# Patient Record
Sex: FEMALE | Race: WHITE | Employment: OTHER | URBAN - METROPOLITAN AREA
[De-identification: names, ages, dates, MRNs, and addresses within clinical notes are randomized per-mention and may not be internally consistent; named-entity substitution may affect disease eponyms.]

---

## 2017-01-06 ENCOUNTER — HOSPITAL ENCOUNTER (OUTPATIENT)
Dept: CT IMAGING | Age: 82
Discharge: HOME | End: 2017-01-06
Attending: INTERNAL MEDICINE
Payer: MEDICARE

## 2017-01-06 DIAGNOSIS — R22.1 NECK MASS: ICD-10-CM

## 2017-01-06 LAB — CREAT BLD-MCNC: 0.6 MG/DL (ref 0.6–1.3)

## 2017-01-06 PROCEDURE — 74011250636 HC RX REV CODE- 250/636: Performed by: INTERNAL MEDICINE

## 2017-01-06 PROCEDURE — 70491 CT SOFT TISSUE NECK W/DYE: CPT

## 2017-01-06 PROCEDURE — 74011636320 HC RX REV CODE- 636/320: Performed by: INTERNAL MEDICINE

## 2017-01-06 PROCEDURE — 82565 ASSAY OF CREATININE: CPT

## 2017-01-06 RX ORDER — SODIUM CHLORIDE 0.9 % (FLUSH) 0.9 %
10 SYRINGE (ML) INJECTION
Status: COMPLETED | OUTPATIENT
Start: 2017-01-06 | End: 2017-01-06

## 2017-01-06 RX ORDER — SODIUM CHLORIDE 9 MG/ML
50 INJECTION, SOLUTION INTRAVENOUS
Status: COMPLETED | OUTPATIENT
Start: 2017-01-06 | End: 2017-01-06

## 2017-01-06 RX ADMIN — SODIUM CHLORIDE 50 ML/HR: 900 INJECTION, SOLUTION INTRAVENOUS at 12:59

## 2017-01-06 RX ADMIN — Medication 10 ML: at 12:59

## 2017-01-06 RX ADMIN — IOPAMIDOL 100 ML: 612 INJECTION, SOLUTION INTRAVENOUS at 12:59

## 2017-01-13 ENCOUNTER — HOSPITAL ENCOUNTER (OUTPATIENT)
Dept: CT IMAGING | Age: 82
Discharge: HOME | End: 2017-01-13
Attending: INTERNAL MEDICINE
Payer: MEDICARE

## 2017-01-13 DIAGNOSIS — J18.9 PNEUMONIA: ICD-10-CM

## 2017-01-13 PROCEDURE — 74011636320 HC RX REV CODE- 636/320: Performed by: INTERNAL MEDICINE

## 2017-01-13 PROCEDURE — 74011250636 HC RX REV CODE- 250/636: Performed by: INTERNAL MEDICINE

## 2017-01-13 PROCEDURE — 71260 CT THORAX DX C+: CPT

## 2017-01-13 RX ORDER — SODIUM CHLORIDE 9 MG/ML
50 INJECTION, SOLUTION INTRAVENOUS
Status: COMPLETED | OUTPATIENT
Start: 2017-01-13 | End: 2017-01-13

## 2017-01-13 RX ORDER — SODIUM CHLORIDE 0.9 % (FLUSH) 0.9 %
10 SYRINGE (ML) INJECTION
Status: COMPLETED | OUTPATIENT
Start: 2017-01-13 | End: 2017-01-13

## 2017-01-13 RX ADMIN — Medication 10 ML: at 07:07

## 2017-01-13 RX ADMIN — IOPAMIDOL 100 ML: 612 INJECTION, SOLUTION INTRAVENOUS at 07:07

## 2017-01-13 RX ADMIN — SODIUM CHLORIDE 50 ML/HR: 900 INJECTION, SOLUTION INTRAVENOUS at 07:07

## 2017-01-20 ENCOUNTER — OFFICE VISIT (OUTPATIENT)
Dept: SURGERY | Age: 82
End: 2017-01-20

## 2017-01-20 VITALS
HEART RATE: 69 BPM | WEIGHT: 139 LBS | OXYGEN SATURATION: 96 % | RESPIRATION RATE: 16 BRPM | SYSTOLIC BLOOD PRESSURE: 131 MMHG | DIASTOLIC BLOOD PRESSURE: 76 MMHG | BODY MASS INDEX: 23.16 KG/M2 | HEIGHT: 65 IN

## 2017-01-20 DIAGNOSIS — E04.9 GOITER, NON-TOXIC: Primary | ICD-10-CM

## 2017-01-20 NOTE — PATIENT INSTRUCTIONS
Surgery Instruction Sheet    You have been scheduled for surgery on 2/2/17 at 2:00 pm at [address] [place] [address]. Please report to the Surgery Center at 12:00 pm, this is approximately 2 hours prior to your surgery time. The Surgery Center is located on the [address] side of the hospital, just next to the Emergency Room. Reserved parking is available and  parking if lot is full. You will need to have a Pre-op Visit prior to your surgery. Report to the Surgery Center on 1/25/17 at 8:00 am.  Bring a list of medications and your insurance cards with you. You may eat/drink prior to this visit. Call your physician immediately if you notice a change in your health between the time you saw your physician and the day of surgery. If you take a blood thinner, please let us know. Call your ordering Doctor to make sure you can stop taking it prior to your surgery. STOP YOUR ASPIRIN 10 DAYS PRIOR TO SURGERY. DO NOT TAKE  IBUPROFEN, ADVIL, MOTRIN, ALEVE, EXCEDRIN, BC POWDER, GOODIES, FISH OIL OR ANY MEDICATION CONTAINING ASPIRIN 10 DAYS PRIOR TO YOUR SURGERY. MAY TAKE TYLENOL. Eat a light dinner the evening before your surgery. DO NOT EAT OR DRINK ANYTHING AFTER MIDNIGHT THE NIGHT BEFORE YOUR SURGERY. This includes water, chewing gum, lifesavers, etc.  The Pre op nurse will check with you about any medication that you may need to take the morning of surgery. Shower with a new bar of anti-bacterial soap (Dial, Safeguard) or solution given to you by Pre-op, the night before surgery. Do not use lotion, powder, deodorant on the skin after showering. Wear loose, comfortable clothing the day of surgery and bring a container to store your contacts, eyeglasses, dentures, hearing aid, etc.  Do not bring money, valuables, jewelry, etc. to the hospital.      If you are having outpatient surgery, someone must come with you the morning of surgery to drive you home.   You can not drive for 24 hours after any anesthesia. Sometimes it is necessary to stay overnight and leave the next morning. This is still considered outpatient for most insurance deductibles. Someone will still need to drive you home. If you have questions or concerns, please feel free to call Dr Crystal Ivey. LPN at [phone]. If you need to cancel your surgery, please call as soon as possible.

## 2017-01-20 NOTE — PROGRESS NOTES
HISTORY OF PRESENT ILLNESS  Tracey Kathleen is a 80 y.o. female who comes in for consultation by Rogene Boast, MD for a symptomatic goiter  HPI  She recently had a URI and was felt to have enlargement of her left thyroid gland. She has a known goiter and had previously had the right lobe removed. She had a neck CT suggesting a 7.2 x 4.2 cm left lobe which extended a little below the sternal notch. This had slightly increased in size since it was last checked. She is on synthroid for hypothyroidism. She reports some dysphagia and neck pressure when laying flat. She denies voice changes, palpitations, chest pain, SOB, DODGE. She goes to the gym regularly during the week. Past Medical History   Diagnosis Date    Cancer [facility])      colon    Hypertension     Other ill-defined conditions(799.89)      hypercholesterolemia    Thyroid disease      Past Surgical History   Procedure Laterality Date    Pr abdomen surgery proc unlisted       colon resection    Hx heent       partial thyroidectomy    Hx tonsillectomy      Pr colonoscopy flx dx w/collj spec when pfrmd  3/13/2013          Hx other surgical       skin cancer removed     Family History   Problem Relation Age of Onset    Cancer Mother      stomach    Dementia Mother     Hypertension Mother     Hypertension Father     Cancer Sister      Colon Cancer    Heart Disease Brother     Diabetes Brother     Cancer Brother      Colon Cancer    Heart Disease Son      Social History   Substance Use Topics    Smoking status: Never Smoker    Smokeless tobacco: None    Alcohol use No     Current Outpatient Prescriptions   Medication Sig    OTHER Take 2.5 mg by mouth. Take Besylate 2.5mg    CHOLECALCIFEROL, VITAMIN D3, (VITAMIN D3 PO) Take  by mouth.  levothyroxine (SYNTHROID) 50 mcg tablet Take 50 mcg by mouth Daily (before breakfast).  aspirin delayed-release 81 mg tablet Take 81 mg by mouth daily.     cyanocobalamin (VITAMIN B-12) 500 mcg tablet Take 1,000 mcg by mouth daily.  vitamin a-vitamin c-vit e-min (OCUVITE) tablet Take 1 Tab by mouth daily.  atorvastatin (LIPITOR) 10 mg tablet Take 10 mg by mouth every evening.  donepezil (ARICEPT) 10 mg tablet Take 10 mg by mouth nightly. No current facility-administered medications for this visit. No Known Allergies    Review of Systems   Constitutional: Negative for chills, diaphoresis, fever, malaise/fatigue and weight loss. HENT: Positive for congestion and hearing loss. Negative for ear pain and sore throat. Eyes: Negative for blurred vision and pain. Respiratory: Negative for cough, hemoptysis, sputum production, shortness of breath, wheezing and stridor. Cardiovascular: Negative for chest pain, palpitations, orthopnea, claudication, leg swelling and PND. Gastrointestinal: Negative for blood in stool, constipation, diarrhea, heartburn, melena, nausea and vomiting. Genitourinary: Negative for dysuria, flank pain, frequency, hematuria and urgency. Musculoskeletal: Negative for back pain, joint pain, myalgias and neck pain. Skin: Negative for itching and rash. Neurological: Negative for dizziness, tremors, focal weakness, seizures, weakness and headaches. Endo/Heme/Allergies: Negative for polydipsia. Psychiatric/Behavioral: Negative for depression and memory loss. The patient is not nervous/anxious. Visit Vitals    /76 (BP 1 Location: Right arm, BP Patient Position: Sitting)    Pulse 69    Resp 16    Ht 5' 5\" (1.651 m)    Wt 63 kg (139 lb)    SpO2 96%    BMI 23.13 kg/m2       Physical Exam   Constitutional: She is oriented to person, place, and time. She appears well-developed and well-nourished. No distress. HENT:   Head: Normocephalic and atraumatic. Mouth/Throat: Oropharynx is clear and moist. No oropharyngeal exudate. Eyes: Conjunctivae and EOM are normal. Pupils are equal, round, and reactive to light. No scleral icterus.    Neck: Normal range of motion, full passive range of motion without pain and phonation normal. Neck supple. No JVD present. No tracheal deviation present. Thyroid mass (7-8 cm left lobe) present. No thyromegaly present. Cardiovascular: Normal rate and regular rhythm. Exam reveals no gallop and no friction rub. No murmur heard. Pulmonary/Chest: Effort normal and breath sounds normal. No stridor. No respiratory distress. She has no wheezes. She has no rales. Abdominal: Soft. Bowel sounds are normal. She exhibits no distension and no mass. There is no tenderness. There is no rebound and no guarding. Musculoskeletal: Normal range of motion. She exhibits no edema. Lymphadenopathy:     She has no cervical adenopathy. Neurological: She is alert and oriented to person, place, and time. No cranial nerve deficit. Skin: Skin is warm and dry. No rash noted. She is not diaphoretic. No erythema. No pallor. Psychiatric: She has a normal mood and affect. Her behavior is normal. Judgment and thought content normal.       ASSESSMENT and PLAN  1. Symptomatic goiter, left dominant nodule with hx prior right lobectomy I explained about the anatomy and pathophysiology of thyroid nodules/disease. I explained about possible malignancy. I discussed FNA, observation, possible thyroid suppression pending FNA, and total thyroidectomy. Risks of surgery include bleeding (potentially requiring emergent exploration at bedside), infection, parathyroid injury/removal requiring supplemental calcium +/- vit d, recurrent laryngeal/superior laryngeal nerve injury resulting in vocal cord paralysis, voice changes and fatigue, aspiration, further surgery, need for lifelong thyroid supplementation. We discussed FNA to assess for possible malignancy but she declined.   She desires a redo neck exploration and completion thyroidectomy under general anesthesia as an outpatient with an overnight stay  Her daughter was with her in the office today    Geovani Dye MD FACS

## 2017-01-20 NOTE — MR AVS SNAPSHOT
Visit Information Date & Time Provider Department Dept. Phone Encounter #  
 1/20/2017 11:20 AM Andres Tirado MD [facility] [address] [phone] [id number] Upcoming Health Maintenance Date Due DTaP/Tdap/Td series (1 - Tdap) 6/9/1950 ZOSTER VACCINE AGE 60> 6/9/1989 GLAUCOMA SCREENING Q2Y 6/9/1994 OSTEOPOROSIS SCREENING (DEXA) 6/9/1994 Pneumococcal 65+ Low/Medium Risk (1 of 2 - PCV13) 6/9/1994 MEDICARE YEARLY EXAM 6/9/1994 INFLUENZA AGE 9 TO ADULT 8/1/2016 Allergies as of 1/20/2017  Review Complete On: 1/20/2017 By: Andres Tirado MD  
 No Known Allergies Current Immunizations  Never Reviewed No immunizations on file. Not reviewed this visit Vitals BP Pulse Resp Height(growth percentile) Weight(growth percentile) SpO2  
 131/76 (BP 1 Location: Right arm, BP Patient Position: Sitting) 69 16 5' 5\" (1.651 m) 139 lb (63 kg) 96% BMI OB Status Smoking Status 23.13 kg/m2 Postmenopausal Never Smoker BMI and BSA Data Body Mass Index Body Surface Area  
 23.13 kg/m 2 1.7 m 2 Preferred Pharmacy Pharmacy Name Phone [facility] [address], [address] [place] [address] Avenue [phone] Your Updated Medication List  
  
   
This list is accurate as of: 1/20/17 12:17 PM.  Always use your most recent med list.  
  
  
  
  
 ARICEPT 10 mg tablet Generic drug:  donepezil Take 10 mg by mouth nightly. aspirin delayed-release 81 mg tablet Take 81 mg by mouth daily. LIPITOR 10 mg tablet Generic drug:  atorvastatin Take 10 mg by mouth every evening. OCUVITE tablet Generic drug:  vitamin a-vitamin c-vit e-min Take 1 Tab by mouth daily. OTHER Take 2.5 mg by mouth. Take Besylate 2.5mg  
  
 synthroid 50 mcg tablet Generic drug:  levothyroxine Take 50 mcg by mouth Daily (before breakfast). VITAMIN B-12 500 mcg tablet Generic drug:  cyanocobalamin Take 1,000 mcg by mouth daily. VITAMIN D3 PO Take  by mouth. To-Do List   
 01/26/2017 8:00 AM  
  Appointment with [facility] PAT ROOM P1 at [address] [place] [address] ([phone]) Patient Instructions Surgery Instruction Sheet You have been scheduled for surgery on 2/2/17 at 2:00 pm at [address] [facility]. Please report to the Surgery Center at 12:00 pm, this is approximately 2 hours prior to your surgery time. The Surgery Center is located on the [address] [place] [address] Street side of the hospital, just next to the Emergency Room. Reserved parking is available and  parking if lot is full. You will need to have a Pre-op Visit prior to your surgery. Report to the Surgery Center on 1/25/17 at 8:00 am.  Bring a list of medications and your insurance cards with you. You may eat/drink prior to this visit. Call your physician immediately if you notice a change in your health between the time you saw your physician and the day of surgery. If you take a blood thinner, please let us know. Call your ordering Doctor to make sure you can stop taking it prior to your surgery. STOP YOUR ASPIRIN 10 DAYS PRIOR TO SURGERY. DO NOT TAKE  IBUPROFEN, ADVIL, MOTRIN, ALEVE, EXCEDRIN, BC POWDER, GOODIES, FISH OIL OR ANY MEDICATION CONTAINING ASPIRIN 10 DAYS PRIOR TO YOUR SURGERY. MAY TAKE TYLENOL. Eat a light dinner the evening before your surgery. DO NOT EAT OR DRINK ANYTHING AFTER MIDNIGHT THE NIGHT BEFORE YOUR SURGERY. This includes water, chewing gum, lifesavers, etc.  The Pre op nurse will check with you about any medication that you may need to take the morning of surgery. Shower with a new bar of anti-bacterial soap (Dial, Safeguard) or solution given to you by Pre-op, the night before surgery. Do not use lotion, powder, deodorant on the skin after showering.   Wear loose, comfortable clothing the day of surgery and bring a container to store your contacts, eyeglasses, dentures, hearing aid, etc.  Do not bring money, valuables, jewelry, etc. to the hospital.   
 
If you are having outpatient surgery, someone must come with you the morning of surgery to drive you home. You can not drive for 24 hours after any anesthesia. Sometimes it is necessary to stay overnight and leave the next morning. This is still considered outpatient for most insurance deductibles. Someone will still need to drive you home. If you have questions or concerns, please feel free to call Dr Dunia Tovar. LPN at [phone]. If you need to cancel your surgery, please call as soon as possible. Introducing [facility]! [address] introduces [employer] patient portal. Now you can access parts of your medical record, email your doctor's office, and request medication refills online. 1. In your internet browser, go to [email or web address] 2. Click on the First Time User? Click Here link in the Sign In box. You will see the New Member Sign Up page. 3. Enter your [employer] Access Code exactly as it appears below. You will not need to use this code after youve completed the sign-up process. If you do not sign up before the expiration date, you must request a new code. · [employer] Access Code: [email or web address] Expires: 4/5/2017  4:53 PM 
 
4. Enter the last four digits of your Social Security Number (xxxx) and Date of Birth (mm/dd/yyyy) as indicated and click Submit. You will be taken to the next sign-up page. 5. Create a [employer]t ID. This will be your my[employer]Dreams login ID and cannot be changed, so think of one that is secure and easy to remember. 6. Create a [employer]reams password. You can change your password at any time. 7. Enter your Password Reset Question and Answer. This can be used at a later time if you forget your password. 8. Enter your e-mail address. You will receive e-mail notification when new information is available in [address]. 9. Click Sign Up. You can now view and download portions of your medical record. 10. Click the Download Summary menu link to download a portable copy of your medical information. If you have questions, please visit the Frequently Asked Questions section of the [employer] website. Remember, [employer] is NOT to be used for urgent needs. For medical emergencies, dial 911. Now available from your iPhone and Android! Please provide this summary of care documentation to your next provider. Your primary care clinician is listed as [address] [place] [address]. If you have any questions after today's visit, please call [phone].

## 2017-01-24 ENCOUNTER — TELEPHONE (OUTPATIENT)
Dept: SURGERY | Age: 82
End: 2017-01-24

## 2017-01-24 NOTE — TELEPHONE ENCOUNTER
Patient calling to ask if her surgery time on 2/2/17 could be moved up? Patient can be reached on home phone.

## 2017-01-24 NOTE — TELEPHONE ENCOUNTER
Spoke to pt, at this time there are no cancellations. Will call back if an earlier time is available.

## 2017-01-26 ENCOUNTER — HOSPITAL ENCOUNTER (OUTPATIENT)
Dept: GENERAL RADIOLOGY | Age: 82
Discharge: HOME | End: 2017-01-26
Attending: SURGERY
Payer: MEDICARE

## 2017-01-26 ENCOUNTER — HOSPITAL ENCOUNTER (OUTPATIENT)
Dept: PREADMISSION TESTING | Age: 82
Discharge: HOME | End: 2017-01-26
Attending: SURGERY
Payer: MEDICARE

## 2017-01-26 VITALS
BODY MASS INDEX: 23.18 KG/M2 | WEIGHT: 139.11 LBS | SYSTOLIC BLOOD PRESSURE: 141 MMHG | HEART RATE: 75 BPM | OXYGEN SATURATION: 98 % | RESPIRATION RATE: 16 BRPM | DIASTOLIC BLOOD PRESSURE: 66 MMHG | HEIGHT: 65 IN | TEMPERATURE: 97.7 F

## 2017-01-26 LAB
ANION GAP BLD CALC-SCNC: 9 MMOL/L (ref 5–15)
ATRIAL RATE: 74 BPM
BUN SERPL-MCNC: 14 MG/DL (ref 6–20)
BUN/CREAT SERPL: 24 (ref 12–20)
CALCIUM SERPL-MCNC: 8.6 MG/DL (ref 8.5–10.1)
CALCULATED P AXIS, ECG09: 60 DEGREES
CALCULATED R AXIS, ECG10: 48 DEGREES
CALCULATED T AXIS, ECG11: 56 DEGREES
CHLORIDE SERPL-SCNC: 108 MMOL/L (ref 97–108)
CO2 SERPL-SCNC: 26 MMOL/L (ref 21–32)
CREAT SERPL-MCNC: 0.58 MG/DL (ref 0.55–1.02)
DIAGNOSIS, 93000: NORMAL
ERYTHROCYTE [DISTWIDTH] IN BLOOD BY AUTOMATED COUNT: 13.6 % (ref 11.5–14.5)
GLUCOSE SERPL-MCNC: 101 MG/DL (ref 65–100)
HCT VFR BLD AUTO: 39.9 % (ref 35–47)
HGB BLD-MCNC: 13.1 G/DL (ref 11.5–16)
MCH RBC QN AUTO: 29.2 PG (ref 26–34)
MCHC RBC AUTO-ENTMCNC: 32.8 G/DL (ref 30–36.5)
MCV RBC AUTO: 88.9 FL (ref 80–99)
P-R INTERVAL, ECG05: 168 MS
PLATELET # BLD AUTO: 177 K/UL (ref 150–400)
POTASSIUM SERPL-SCNC: 4.1 MMOL/L (ref 3.5–5.1)
Q-T INTERVAL, ECG07: 386 MS
QRS DURATION, ECG06: 76 MS
QTC CALCULATION (BEZET), ECG08: 428 MS
RBC # BLD AUTO: 4.49 M/UL (ref 3.8–5.2)
SODIUM SERPL-SCNC: 143 MMOL/L (ref 136–145)
VENTRICULAR RATE, ECG03: 74 BPM
WBC # BLD AUTO: 5.3 K/UL (ref 3.6–11)

## 2017-01-26 PROCEDURE — 93005 ELECTROCARDIOGRAM TRACING: CPT

## 2017-01-26 PROCEDURE — 85027 COMPLETE CBC AUTOMATED: CPT | Performed by: SURGERY

## 2017-01-26 PROCEDURE — 80048 BASIC METABOLIC PNL TOTAL CA: CPT | Performed by: SURGERY

## 2017-01-26 PROCEDURE — 71020 XR CHEST PA LAT: CPT

## 2017-01-26 PROCEDURE — 36415 COLL VENOUS BLD VENIPUNCTURE: CPT | Performed by: SURGERY

## 2017-01-26 RX ORDER — CEFAZOLIN SODIUM IN 0.9 % NACL 2 G/100 ML
2 PLASTIC BAG, INJECTION (ML) INTRAVENOUS ONCE
Status: CANCELLED | OUTPATIENT
Start: 2017-02-02 | End: 2017-02-02

## 2017-01-26 NOTE — PERIOP NOTES
[facility]  Preoperative Instructions        Surgery Date 2/2/2017          Time of Arrival 12:00AM    1. On the day of your surgery, please report to the Surgical Services Registration Desk and sign in at your designated time. The Surgery Center is located to the right of the Emergency Room. 2. You must have someone with you to drive you home. You should not drive a car for 24 hours following surgery. Please make arrangements for a friend or family member to stay with you for the first 24 hours after your surgery. 3. Do not have anything to eat or drink (including water, gum, mints, coffee, juice) after midnight 2/1/2017              . This may not apply to medications prescribed by your physician. Please note special instructions, if applicable. If you are currently taking Plavix, Coumadin, or other blood-thinning agents, contact your surgeon for instructions. 4. We recommend you do not drink any alcoholic beverages for 24 hours before and after your surgery. 5. Have a list of all current medications, including vitamins, herbal supplements and any other over the counter medications. Stop all Aspirin and non-steroidal anti-inflammatory drugs (I.e. Advil, Aleve), as directed by your surgeon's office. Stop all vitamins and herbal supplements seven days prior to your surgery. 6. Wear comfortable clothes. Wear glasses instead of contacts. Do not bring any money or jewelry. Please bring picture ID, insurance card, and any prearranged co-payment or hospital payment. Do not wear make-up, particularly mascara the morning of your surgery. Do not wear nail polish, particularly if you are having foot /hand surgery. Wear your hair loose or down, no ponytails, buns, clare pins or clips. All body piercings must be removed.   Please shower with antibacterial soap for three consecutive days before and on the morning of surgery, but do not apply any lotions, powders or deodorants after the shower on the day of surgery. Please use a fresh towels after each shower. Please sleep in clean clothes and change bed linens the night before surgery. Please do not shave for 48 hours prior to surgery. Shaving of the face is acceptable. 7. You should understand that if you do not follow these instructions your surgery may be cancelled. If your physical condition changes (I.e. fever, cold or flu) please contact your surgeon as soon as possible. 8. It is important that you be on time. If a situation occurs where you may be late, please call [phone] (OR Holding Area). 9. If you have any questions and or problems, please call [phone] (Pre-admission Testing). 10. Your surgery time may be subject to change. You will receive a phone call the evening prior if your time changes. 11.  If having outpatient surgery, you must have someone to drive you here, stay with you during the duration of your stay, and to drive you home at time of discharge. Special Instructions:Stop Aspirin per surgeon's instructions. Stop Vitamins and Vitamin supplements 7 days prior to surgery    MEDICATIONS TO TAKE THE MORNING OF SURGERY WITH A SIP OF WATER:Synthroid, Amlodipine Besylate      I understand a pre-operative phone call will be made to verify my surgery time. In the event that I am not available, I give permission for a message to be left on my answering service and/or with another person?  Yes [phone]         ___________________      __________   _________    (Signature of Patient)             (Witness)                (Date and Time)

## 2017-01-30 NOTE — PERIOP NOTES
Called Dr Heather Sena office and spoke with Jacob Predomo to FU on previously Faxed pre-op labs and chest xray results, she stated \" Dr Carmela Galdamez evaluated and  said they were OK for surgery,  No Tx\".  Chriss DEGROOT

## 2017-02-01 ENCOUNTER — TELEPHONE (OUTPATIENT)
Dept: SURGERY | Age: 82
End: 2017-02-01

## 2017-02-01 NOTE — TELEPHONE ENCOUNTER
Spoke to pt, advised that she should resume normal diet after 24 hours and no lifting more than 10 lbs for one week. Patient verbalized understanding, no further questions.

## 2017-02-02 ENCOUNTER — ANESTHESIA (OUTPATIENT)
Dept: SURGERY | Age: 82
End: 2017-02-02
Payer: MEDICARE

## 2017-02-02 ENCOUNTER — ANESTHESIA EVENT (OUTPATIENT)
Dept: SURGERY | Age: 82
End: 2017-02-02
Payer: MEDICARE

## 2017-02-02 ENCOUNTER — SURGERY (OUTPATIENT)
Age: 82
End: 2017-02-02

## 2017-02-02 ENCOUNTER — HOSPITAL ENCOUNTER (OUTPATIENT)
Age: 82
Discharge: HOME | End: 2017-02-03
Attending: SURGERY | Admitting: SURGERY
Payer: MEDICARE

## 2017-02-02 LAB — CALCIUM SERPL-MCNC: 8.7 MG/DL (ref 8.5–10.1)

## 2017-02-02 PROCEDURE — 74011250636 HC RX REV CODE- 250/636

## 2017-02-02 PROCEDURE — 77030027138 HC INCENT SPIROMETER -A

## 2017-02-02 PROCEDURE — 77030029131 HC ADMN ST IV BLD N DEHP ICUM -B

## 2017-02-02 PROCEDURE — 77030010938 HC CLP LIG TELE -A: Performed by: SURGERY

## 2017-02-02 PROCEDURE — 76060000033 HC ANESTHESIA 1 TO 1.5 HR: Performed by: SURGERY

## 2017-02-02 PROCEDURE — 77030010375

## 2017-02-02 PROCEDURE — 77030018390 HC SPNG HEMSTAT2 J&J -B: Performed by: SURGERY

## 2017-02-02 PROCEDURE — 76010000149 HC OR TIME 1 TO 1.5 HR: Performed by: SURGERY

## 2017-02-02 PROCEDURE — 74011250636 HC RX REV CODE- 250/636: Performed by: SURGERY

## 2017-02-02 PROCEDURE — 88307 TISSUE EXAM BY PATHOLOGIST: CPT | Performed by: SURGERY

## 2017-02-02 PROCEDURE — 77030011640 HC PAD GRND REM COVD -A: Performed by: SURGERY

## 2017-02-02 PROCEDURE — 77030011267 HC ELECTRD BLD COVD -A: Performed by: SURGERY

## 2017-02-02 PROCEDURE — 77030018836 HC SOL IRR NACL ICUM -A: Performed by: SURGERY

## 2017-02-02 PROCEDURE — 82310 ASSAY OF CALCIUM: CPT | Performed by: SURGERY

## 2017-02-02 PROCEDURE — 74011000250 HC RX REV CODE- 250

## 2017-02-02 PROCEDURE — 77030002996 HC SUT SLK J&J -A: Performed by: SURGERY

## 2017-02-02 PROCEDURE — 77030032490 HC SLV COMPR SCD KNE COVD -B: Performed by: SURGERY

## 2017-02-02 PROCEDURE — 36415 COLL VENOUS BLD VENIPUNCTURE: CPT | Performed by: SURGERY

## 2017-02-02 PROCEDURE — 77030008684 HC TU ET CUF COVD -B: Performed by: NURSE ANESTHETIST, CERTIFIED REGISTERED

## 2017-02-02 PROCEDURE — 77030026438 HC STYL ET INTUB CARD -A: Performed by: NURSE ANESTHETIST, CERTIFIED REGISTERED

## 2017-02-02 PROCEDURE — 77030010516 HC APPL HEMA CLP TELE -B: Performed by: SURGERY

## 2017-02-02 PROCEDURE — 76210000016 HC OR PH I REC 1 TO 1.5 HR: Performed by: SURGERY

## 2017-02-02 PROCEDURE — 74011250636 HC RX REV CODE- 250/636: Performed by: ANESTHESIOLOGY

## 2017-02-02 PROCEDURE — 77030019908 HC STETH ESOPH SIMS -A: Performed by: NURSE ANESTHETIST, CERTIFIED REGISTERED

## 2017-02-02 PROCEDURE — 77030020782 HC GWN BAIR PAWS FLX 3M -B

## 2017-02-02 PROCEDURE — 77030031139 HC SUT VCRL2 J&J -A: Performed by: SURGERY

## 2017-02-02 PROCEDURE — 77030010507 HC ADH SKN DERMBND J&J -B: Performed by: SURGERY

## 2017-02-02 RX ORDER — FENTANYL CITRATE 50 UG/ML
50 INJECTION, SOLUTION INTRAMUSCULAR; INTRAVENOUS AS NEEDED
Status: DISCONTINUED | OUTPATIENT
Start: 2017-02-02 | End: 2017-02-02 | Stop reason: HOSPADM

## 2017-02-02 RX ORDER — LEVOTHYROXINE SODIUM 50 UG/1
50 TABLET ORAL
Status: DISCONTINUED | OUTPATIENT
Start: 2017-02-03 | End: 2017-02-03 | Stop reason: HOSPADM

## 2017-02-02 RX ORDER — ROCURONIUM BROMIDE 10 MG/ML
INJECTION, SOLUTION INTRAVENOUS AS NEEDED
Status: DISCONTINUED | OUTPATIENT
Start: 2017-02-02 | End: 2017-02-02 | Stop reason: HOSPADM

## 2017-02-02 RX ORDER — ACETAMINOPHEN 10 MG/ML
INJECTION, SOLUTION INTRAVENOUS AS NEEDED
Status: DISCONTINUED | OUTPATIENT
Start: 2017-02-02 | End: 2017-02-02 | Stop reason: HOSPADM

## 2017-02-02 RX ORDER — HYDROMORPHONE HYDROCHLORIDE 1 MG/ML
.2-.5 INJECTION, SOLUTION INTRAMUSCULAR; INTRAVENOUS; SUBCUTANEOUS
Status: DISCONTINUED | OUTPATIENT
Start: 2017-02-02 | End: 2017-02-02 | Stop reason: HOSPADM

## 2017-02-02 RX ORDER — SODIUM CHLORIDE, SODIUM LACTATE, POTASSIUM CHLORIDE, CALCIUM CHLORIDE 600; 310; 30; 20 MG/100ML; MG/100ML; MG/100ML; MG/100ML
25 INJECTION, SOLUTION INTRAVENOUS CONTINUOUS
Status: DISCONTINUED | OUTPATIENT
Start: 2017-02-02 | End: 2017-02-02 | Stop reason: HOSPADM

## 2017-02-02 RX ORDER — HYDROCODONE BITARTRATE AND ACETAMINOPHEN 5; 325 MG/1; MG/1
1-2 TABLET ORAL
Qty: 30 TAB | Refills: 0 | Status: SHIPPED | OUTPATIENT
Start: 2017-02-02 | End: 2017-03-13 | Stop reason: ALTCHOICE

## 2017-02-02 RX ORDER — ONDANSETRON 2 MG/ML
4 INJECTION INTRAMUSCULAR; INTRAVENOUS AS NEEDED
Status: DISCONTINUED | OUTPATIENT
Start: 2017-02-02 | End: 2017-02-02 | Stop reason: HOSPADM

## 2017-02-02 RX ORDER — CEFAZOLIN SODIUM IN 0.9 % NACL 2 G/100 ML
2 PLASTIC BAG, INJECTION (ML) INTRAVENOUS ONCE
Status: COMPLETED | OUTPATIENT
Start: 2017-02-02 | End: 2017-02-02

## 2017-02-02 RX ORDER — DIPHENHYDRAMINE HYDROCHLORIDE 50 MG/ML
12.5 INJECTION, SOLUTION INTRAMUSCULAR; INTRAVENOUS AS NEEDED
Status: DISCONTINUED | OUTPATIENT
Start: 2017-02-02 | End: 2017-02-02 | Stop reason: HOSPADM

## 2017-02-02 RX ORDER — FENTANYL CITRATE 50 UG/ML
25 INJECTION, SOLUTION INTRAMUSCULAR; INTRAVENOUS
Status: DISCONTINUED | OUTPATIENT
Start: 2017-02-02 | End: 2017-02-02 | Stop reason: HOSPADM

## 2017-02-02 RX ORDER — NEOSTIGMINE METHYLSULFATE 1 MG/ML
INJECTION INTRAVENOUS AS NEEDED
Status: DISCONTINUED | OUTPATIENT
Start: 2017-02-02 | End: 2017-02-02 | Stop reason: HOSPADM

## 2017-02-02 RX ORDER — LIDOCAINE HYDROCHLORIDE 20 MG/ML
INJECTION, SOLUTION EPIDURAL; INFILTRATION; INTRACAUDAL; PERINEURAL AS NEEDED
Status: DISCONTINUED | OUTPATIENT
Start: 2017-02-02 | End: 2017-02-02 | Stop reason: HOSPADM

## 2017-02-02 RX ORDER — FENTANYL CITRATE 50 UG/ML
INJECTION, SOLUTION INTRAMUSCULAR; INTRAVENOUS AS NEEDED
Status: DISCONTINUED | OUTPATIENT
Start: 2017-02-02 | End: 2017-02-02 | Stop reason: HOSPADM

## 2017-02-02 RX ORDER — SUCCINYLCHOLINE CHLORIDE 20 MG/ML
INJECTION INTRAMUSCULAR; INTRAVENOUS AS NEEDED
Status: DISCONTINUED | OUTPATIENT
Start: 2017-02-02 | End: 2017-02-02 | Stop reason: HOSPADM

## 2017-02-02 RX ORDER — HYDROCODONE BITARTRATE AND ACETAMINOPHEN 7.5; 325 MG/15ML; MG/15ML
10 SOLUTION ORAL
Status: DISCONTINUED | OUTPATIENT
Start: 2017-02-02 | End: 2017-02-03 | Stop reason: HOSPADM

## 2017-02-02 RX ORDER — ONDANSETRON 2 MG/ML
INJECTION INTRAMUSCULAR; INTRAVENOUS AS NEEDED
Status: DISCONTINUED | OUTPATIENT
Start: 2017-02-02 | End: 2017-02-02 | Stop reason: HOSPADM

## 2017-02-02 RX ORDER — SODIUM CHLORIDE 0.9 % (FLUSH) 0.9 %
5-10 SYRINGE (ML) INJECTION EVERY 8 HOURS
Status: DISCONTINUED | OUTPATIENT
Start: 2017-02-02 | End: 2017-02-03 | Stop reason: HOSPADM

## 2017-02-02 RX ORDER — MIDAZOLAM HYDROCHLORIDE 1 MG/ML
1 INJECTION, SOLUTION INTRAMUSCULAR; INTRAVENOUS AS NEEDED
Status: DISCONTINUED | OUTPATIENT
Start: 2017-02-02 | End: 2017-02-02 | Stop reason: HOSPADM

## 2017-02-02 RX ORDER — GLYCOPYRROLATE 0.2 MG/ML
INJECTION INTRAMUSCULAR; INTRAVENOUS AS NEEDED
Status: DISCONTINUED | OUTPATIENT
Start: 2017-02-02 | End: 2017-02-02 | Stop reason: HOSPADM

## 2017-02-02 RX ORDER — MORPHINE SULFATE 2 MG/ML
1-2 INJECTION, SOLUTION INTRAMUSCULAR; INTRAVENOUS
Status: DISCONTINUED | OUTPATIENT
Start: 2017-02-02 | End: 2017-02-03 | Stop reason: HOSPADM

## 2017-02-02 RX ORDER — SODIUM CHLORIDE 0.9 % (FLUSH) 0.9 %
5-10 SYRINGE (ML) INJECTION AS NEEDED
Status: DISCONTINUED | OUTPATIENT
Start: 2017-02-02 | End: 2017-02-03 | Stop reason: HOSPADM

## 2017-02-02 RX ORDER — ONDANSETRON 4 MG/1
4 TABLET, ORALLY DISINTEGRATING ORAL
Status: DISCONTINUED | OUTPATIENT
Start: 2017-02-02 | End: 2017-02-03 | Stop reason: HOSPADM

## 2017-02-02 RX ORDER — DIPHENHYDRAMINE HYDROCHLORIDE 50 MG/ML
12.5 INJECTION, SOLUTION INTRAMUSCULAR; INTRAVENOUS
Status: DISCONTINUED | OUTPATIENT
Start: 2017-02-02 | End: 2017-02-03 | Stop reason: HOSPADM

## 2017-02-02 RX ORDER — NALOXONE HYDROCHLORIDE 0.4 MG/ML
0.2 INJECTION, SOLUTION INTRAMUSCULAR; INTRAVENOUS; SUBCUTANEOUS
Status: DISCONTINUED | OUTPATIENT
Start: 2017-02-02 | End: 2017-02-03 | Stop reason: HOSPADM

## 2017-02-02 RX ORDER — LIDOCAINE HYDROCHLORIDE 10 MG/ML
0.1 INJECTION, SOLUTION EPIDURAL; INFILTRATION; INTRACAUDAL; PERINEURAL AS NEEDED
Status: DISCONTINUED | OUTPATIENT
Start: 2017-02-02 | End: 2017-02-02 | Stop reason: HOSPADM

## 2017-02-02 RX ORDER — DEXTROSE, SODIUM CHLORIDE, AND POTASSIUM CHLORIDE 5; .45; .15 G/100ML; G/100ML; G/100ML
50 INJECTION INTRAVENOUS CONTINUOUS
Status: DISCONTINUED | OUTPATIENT
Start: 2017-02-02 | End: 2017-02-03 | Stop reason: HOSPADM

## 2017-02-02 RX ORDER — PROPOFOL 10 MG/ML
INJECTION, EMULSION INTRAVENOUS AS NEEDED
Status: DISCONTINUED | OUTPATIENT
Start: 2017-02-02 | End: 2017-02-02 | Stop reason: HOSPADM

## 2017-02-02 RX ORDER — HYDROMORPHONE HYDROCHLORIDE 2 MG/ML
INJECTION, SOLUTION INTRAMUSCULAR; INTRAVENOUS; SUBCUTANEOUS AS NEEDED
Status: DISCONTINUED | OUTPATIENT
Start: 2017-02-02 | End: 2017-02-02 | Stop reason: HOSPADM

## 2017-02-02 RX ORDER — DEXAMETHASONE SODIUM PHOSPHATE 4 MG/ML
INJECTION, SOLUTION INTRA-ARTICULAR; INTRALESIONAL; INTRAMUSCULAR; INTRAVENOUS; SOFT TISSUE AS NEEDED
Status: DISCONTINUED | OUTPATIENT
Start: 2017-02-02 | End: 2017-02-02 | Stop reason: HOSPADM

## 2017-02-02 RX ADMIN — ACETAMINOPHEN 1000 MG: 10 INJECTION, SOLUTION INTRAVENOUS at 14:00

## 2017-02-02 RX ADMIN — CEFAZOLIN 2 G: 10 INJECTION, POWDER, FOR SOLUTION INTRAVENOUS; PARENTERAL at 13:38

## 2017-02-02 RX ADMIN — NEOSTIGMINE METHYLSULFATE 3 MG: 1 INJECTION INTRAVENOUS at 14:25

## 2017-02-02 RX ADMIN — PROPOFOL 150 MG: 10 INJECTION, EMULSION INTRAVENOUS at 13:27

## 2017-02-02 RX ADMIN — LIDOCAINE HYDROCHLORIDE 80 MG: 20 INJECTION, SOLUTION EPIDURAL; INFILTRATION; INTRACAUDAL; PERINEURAL at 13:27

## 2017-02-02 RX ADMIN — HYDROMORPHONE HYDROCHLORIDE 0.5 MG: 2 INJECTION, SOLUTION INTRAMUSCULAR; INTRAVENOUS; SUBCUTANEOUS at 13:55

## 2017-02-02 RX ADMIN — SODIUM CHLORIDE, SODIUM LACTATE, POTASSIUM CHLORIDE, AND CALCIUM CHLORIDE 25 ML/HR: 600; 310; 30; 20 INJECTION, SOLUTION INTRAVENOUS at 11:28

## 2017-02-02 RX ADMIN — ONDANSETRON 4 MG: 2 INJECTION INTRAMUSCULAR; INTRAVENOUS at 14:15

## 2017-02-02 RX ADMIN — FENTANYL CITRATE 50 MCG: 50 INJECTION, SOLUTION INTRAMUSCULAR; INTRAVENOUS at 13:45

## 2017-02-02 RX ADMIN — ROCURONIUM BROMIDE 30 MG: 10 INJECTION, SOLUTION INTRAVENOUS at 13:27

## 2017-02-02 RX ADMIN — GLYCOPYRROLATE 0.4 MG: 0.2 INJECTION INTRAMUSCULAR; INTRAVENOUS at 14:25

## 2017-02-02 RX ADMIN — SUCCINYLCHOLINE CHLORIDE 160 MG: 20 INJECTION INTRAMUSCULAR; INTRAVENOUS at 13:27

## 2017-02-02 RX ADMIN — DEXAMETHASONE SODIUM PHOSPHATE 5 MG: 4 INJECTION, SOLUTION INTRA-ARTICULAR; INTRALESIONAL; INTRAMUSCULAR; INTRAVENOUS; SOFT TISSUE at 13:47

## 2017-02-02 RX ADMIN — FENTANYL CITRATE 50 MCG: 50 INJECTION, SOLUTION INTRAMUSCULAR; INTRAVENOUS at 13:27

## 2017-02-02 RX ADMIN — DEXTROSE MONOHYDRATE, SODIUM CHLORIDE, AND POTASSIUM CHLORIDE 50 ML/HR: 50; 4.5; 1.49 INJECTION, SOLUTION INTRAVENOUS at 15:04

## 2017-02-02 NOTE — ANESTHESIA POSTPROCEDURE EVALUATION
Post-Anesthesia Evaluation and Assessment    Patient: Will Yousif MRN: [id number]  SSN: [id number]    YOB: 1929  Age: 80 y.o. Sex: female       Cardiovascular Function/Vital Signs  Visit Vitals    /72    Pulse (!) 56    Temp 36.2 °C (97.2 °F)    Resp 13    Ht 5' 5\" (1.651 m)    Wt 61 kg (134 lb 7.7 oz)    SpO2 98%    BMI 22.38 kg/m2       Patient is status post general anesthesia for Procedure(s):  COMPLETION THYROIDECTOMY. Nausea/Vomiting: None    Postoperative hydration reviewed and adequate. Pain:  Pain Scale 1: Numeric (0 - 10) (02/02/17 1450)  Pain Intensity 1: 0 (02/02/17 1450)   Managed    Neurological Status:   Neuro (WDL): Exceptions to WDL (02/02/17 1440)  Neuro  Neurologic State: Drowsy (02/02/17 1440)  Orientation Level: Oriented X4 (02/02/17 1440)  Cognition: Follows commands (02/02/17 1440)  Speech: Clear (02/02/17 1440)  LUE Motor Response: Purposeful (02/02/17 1440)  LLE Motor Response: Purposeful (02/02/17 1440)  RUE Motor Response: Purposeful (02/02/17 1440)  RLE Motor Response: Purposeful (02/02/17 1440)   At baseline    Mental Status and Level of Consciousness: Arousable    Pulmonary Status:   O2 Device: Nasal cannula (02/02/17 1450)   Adequate oxygenation and airway patent    Complications related to anesthesia: None    Post-anesthesia assessment completed.  No concerns    Signed By: Kenneth Maciel MD     February 2, 2017

## 2017-02-02 NOTE — IP AVS SNAPSHOT
Current Discharge Medication List  
  
Take these medications at their scheduled times Dose & Instructions Dispensing Information Comments Morning Noon Evening Bedtime  
 aspirin delayed-release 81 mg tablet Your next dose is: Today, Tomorrow Other:  ____________ Dose:  81 mg Take 81 mg by mouth daily. Refills:  0 PRESERVISION AREDS 2 (OMEGA-3) PO Your next dose is: Today, Tomorrow Other:  ____________ Dose:  1 Tab Take 1 Tab by mouth daily. Refills:  0  
     
   
   
   
  
 synthroid 50 mcg tablet Generic drug:  levothyroxine Your next dose is: Today, Tomorrow Other:  ____________ Dose:  50 mcg Take 50 mcg by mouth Daily (before breakfast). Refills:  0  
     
   
   
   
  
 VITAMIN B-12 500 mcg tablet Generic drug:  cyanocobalamin Your next dose is: Today, Tomorrow Other:  ____________ Dose:  1000 mcg Take 1,000 mcg by mouth daily. Refills:  0 Take these medications as needed Dose & Instructions Dispensing Information Comments Morning Noon Evening Bedtime HYDROcodone-acetaminophen 5-325 mg per tablet Commonly known as:  Thelbert Lockbourne Your next dose is: Today, Tomorrow Other:  ____________ Dose:  1-2 Tab Take 1-2 Tabs by mouth every six (6) hours as needed for Pain. Max Daily Amount: 8 Tabs. Quantity:  30 Tab Refills:  0 Take these medications as directed Dose & Instructions Dispensing Information Comments Morning Noon Evening Bedtime OTHER Your next dose is: Today, Tomorrow Other:  ____________ Dose:  2.5 mg Take 2.5 mg by mouth. Take Besylate 2.5mg Refills:  0  
     
   
   
   
  
 VITAMIN D3 PO Your next dose is: Today, Tomorrow Other:  ____________ Take  by mouth. Refills:  0 Where to Get Your Medications Information about where to get these medications is not yet available ! Ask your nurse or doctor about these medications HYDROcodone-acetaminophen 5-325 mg per tablet

## 2017-02-02 NOTE — BRIEF OP NOTE
BRIEF OPERATIVE NOTE    Date of Procedure: 2/2/2017   Preoperative Diagnosis: LEFT RECURRECT GOITER  Postoperative Diagnosis: GOITER    Procedure(s):  COMPLETION THYROIDECTOMY  Surgeon(s) and Role:     * Marah Guevara MD - Primary            Surgical Staff:  Circ-1: Mily Martins RN  Scrub Tech-1: Sharad Reinoso  Surg Asst-1: Willie Weston  Event Time In   Incision Start 1343   Incision Close 1425     Anesthesia: General   Estimated Blood Loss: 50 ml  Specimens:   ID Type Source Tests Collected by Time Destination   1 : left thyroid stitch left upper pole Preservative Thyroid  Marah Guevara MD 2/2/2017 1403 Pathology      Findings: large goiter   Complications: none  Implants: * No implants in log *

## 2017-02-02 NOTE — ROUTINE PROCESS
Primary Nurse Alfie Young RN and Janie Bourne RN performed a dual skin assessment on this patient No impairment noted  Ike score is 23. Small sore on top of left ankle.

## 2017-02-02 NOTE — DISCHARGE INSTRUCTIONS
Discharge Instructions:  Thyroidectomy    Dr. Rodrigues Neighbor    Call for appointment for follow up in 1 week [phone]    Activity:    Walk regularly. You may resume driving in 24 hours unless still requiring narcotics for pain. Work:    You may return to work in 11 - 7 days to light activity. No lifting more than 10 pounds for one week. Diet:    You may resume normal diet after 24 hours. Anesthesia and narcotics may cause nausea and vomiting. If persistent please call the office. Call if you have numbness or tingling around lips or fingertips as this is a sign of low calcium. Wound Care: You have a special dressing called Dermabond. It is okay to shower and let the water run over the incision but do not scrub the area or soak in a tub. If you have a small amount of drainage you may place a dry bandage over the wound and change it daily. If you experience a lot of drainage, develop redness around the wound, or a fever over 101 F occurs please call the office. Medications:    Resume home medications as indicated on the Medical Reconciliation form. Aspirin, Coumadin, and Plavix can be restarted on post operative day 2 if you were taking them preoperatively. Pain medications:  Non steroidal antiinflammatories seem to work best for post surgical pain. Try these first as prescribed. A narcotic prescription will also be given for breakthrough pain. Over the counter stool softeners and laxatives may be used if needed. Do not hesitate to call with questions or concerns.

## 2017-02-02 NOTE — IP AVS SNAPSHOT
[address] [facility] [phone]. [phone] Patient: Nathaly Persaud MRN: [id number] :1929 You are allergic to the following No active allergies Recent Documentation Height Weight BMI OB Status Smoking Status 1.651 m 61 kg 22.38 kg/m2 Postmenopausal Never Smoker Emergency Contacts Name Discharge Info Relation Home Work Mobile Destini Macdonald CAREGIVER [3] Daughter [21]   [phone] Vipul Fang  Son [22]   [phone] About your hospitalization You were admitted on:  2017 You last received care in the:  [facility] 2 GENERAL SURGERY You were discharged on:  February 3, 2017 Unit phone number:  [phone] Why you were hospitalized Your primary diagnosis was:  Not on File Providers Seen During Your Hospitalizations Provider Role Specialty Primary office phone Tess Salazar MD Attending Provider General Surgery [phone] Your Primary Care Physician (PCP) Primary Care Physician Office Phone Office Fax Patdietera Mems [phone] Follow-up Information Follow up With Details Comments Contact Info MD Jaime Sanchez [address] [facility] [phone] Your Appointments Friday February 10, 2017 10:20 AM EST  
POST OP with Tess Salazar MD  
[facility] [address] Omari [address] [place] [address], [address] [facility]  
[phone] Current Discharge Medication List  
  
START taking these medications Dose & Instructions Dispensing Information Comments Morning Noon Evening Bedtime HYDROcodone-acetaminophen 5-325 mg per tablet Commonly known as:  Annamary Birgit Your next dose is: Today, Tomorrow Other:  _________ Dose:  1-2 Tab Take 1-2 Tabs by mouth every six (6) hours as needed for Pain. Max Daily Amount: 8 Tabs. Quantity:  30 Tab Refills:  0 CONTINUE these medications which have NOT CHANGED Dose & Instructions Dispensing Information Comments Morning Noon Evening Bedtime  
 aspirin delayed-release 81 mg tablet Your next dose is: Today, Tomorrow Other:  _________ Dose:  81 mg Take 81 mg by mouth daily. Refills:  0  
     
   
   
   
  
 OTHER Your next dose is: Today, Tomorrow Other:  _________ Dose:  2.5 mg Take 2.5 mg by mouth. Take Besylate 2.5mg Refills:  0 PRESERVISION AREDS 2 (OMEGA-3) PO Your next dose is: Today, Tomorrow Other:  _________ Dose:  1 Tab Take 1 Tab by mouth daily. Refills:  0  
     
   
   
   
  
 synthroid 50 mcg tablet Generic drug:  levothyroxine Your next dose is: Today, Tomorrow Other:  _________ Dose:  50 mcg Take 50 mcg by mouth Daily (before breakfast). Refills:  0  
     
   
   
   
  
 VITAMIN B-12 500 mcg tablet Generic drug:  cyanocobalamin Your next dose is: Today, Tomorrow Other:  _________ Dose:  1000 mcg Take 1,000 mcg by mouth daily. Refills:  0  
     
   
   
   
  
 VITAMIN D3 PO Your next dose is: Today, Tomorrow Other:  _________ Take  by mouth. Refills:  0 Where to Get Your Medications Information on where to get these meds will be given to you by the nurse or doctor. ! Ask your nurse or doctor about these medications HYDROcodone-acetaminophen 5-325 mg per tablet Discharge Instructions Discharge Instructions:  Thyroidectomy Dr. Clydie Sexton Call for appointment for follow up in 1 week [phone] Activity: Walk regularly. You may resume driving in 24 hours unless still requiring narcotics for pain. Work: 
 
You may return to work in 11 - 7 days to light activity. No lifting more than 10 pounds for one week. Diet: 
 
You may resume normal diet after 24 hours. Anesthesia and narcotics may cause nausea and vomiting. If persistent please call the office. Call if you have numbness or tingling around lips or fingertips as this is a sign of low calcium. Wound Care: You have a special dressing called Dermabond. It is okay to shower and let the water run over the incision but do not scrub the area or soak in a tub. If you have a small amount of drainage you may place a dry bandage over the wound and change it daily. If you experience a lot of drainage, develop redness around the wound, or a fever over 101 F occurs please call the office. Medications: 
 
Resume home medications as indicated on the Medical Reconciliation form. Aspirin, Coumadin, and Plavix can be restarted on post operative day 2 if you were taking them preoperatively. Pain medications:  Non steroidal antiinflammatories seem to work best for post surgical pain. Try these first as prescribed. A narcotic prescription will also be given for breakthrough pain. Over the counter stool softeners and laxatives may be used if needed. Do not hesitate to call with questions or concerns. Discharge Orders None Introducing [facility] & HEALTH SERVICES! Jessica Stevens introduces Emair patient portal. Now you can access parts of your medical record, email your doctor's office, and request medication refills online. 1. In your internet browser, go to [email or web address]://[email or web address] 2. Click on the First Time User? Click Here link in the Sign In box. You will see the New Member Sign Up page. 3. Enter your Emair Access Code exactly as it appears below.  You will not need to use this code after youve completed the sign-up process. If you do not sign up before the expiration date, you must request a new code. · [employer] Access Code: [email or web address] Expires: 4/5/2017  4:53 PM 
 
4. Enter the last four digits of your Social Security Number (xxxx) and Date of Birth (mm/dd/yyyy) as indicated and click Submit. You will be taken to the next sign-up page. 5. Create a Canburg ID. This will be your Canburg login ID and cannot be changed, so think of one that is secure and easy to remember. 6. Create a [employer]burg password. You can change your password at any time. 7. Enter your Password Reset Question and Answer. This can be used at a later time if you forget your password. 8. Enter your e-mail address. You will receive e-mail notification when new information is available in [address] 9. Click Sign Up. You can now view and download portions of your medical record. 10. Click the Download Summary menu link to download a portable copy of your medical information. If you have questions, please visit the Frequently Asked Questions section of the Canburg website. Remember, Canburg is NOT to be used for urgent needs. For medical emergencies, dial 911. Now available from your iPhone and Android! General Information Please provide this summary of care documentation to your next provider. Patient Signature:  ____________________________________________________________ Date:  ____________________________________________________________  
  
Krystal Go Provider Signature:  ____________________________________________________________ Date:  ____________________________________________________________

## 2017-02-02 NOTE — PERIOP NOTES
1440-Handoff Report from Operating Room to PACU    Report received from [address] CRNA regarding Shabnam Ordonez. Surgeon(s):  Yung Marie MD  And Procedure(s) (LRB):  COMPLETION THYROIDECTOMY (Left)  confirmed   with allergies and dressings discussed. Anesthesia type, drugs, patient history, complications, estimated blood loss, vital signs, intake and output, and last pain medication, lines, reversal medications and temperature were reviewed. 1545- Family updated. TRANSFER - OUT REPORT:    Verbal report given to Ronit DEGROOT(name) on [facility]  being transferred to gen surg(unit) for routine post - op       Report consisted of patients Situation, Background, Assessment and   Recommendations(SBAR). Information from the following report(s) SBAR, Kardex, OR Summary, Procedure Summary, Intake/Output, MAR and Recent Results was reviewed with the receiving nurse. Opportunity for questions and clarification was provided.       Patient transported with:   O2 @ 2 liters  Tech

## 2017-02-02 NOTE — OP NOTES
[address], [address]   OP NOTE       Name:  Ramirez Vitale   MR#:  [id number]   :  1929   Account #:  [de-identified]    Surgery Date:  2017   Date of Adm:  2017       PREOPERATIVE DIAGNOSIS: Large left-sided goiter with substernal   component and prior right thyroid lobectomy. POSTOPERATIVE DIAGNOSIS: Large left-sided goiter with substernal   component and prior right thyroid lobectomy. PROCEDURES PERFORMED: Redo neck exploration with completion   thyroidectomy. SURGEON: Suraj Hooper. Venus Alcantar MD.    ANESTHESIA: General.    ESTIMATED BLOOD LOSS: 50 mL. COMPLICATIONS: None. SPECIMENS REMOVED: Left lobe thyroid, stitch at the upper pole. COMPLICATIONS: None. BRIEF HISTORY: The patient is an 80-year-old lady with symptomatic   goiter. She had previously had a right lobe removed and now an   enlarging left lobe that is becoming more and more symptomatic. Options were discussed. She elected to undergo neck exploration with   thyroidectomy. She understood that given the prior surgery, she was   higher risk for nerve injury as well as the parathyroid injury and   resultant hypocalcemia. She wished to proceed. DESCRIPTION OF PROCEDURE: The patient was taken to the   operating room and placed on the operating table in the supine   position and underwent general anesthesia and the roll was placed   underneath the shoulders, neck was placed in mild hyperextension,   and the neck was prepped and draped in the usual sterile fashion. After appropriate time-out and antibiotics were given, a curvilinear   incision was made along one of Marsha's lines through the old scar that   was done for her prior right lobectomy so electrocautery was used to   go through the subcutaneous tissues and through the platysma and   subplatysmal flaps were developed to the thyroid cartilage and inferior   to the sternal notch.  Then we dissected and worked on the left lobe   and dissected it out, taking fine silk ties and Ligaclips on the   lymphovascular channels that the upper pole and also in the lower   pole. We gently teased parathyroidal tissue and also recurrent   laryngeal nerve was identified and spared and in the process of   dissection, maintained away from there. It was actually not too difficult   to get it from the substernal component, but ultimately we were able to   pull it all up and off the ligament of Reyes and there was   no component of the right-sided thyroid left and we did not bother   reexploring the area. Once that was completed, irrigation and   suctioning performed of ongoing bleeding and we were able to control with ligaclips. A   Stitch was placed at the upper pole on the left lobe. Interrupted 2-0 Vicryl was used   to approximate the platysma and interrupted 2-0 Vicryl was used to   approximate the strap muscles, interrupted 3-0 Vicryl was used to   reapproximate the platysma. Running 4-0 Vicryl used to close the skin   and a Dermabond dressing applied. Upon completion of the operation,   the needle, sponge, and instrument counts were correct x2. The   patient tolerated the procedure well and was extubated and brought to   the recovery room. MICHAEL J. Ellis Canavan, MD Verdon Fu / Yesica.Ivonne   D:  02/02/2017   14:55   T:  02/02/2017   15:32   Job #:  [id number]

## 2017-02-02 NOTE — ROUTINE PROCESS
End of Shift Nursing Note    Bedside shift change report given to Bella Mariscal RN (oncoming nurse) by Collette Munson RN (offgoing nurse). Report included the following information SBAR. [facility] Phone:   [phone]    Significant changes during shift:    Emergency trach tray at bedside. Non-emergent issues for physician to address:        Number times ambulated in hallway past shift: 0      Number of times OOB to chair past shift: 0    POD #:      Vital Signs:    Temp: 98.9 °F (37.2 °C)     Pulse (Heart Rate): 85     BP: 147/71     Resp Rate: 16     O2 Sat (%): 92 %    Lines & Drains:     Urinary Catheter? NO   Placement Date:    Medical Necessity:   Central Line? NO   Placement Date:    Medical Necessity:   PICC Line? NO   Placement Date:    Medical Necessity:     NG tube [] in [] removed [] not applicable   Drains [] in [] removed [] not applicable     Skin Integrity:      Wounds: yes   Dressings Present: yes    Wound Concerns: no      GI:    Current diet:  DIET CLEAR LIQUID    Nausea: NO  Vomiting: NO  Bowel Sounds: NO  Flatus: NO  Last Bowel Movement: today   Appearance:     Respiratory:  Supplemental O2: NO      Device:    via  Liters/min     Incentive Spirometer: YES  Volume:   Coughing and Deep Breathing: YES  Oral Care: YES  Understanding (patient/family education): YES   Getting out of bed: YES  Head of bed elevation: YES    Patient Safety:    Falls Score: 1  Mobility Score: 1  Bed Alarm On? NO  Sitter? NO      Opportunity for questions and clarification was given to oncoming nurse. Patient bed is in Lowest position, side rails are up x w, door & observation blinds open as needed, call bell within reach and patient not in distress.     Leonel Ward RN

## 2017-02-03 VITALS
WEIGHT: 134.48 LBS | BODY MASS INDEX: 22.41 KG/M2 | HEIGHT: 65 IN | HEART RATE: 103 BPM | OXYGEN SATURATION: 99 % | RESPIRATION RATE: 16 BRPM | SYSTOLIC BLOOD PRESSURE: 148 MMHG | DIASTOLIC BLOOD PRESSURE: 72 MMHG | TEMPERATURE: 98.3 F

## 2017-02-03 LAB
ANION GAP BLD CALC-SCNC: 13 MMOL/L (ref 5–15)
BUN SERPL-MCNC: 11 MG/DL (ref 6–20)
BUN/CREAT SERPL: 18 (ref 12–20)
CALCIUM SERPL-MCNC: 8.4 MG/DL (ref 8.5–10.1)
CHLORIDE SERPL-SCNC: 104 MMOL/L (ref 97–108)
CO2 SERPL-SCNC: 21 MMOL/L (ref 21–32)
CREAT SERPL-MCNC: 0.62 MG/DL (ref 0.55–1.02)
GLUCOSE SERPL-MCNC: 132 MG/DL (ref 65–100)
POTASSIUM SERPL-SCNC: 3.8 MMOL/L (ref 3.5–5.1)
SODIUM SERPL-SCNC: 138 MMOL/L (ref 136–145)

## 2017-02-03 PROCEDURE — 36415 COLL VENOUS BLD VENIPUNCTURE: CPT | Performed by: SURGERY

## 2017-02-03 PROCEDURE — 80048 BASIC METABOLIC PNL TOTAL CA: CPT | Performed by: SURGERY

## 2017-02-03 PROCEDURE — 74011250637 HC RX REV CODE- 250/637: Performed by: SURGERY

## 2017-02-03 RX ADMIN — LEVOTHYROXINE SODIUM 50 MCG: 50 TABLET ORAL at 08:16

## 2017-02-03 NOTE — PROGRESS NOTES
Bedside and Verbal shift change report given to Apple Arnold RN (oncoming nurse) by Soumya Morgan RN (offgoing nurse). Report included the following information SBAR, Kardex, Intake/Output, MAR and Recent Results. Patient has discharge orders this morning. Up in room. No complaints of pain. Patient accompanied to car by son and granddaughter. Vitals stable upon discharge.

## 2017-02-03 NOTE — PROGRESS NOTES
End of Shift Nursing Note    Bedside shift change report given to ZANE Sr by Leti Haile. Report included the following information Kardex, Intake/Output, MAR, Recent Results and Cardiac Rhythm NSR. Zone Phone: [phone]    Significant changes during shift:   N/a   Non-emergent issues for physician to address: N/A           POD #: 1     Vital Signs:    Temp: 98.3 °F (36.8 °C)     Pulse (Heart Rate): (!) 103     BP: 148/72     Resp Rate: 16     O2 Sat (%): 99 %    Lines & Drains:     Urinary cath. Yes    NG tube [] in [] removed [x] not applicable   Drains [x] in [] removed [] not applicable     Skin Integrity:         Dressings Present:YES  Wound Concerns: YES thyroidectomy      GI:    Current diet:  DIET DENTAL SOFT (SOFT SOLID)    Nausea: No  Vomiting:No  Bowel Sounds: YES    Last Bowel Movement: 0700   Appearance: brown and formed      Incentive Spirometer: YES  Volume: 1200            Opportunity for questions and clarification was given to oncoming nurse. Patient bed is in Supine position, side rails are up x 2, door & observation blinds open as needed, call bell within reach and patient not in distress.     Shala Arauz RN

## 2017-02-03 NOTE — PROGRESS NOTES
Admit Date: 2017    POD 1 Day Post-Op    Procedure:  Procedure(s):  COMPLETION THYROIDECTOMY      Assessment:   Active Problems:    * No active hospital problems. *    1. Feels well  2. Minimal swelling  3. No paresthesias      Plan/Recommendations/Medical Decision Makin. Increase diet  2.  discharge    Subjective:     Patient has complaints of soreness. Objective:     Blood pressure 135/64, pulse 95, temperature 98.7 °F (37.1 °C), resp. rate 16, height 5' 5\" (1.651 m), weight 61 kg (134 lb 7.7 oz), SpO2 92 %. Temp (24hrs), Av.2 °F (36.8 °C), Min:97.2 °F (36.2 °C), Max:98.9 °F (37.2 °C)      Physical Exam:  THROAT & NECK: normal, no erythema or exudates noted.   and neck supple and symmetrical.  incision CDI, mild swelling, LUNG: clear to auscultation bilaterally, HEART: regular rate and rhythm, S1, S2 normal, no murmur, click, rub or gallop    Labs:   Recent Results (from the past 48 hour(s))   CALCIUM    Collection Time: 17  6:12 PM   Result Value Ref Range    Calcium 8.7 8.5 - 04.8 MG/DL   METABOLIC PANEL, BASIC    Collection Time: 17  3:14 AM   Result Value Ref Range    Sodium 138 136 - 145 mmol/L    Potassium 3.8 3.5 - 5.1 mmol/L    Chloride 104 97 - 108 mmol/L    CO2 21 21 - 32 mmol/L    Anion gap 13 5 - 15 mmol/L    Glucose 132 (H) 65 - 100 mg/dL    BUN 11 6 - 20 MG/DL    Creatinine 0.62 0.55 - 1.02 MG/DL    BUN/Creatinine ratio 18 12 - 20      GFR est AA >60 >60 ml/min/1.73m2    GFR est non-AA >60 >60 ml/min/1.73m2    Calcium 8.4 (L) 8.5 - 10.1 MG/DL       Data Review images and reports reviewed

## 2017-02-10 ENCOUNTER — TELEPHONE (OUTPATIENT)
Dept: SURGERY | Age: 82
End: 2017-02-10

## 2017-02-10 ENCOUNTER — OFFICE VISIT (OUTPATIENT)
Dept: SURGERY | Age: 82
End: 2017-02-10

## 2017-02-10 VITALS
SYSTOLIC BLOOD PRESSURE: 137 MMHG | RESPIRATION RATE: 16 BRPM | HEART RATE: 71 BPM | OXYGEN SATURATION: 98 % | DIASTOLIC BLOOD PRESSURE: 70 MMHG | WEIGHT: 134 LBS | BODY MASS INDEX: 22.33 KG/M2 | HEIGHT: 65 IN

## 2017-02-10 DIAGNOSIS — E89.0 POST-OPERATIVE HYPOTHYROIDISM: Primary | ICD-10-CM

## 2017-02-10 NOTE — TELEPHONE ENCOUNTER
Patient has set up her labwork, does not know if she needs to fast. If no answer, you can leave a message.

## 2017-02-10 NOTE — MR AVS SNAPSHOT
Visit Information Date & Time Provider Department Dept. Phone Encounter #  
 2/10/2017 10:20 AM Laura Mello MD [facility] [id number] Upcoming Health Maintenance Date Due DTaP/Tdap/Td series (1 - Tdap) 6/9/1950 ZOSTER VACCINE AGE 60> 6/9/1989 GLAUCOMA SCREENING Q2Y 6/9/1994 OSTEOPOROSIS SCREENING (DEXA) 6/9/1994 Pneumococcal 65+ Low/Medium Risk (1 of 2 - PCV13) 6/9/1994 MEDICARE YEARLY EXAM 6/9/1994 Allergies as of 2/10/2017  Review Complete On: 2/10/2017 By: Laura Mello MD  
 No Known Allergies Current Immunizations  Never Reviewed Name Date Influenza Vaccine 10/1/2016 Not reviewed this visit You Were Diagnosed With   
  
 Codes Comments Post-operative hypothyroidism    -  Primary ICD-10-CM: E89.0 ICD-9-CM: 244.0 Vitals BP Pulse Resp Height(growth percentile) Weight(growth percentile) SpO2  
 137/70 (BP 1 Location: Right arm, BP Patient Position: Sitting) 71 16 5' 5\" (1.651 m) 134 lb (60.8 kg) 98% BMI OB Status Smoking Status 22.3 kg/m2 Postmenopausal Never Smoker BMI and BSA Data Body Mass Index Body Surface Area  
 22.3 kg/m 2 1.67 m 2 Preferred Pharmacy Pharmacy Name Phone [facility] [address] [place] [address] Dr Patel, [address] [phone] Your Updated Medication List  
  
   
This list is accurate as of: 2/10/17 11:12 AM.  Always use your most recent med list.  
  
  
  
  
 aspirin delayed-release 81 mg tablet Take 81 mg by mouth daily. HYDROcodone-acetaminophen 5-325 mg per tablet Commonly known as:  Dyan Brandin Take 1-2 Tabs by mouth every six (6) hours as needed for Pain. Max Daily Amount: 8 Tabs. OTHER Take 2.5 mg by mouth. Take Besylate 2.5mg PRESERVISION AREDS 2 (OMEGA-3) PO Take 1 Tab by mouth daily. synthroid 50 mcg tablet Generic drug:  levothyroxine Take 50 mcg by mouth Daily (before breakfast). VITAMIN B-12 500 mcg tablet Generic drug:  cyanocobalamin Take 1,000 mcg by mouth daily. VITAMIN D3 PO Take  by mouth. We Performed the Following METABOLIC PANEL, BASIC [10308 CPT(R)] T4, FREE [id number] CPT(R)] TSH 3RD GENERATION [78716 CPT(R)] Introducing [facility]! [facility] introduces Sevcon patient portal. Now you can access parts of your medical record, email your doctor's office, and request medication refills online. 1. In your internet browser, go to [email or web address] 2. Click on the First Time User? Click Here link in the Sign In box. You will see the New Member Sign Up page. 3. Enter your Sevcon Access Code exactly as it appears below. You will not need to use this code after youve completed the sign-up process. If you do not sign up before the expiration date, you must request a new code. · Sevcon Access Code: [email or web address] Expires: 4/5/2017  4:53 PM 
 
4. Enter the last four digits of your Social Security Number (xxxx) and Date of Birth (mm/dd/yyyy) as indicated and click Submit. You will be taken to the next sign-up page. 5. Create a Sevcon ID. This will be your Sevcon login ID and cannot be changed, so think of one that is secure and easy to remember. 6. Create a Sevcon password. You can change your password at any time. 7. Enter your Password Reset Question and Answer. This can be used at a later time if you forget your password. 8. Enter your e-mail address. You will receive e-mail notification when new information is available in [address] 9. Click Sign Up. You can now view and download portions of your medical record. 10. Click the Download Summary menu link to download a portable copy of your medical information.  
 
If you have questions, please visit the Frequently Asked Questions section of the Sendmail. Remember, [employer]lighten [employer] is NOT to be used for urgent needs. For medical emergencies, dial 911. Now available from your iPhone and Android! Please provide this summary of care documentation to your next provider. Your primary care clinician is listed as [address] [place] [address] Bruce. If you have any questions after today's visit, please call [phone].

## 2017-02-10 NOTE — PROGRESS NOTES
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

## 2017-03-13 ENCOUNTER — OFFICE VISIT (OUTPATIENT)
Dept: SURGERY | Age: 82
End: 2017-03-13

## 2017-03-13 ENCOUNTER — TELEPHONE (OUTPATIENT)
Dept: SURGERY | Age: 82
End: 2017-03-13

## 2017-03-13 VITALS
RESPIRATION RATE: 20 BRPM | SYSTOLIC BLOOD PRESSURE: 144 MMHG | WEIGHT: 136 LBS | BODY MASS INDEX: 22.66 KG/M2 | HEART RATE: 73 BPM | DIASTOLIC BLOOD PRESSURE: 66 MMHG | HEIGHT: 65 IN

## 2017-03-13 DIAGNOSIS — Z09 POSTOPERATIVE EXAMINATION: Primary | ICD-10-CM

## 2017-03-13 NOTE — TELEPHONE ENCOUNTER
Received call from pt, patient would like to know if she should continue taking Tums, please advise.

## 2017-03-13 NOTE — TELEPHONE ENCOUNTER
Spoke to pt, advised per Dr. Delores joseph to stop TUMS.        She can stop the [facility]     Filipe Ochoa MD FACS

## 2017-05-30 ENCOUNTER — APPOINTMENT (OUTPATIENT)
Dept: GENERAL RADIOLOGY | Age: 82
End: 2017-05-30
Attending: PHYSICIAN ASSISTANT
Payer: MEDICARE

## 2017-05-30 ENCOUNTER — APPOINTMENT (OUTPATIENT)
Dept: CT IMAGING | Age: 82
End: 2017-05-30
Attending: EMERGENCY MEDICINE
Payer: MEDICARE

## 2017-05-30 ENCOUNTER — HOSPITAL ENCOUNTER (EMERGENCY)
Age: 82
End: 2017-05-30
Attending: EMERGENCY MEDICINE
Payer: MEDICARE

## 2017-05-30 ENCOUNTER — HOSPITAL ENCOUNTER (EMERGENCY)
Age: 82
Discharge: HOME | End: 2017-05-30
Attending: EMERGENCY MEDICINE | Admitting: EMERGENCY MEDICINE
Payer: MEDICARE

## 2017-05-30 VITALS
BODY MASS INDEX: 22.47 KG/M2 | DIASTOLIC BLOOD PRESSURE: 74 MMHG | TEMPERATURE: 98.3 F | HEART RATE: 60 BPM | SYSTOLIC BLOOD PRESSURE: 144 MMHG | RESPIRATION RATE: 16 BRPM | WEIGHT: 135 LBS | OXYGEN SATURATION: 99 %

## 2017-05-30 DIAGNOSIS — R42 DIZZINESS: Primary | ICD-10-CM

## 2017-05-30 LAB
ALBUMIN SERPL BCP-MCNC: 3.9 G/DL (ref 3.5–5)
ALBUMIN/GLOB SERPL: 1.1 {RATIO} (ref 1.1–2.2)
ALP SERPL-CCNC: 85 U/L (ref 45–117)
ALT SERPL-CCNC: 25 U/L (ref 12–78)
ANION GAP BLD CALC-SCNC: 7 MMOL/L (ref 5–15)
APPEARANCE UR: CLEAR
AST SERPL W P-5'-P-CCNC: 15 U/L (ref 15–37)
BASOPHILS # BLD AUTO: 0 K/UL (ref 0–0.1)
BASOPHILS # BLD: 0 % (ref 0–1)
BILIRUB SERPL-MCNC: 0.4 MG/DL (ref 0.2–1)
BILIRUB UR QL: NEGATIVE
BUN SERPL-MCNC: 16 MG/DL (ref 6–20)
BUN/CREAT SERPL: 28 (ref 12–20)
CALCIUM SERPL-MCNC: 9.3 MG/DL (ref 8.5–10.1)
CHLORIDE SERPL-SCNC: 100 MMOL/L (ref 97–108)
CK SERPL-CCNC: 74 U/L (ref 26–192)
CO2 SERPL-SCNC: 27 MMOL/L (ref 21–32)
COLOR UR: NORMAL
CREAT SERPL-MCNC: 0.57 MG/DL (ref 0.55–1.02)
EOSINOPHIL # BLD: 0.1 K/UL (ref 0–0.4)
EOSINOPHIL NFR BLD: 2 % (ref 0–7)
ERYTHROCYTE [DISTWIDTH] IN BLOOD BY AUTOMATED COUNT: 13.5 % (ref 11.5–14.5)
GLOBULIN SER CALC-MCNC: 3.6 G/DL (ref 2–4)
GLUCOSE SERPL-MCNC: 93 MG/DL (ref 65–100)
GLUCOSE UR STRIP.AUTO-MCNC: NEGATIVE MG/DL
HCT VFR BLD AUTO: 42.5 % (ref 35–47)
HGB BLD-MCNC: 14.2 G/DL (ref 11.5–16)
HGB UR QL STRIP: NEGATIVE
KETONES UR QL STRIP.AUTO: NEGATIVE MG/DL
LEUKOCYTE ESTERASE UR QL STRIP.AUTO: NEGATIVE
LYMPHOCYTES # BLD AUTO: 30 % (ref 12–49)
LYMPHOCYTES # BLD: 1.9 K/UL (ref 0.8–3.5)
MCH RBC QN AUTO: 29.5 PG (ref 26–34)
MCHC RBC AUTO-ENTMCNC: 33.4 G/DL (ref 30–36.5)
MCV RBC AUTO: 88.2 FL (ref 80–99)
MONOCYTES # BLD: 0.6 K/UL (ref 0–1)
MONOCYTES NFR BLD AUTO: 10 % (ref 5–13)
NEUTS SEG # BLD: 3.7 K/UL (ref 1.8–8)
NEUTS SEG NFR BLD AUTO: 58 % (ref 32–75)
NITRITE UR QL STRIP.AUTO: NEGATIVE
PH UR STRIP: 6.5 [PH] (ref 5–8)
PLATELET # BLD AUTO: 238 K/UL (ref 150–400)
POTASSIUM SERPL-SCNC: 4.3 MMOL/L (ref 3.5–5.1)
PROT SERPL-MCNC: 7.5 G/DL (ref 6.4–8.2)
PROT UR STRIP-MCNC: NEGATIVE MG/DL
RBC # BLD AUTO: 4.82 M/UL (ref 3.8–5.2)
SODIUM SERPL-SCNC: 134 MMOL/L (ref 136–145)
SP GR UR REFRACTOMETRY: 1.01 (ref 1–1.03)
TROPONIN I SERPL-MCNC: <0.04 NG/ML
UROBILINOGEN UR QL STRIP.AUTO: 0.2 EU/DL (ref 0.2–1)
WBC # BLD AUTO: 6.4 K/UL (ref 3.6–11)

## 2017-05-30 PROCEDURE — 75810000275 HC EMERGENCY DEPT VISIT NO LEVEL OF CARE

## 2017-05-30 PROCEDURE — 71020 XR CHEST PA LAT: CPT

## 2017-05-30 PROCEDURE — 99283 EMERGENCY DEPT VISIT LOW MDM: CPT

## 2017-05-30 PROCEDURE — 70450 CT HEAD/BRAIN W/O DYE: CPT

## 2017-05-30 PROCEDURE — 84484 ASSAY OF TROPONIN QUANT: CPT | Performed by: PHYSICIAN ASSISTANT

## 2017-05-30 PROCEDURE — 81003 URINALYSIS AUTO W/O SCOPE: CPT | Performed by: PHYSICIAN ASSISTANT

## 2017-05-30 PROCEDURE — 85025 COMPLETE CBC W/AUTO DIFF WBC: CPT | Performed by: PHYSICIAN ASSISTANT

## 2017-05-30 PROCEDURE — 82550 ASSAY OF CK (CPK): CPT | Performed by: PHYSICIAN ASSISTANT

## 2017-05-30 PROCEDURE — 80053 COMPREHEN METABOLIC PANEL: CPT | Performed by: PHYSICIAN ASSISTANT

## 2017-05-30 PROCEDURE — 93005 ELECTROCARDIOGRAM TRACING: CPT

## 2017-05-30 RX ORDER — MECLIZINE HCL 25MG 25 MG/1
25 TABLET, CHEWABLE ORAL
Qty: 12 TAB | Refills: 0 | Status: SHIPPED | OUTPATIENT
Start: 2017-05-30 | End: 2017-06-09

## 2017-05-31 LAB
ATRIAL RATE: 68 BPM
CALCULATED P AXIS, ECG09: 57 DEGREES
CALCULATED R AXIS, ECG10: 57 DEGREES
CALCULATED T AXIS, ECG11: 63 DEGREES
DIAGNOSIS, 93000: NORMAL
P-R INTERVAL, ECG05: 180 MS
Q-T INTERVAL, ECG07: 388 MS
QRS DURATION, ECG06: 84 MS
QTC CALCULATION (BEZET), ECG08: 412 MS
VENTRICULAR RATE, ECG03: 68 BPM

## 2017-05-31 NOTE — ED NOTES
Pt discharged from ED with prescription and follow up care instructions reviewed by the PA. Pt denies pain at this time. VSS. NAD. Pt ambulatory from ED with steady gait.
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
## 2017-06-21 ENCOUNTER — HOSPITAL ENCOUNTER (OUTPATIENT)
Dept: GENERAL RADIOLOGY | Age: 82
Discharge: HOME | End: 2017-06-21
Attending: INTERNAL MEDICINE
Payer: MEDICARE

## 2017-06-21 DIAGNOSIS — R13.10 DIFFICULTY IN SWALLOWING: ICD-10-CM

## 2017-06-21 PROCEDURE — 74241 XR UPPER GI W KUB/ BA SWALLOW: CPT

## 2017-07-19 RX ORDER — NEBIVOLOL 10 MG/1
10 TABLET ORAL DAILY
Qty: 30 TAB | Refills: 6 | Status: SHIPPED | OUTPATIENT
Start: 2017-07-19 | End: 2017-08-29

## 2017-07-24 ENCOUNTER — TELEPHONE ANTICOAG (OUTPATIENT)
Dept: INTERNAL MEDICINE CLINIC | Age: 82
End: 2017-07-24

## 2017-07-24 NOTE — PROGRESS NOTES
Pt called to state Bystolic is to expensive for her $124.00 for #30 tablets. She will need something cheaper. Dr. Tutu Rosales reviewed medical record and changed to Metoprolol ER 25 mg take 1/2 daily. Pt called and advised. Pt verbalized understanding.

## 2017-08-10 ENCOUNTER — TELEPHONE (OUTPATIENT)
Dept: INTERNAL MEDICINE CLINIC | Age: 82
End: 2017-08-10

## 2017-08-10 NOTE — TELEPHONE ENCOUNTER
Patient called and would like to know can she stop taking the Metoprolol due to its side effects (memory loss).

## 2017-08-15 PROBLEM — I10 HYPERTENSION: Status: ACTIVE | Noted: 2017-08-15

## 2017-08-15 PROBLEM — I25.10 ASHD (ARTERIOSCLEROTIC HEART DISEASE): Status: ACTIVE | Noted: 2017-08-15

## 2017-08-15 PROBLEM — G31.84 MILD COGNITIVE IMPAIRMENT WITH MEMORY LOSS: Status: ACTIVE | Noted: 2017-08-15

## 2017-08-15 PROBLEM — R55 NEAR SYNCOPE: Status: ACTIVE | Noted: 2017-08-15

## 2017-08-15 PROBLEM — H91.90 HOH (HARD OF HEARING): Status: ACTIVE | Noted: 2017-08-15

## 2017-08-15 PROBLEM — E55.9 VITAMIN D DEFICIENCY: Status: ACTIVE | Noted: 2017-08-15

## 2017-08-15 PROBLEM — M81.0 OSTEOPOROSIS: Status: ACTIVE | Noted: 2017-08-15

## 2017-08-15 PROBLEM — H35.30 ARMD (AGE RELATED MACULAR DEGENERATION): Status: ACTIVE | Noted: 2017-08-15

## 2017-08-15 PROBLEM — E78.5 DYSLIPIDEMIA: Status: ACTIVE | Noted: 2017-08-15

## 2017-08-15 PROBLEM — N39.0 FREQUENT UTI: Status: ACTIVE | Noted: 2017-08-15

## 2017-08-15 PROBLEM — L65.9 LOSS OF HAIR: Status: ACTIVE | Noted: 2017-08-15

## 2017-08-15 PROBLEM — E03.8 ADULT ONSET HYPOTHYROIDISM: Status: ACTIVE | Noted: 2017-08-15

## 2017-08-15 PROBLEM — B02.9 HERPES ZOSTER WITHOUT COMPLICATION: Status: ACTIVE | Noted: 2017-08-15

## 2017-08-15 PROBLEM — R13.10 DYSPHAGIA: Status: ACTIVE | Noted: 2017-08-15

## 2017-08-15 PROBLEM — Z85.828 H/O BASAL CELL CARCINOMA EXCISION: Status: ACTIVE | Noted: 2017-08-15

## 2017-08-15 PROBLEM — R14.0 ABDOMINAL BLOATING: Status: ACTIVE | Noted: 2017-08-15

## 2017-08-15 PROBLEM — S16.1XXA CERVICAL STRAIN: Status: ACTIVE | Noted: 2017-08-15

## 2017-08-15 PROBLEM — Z80.0 FAMILY HISTORY OF COLON CANCER: Status: ACTIVE | Noted: 2017-08-15

## 2017-08-15 PROBLEM — Z86.73 HISTORY OF TIA (TRANSIENT ISCHEMIC ATTACK): Status: ACTIVE | Noted: 2017-08-15

## 2017-08-15 PROBLEM — R25.2 LEG CRAMPS: Status: ACTIVE | Noted: 2017-08-15

## 2017-08-15 PROBLEM — F41.9 ANXIETY: Status: ACTIVE | Noted: 2017-08-15

## 2017-08-15 PROBLEM — S83.249A MEDIAL MENISCUS TEAR: Status: ACTIVE | Noted: 2017-08-15

## 2017-08-15 PROBLEM — D48.9 VILLOUS ADENOMA: Status: ACTIVE | Noted: 2017-08-15

## 2017-08-15 PROBLEM — M17.0 OSTEOARTHRITIS OF BOTH KNEES: Status: ACTIVE | Noted: 2017-08-15

## 2017-08-15 PROBLEM — R32 URINARY INCONTINENCE: Status: ACTIVE | Noted: 2017-08-15

## 2017-08-15 PROBLEM — Z98.890 H/O BASAL CELL CARCINOMA EXCISION: Status: ACTIVE | Noted: 2017-08-15

## 2017-08-15 RX ORDER — OMEPRAZOLE 40 MG/1
40 CAPSULE, DELAYED RELEASE ORAL DAILY
COMMUNITY
End: 2017-11-30

## 2017-08-23 ENCOUNTER — LAB ONLY (OUTPATIENT)
Dept: INTERNAL MEDICINE CLINIC | Age: 82
End: 2017-08-23

## 2017-08-23 DIAGNOSIS — E03.8 ADULT ONSET HYPOTHYROIDISM: ICD-10-CM

## 2017-08-23 DIAGNOSIS — Z00.00 ROUTINE GENERAL MEDICAL EXAMINATION AT A HEALTH CARE FACILITY: Primary | ICD-10-CM

## 2017-08-24 LAB
ALBUMIN SERPL-MCNC: 4.4 G/DL (ref 3.9–5.4)
ALKALINE PHOS POC: 84 U/L (ref 38–126)
ALT SERPL-CCNC: 23 U/L (ref 9–52)
AST SERPL-CCNC: 27 U/L (ref 14–36)
BILIRUB UR QL STRIP: NEGATIVE
BUN BLD-MCNC: 15 MG/DL (ref 7–17)
CALCIUM BLD-MCNC: 9.4 MG/DL (ref 8.4–10.2)
CHLORIDE BLD-SCNC: 102 MMOL/L (ref 98–107)
CHOLEST SERPL-MCNC: 240 MG/DL (ref 0–200)
CO2 POC: 30 MMOL/L (ref 22–32)
CREAT BLD-MCNC: 0.6 MG/DL (ref 0.7–1.2)
EGFR (POC): 81.4
GLUCOSE POC: 88 MG/DL (ref 65–105)
GLUCOSE UR-MCNC: NEGATIVE MG/DL
GRAN# POC: 3.5 K/UL (ref 2–7.8)
GRAN% POC: 61 % (ref 37–92)
HCT VFR BLD CALC: 43.1 % (ref 37–51)
HDLC SERPL-MCNC: 87 MG/DL (ref 35–130)
HGB BLD-MCNC: 14.4 G/DL (ref 12–18)
IRON POC: 93 UG/DL (ref 37–170)
IRON SATURATION POC: 28 % (ref 15–55)
KETONES P FAST UR STRIP-MCNC: NEGATIVE MG/DL
LDL CHOLESTEROL POC: 135.4 MG/DL (ref 0–130)
LY# POC: 1.8 K/UL (ref 0.6–4.1)
LY% POC: 33.6 % (ref 10–58.5)
MCH RBC QN: 29.6 PG (ref 26–32)
MCHC RBC-ENTMCNC: 33.3 G/DL (ref 30–36)
MCV RBC: 89 FL (ref 80–97)
MID #, POC: 0.2 K/UL (ref 0–1.8)
MID% POC: 5.4 % (ref 0.1–24)
PH UR STRIP: 6.5 [PH] (ref 5–7)
PLATELET # BLD: 200 K/UL (ref 140–440)
POTASSIUM SERPL-SCNC: 4.6 MMOL/L (ref 3.6–5)
PROT SERPL-MCNC: 7.1 G/DL (ref 6.3–8.2)
PROT UR QL STRIP: NEGATIVE MG/DL
RBC # BLD: 4.85 M/UL (ref 4.2–6.3)
SODIUM SERPL-SCNC: 144 MMOL/L (ref 137–145)
SP GR UR STRIP: 1.01 (ref 1.01–1.02)
T4 FREE SERPL-MCNC: 1.18 NG/DL (ref 0.71–1.85)
TCHOL/HDL RATIO (POC): 2.8 (ref 0–4)
TIBC POC: 332 UG/DL (ref 265–497)
TOTAL BILIRUBIN POC: 0.9 MG/DL (ref 0.2–1.3)
TRIGL SERPL-MCNC: 88 MG/DL (ref 0–200)
TSH BLD-ACNC: 2.77 UIU/ML (ref 0.4–4.2)
UA UROBILINOGEN AMB POC: NORMAL (ref 0.2–1)
URINALYSIS CLARITY POC: CLEAR
URINALYSIS COLOR POC: NORMAL
URINE BLOOD POC: NEGATIVE
URINE LEUKOCYTES POC: NEGATIVE
URINE NITRITES POC: NEGATIVE
VITAMIN D POC: 46.6 NG/ML (ref 30–96)
VLDLC SERPL CALC-MCNC: 17.6 MG/DL
WBC # BLD: 5.5 K/UL (ref 4.1–10.9)

## 2017-08-29 PROBLEM — S16.1XXA CERVICAL STRAIN: Status: RESOLVED | Noted: 2017-08-15 | Resolved: 2017-08-29

## 2017-08-29 PROBLEM — E89.0 POST-OPERATIVE HYPOTHYROIDISM: Status: RESOLVED | Noted: 2017-01-20 | Resolved: 2017-08-29

## 2017-08-29 PROBLEM — M19.90 DJD (DEGENERATIVE JOINT DISEASE): Status: ACTIVE | Noted: 2017-08-29

## 2017-08-29 PROBLEM — R13.10 DYSPHAGIA: Status: RESOLVED | Noted: 2017-08-15 | Resolved: 2017-08-29

## 2017-08-29 RX ORDER — AMLODIPINE BESYLATE 2.5 MG/1
2.5 TABLET ORAL DAILY
COMMUNITY
End: 2017-12-26 | Stop reason: SDUPTHER

## 2017-08-29 RX ORDER — LEVOTHYROXINE SODIUM 75 UG/1
75 TABLET ORAL
COMMUNITY
End: 2018-04-05 | Stop reason: SDUPTHER

## 2017-08-29 RX ORDER — METOPROLOL SUCCINATE 25 MG/1
12.5 TABLET, EXTENDED RELEASE ORAL DAILY
COMMUNITY
End: 2017-11-30

## 2017-08-30 ENCOUNTER — OFFICE VISIT (OUTPATIENT)
Dept: INTERNAL MEDICINE CLINIC | Age: 82
End: 2017-08-30

## 2017-08-30 VITALS
WEIGHT: 135 LBS | HEART RATE: 72 BPM | SYSTOLIC BLOOD PRESSURE: 133 MMHG | OXYGEN SATURATION: 96 % | DIASTOLIC BLOOD PRESSURE: 68 MMHG | BODY MASS INDEX: 22.49 KG/M2 | TEMPERATURE: 97.7 F | HEIGHT: 65 IN | RESPIRATION RATE: 20 BRPM

## 2017-08-30 DIAGNOSIS — E03.8 ADULT ONSET HYPOTHYROIDISM: Primary | ICD-10-CM

## 2017-08-30 DIAGNOSIS — E78.5 DYSLIPIDEMIA: ICD-10-CM

## 2017-08-30 DIAGNOSIS — G31.84 MILD COGNITIVE IMPAIRMENT WITH MEMORY LOSS: ICD-10-CM

## 2017-08-30 DIAGNOSIS — F41.9 ANXIETY: ICD-10-CM

## 2017-08-30 DIAGNOSIS — M81.0 OSTEOPOROSIS, UNSPECIFIED OSTEOPOROSIS TYPE, UNSPECIFIED PATHOLOGICAL FRACTURE PRESENCE: ICD-10-CM

## 2017-08-30 DIAGNOSIS — I10 ESSENTIAL HYPERTENSION: ICD-10-CM

## 2017-08-30 DIAGNOSIS — S80.01XA CONTUSION OF RIGHT KNEE, INITIAL ENCOUNTER: ICD-10-CM

## 2017-08-30 NOTE — PROGRESS NOTES
Ms. Salvador Torres is an 80year old, ,  female who comes to the office today for annual comprehensive personal healthcare examination. History of Present Illness: This patient has multiple medical problems. These include:  1. Coronary artery disease by calcium scoring study. She has been asymptomatic with regard to angina or symptoms of congestive heart failure, however. She does have a fairly high calcium score, however. 2. Hypothyroidism, status post thyroidectomy in stages. 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 She is currently euthyroid, on replacement. 3. Hyperlipidemia with high HDL cholesterol. She is intolerant to statins. 4. History of villous adenoma, status post laparoscopic right colectomy. 5. Family history of colon cancer. 6. Osteoporosis. We have not treated it to this point because of her declining treatment. 7. Mild cognitive impairment with some short term memory loss. She does not appear to have true dementia. 8. History of urinary incontinence, status post Contigen implants. 9. History of recurrent UTIs. 10. Remote history of transient ischemic attack with no recent recurrence. 11. History of near syncope in the past, as well. She's had no syncopal episodes recently. 12. History of skin cancer of the face and right lower eyelid, status post excisions. 13. History of hair loss, stable. 14. History of night cramps, stable. 15. 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 16. History of right medical meniscal tear that resolved with conservative therapy. 16. Hard of hearing. 18. History of herpes zoster. 19. Anxiety. 20. Hypertension, adequate control. 21. Vitamin D deficiency. 25. Age related macular degeneration, which has not been progressive. At the time of the visit, she did note that she had recently fallen on a slippery floor at [facility]. She bruised both of her knees.   She's not having difficulties with weightbearing, however. She does note some persistent pain in the right knee in particular, as well as some stiffness. She's had no give-way weakness. She did not hit her head during the fall or injure other joints other than her left ankle was sore for a short period of time, mildly swollen, but this has resolved. She otherwise denied new cardiorespiratory, GI,  or musculoskeletal complaints. Past Medical History:  Otherwise remarkable for bilateral cataract extractions and the other surgeries as noted above. Allergies:  None known, though she is intolerant to statins. Current Medications:  1. Aspirin 81 mg daily. 2. Levothyroxine 50 mcg on Saturday and  and 75 mcg daily Monday through Friday. 3. PreserVision AREDS 2, one twice a day. 4. Vitamin B12 1,000 mcg daily. 5. TheraTears as needed. 6. Metoprolol ER 25 mg, one half tablet daily. 7. Amlodipine 2.5 mg daily. 8. Vitamin D 1,000 IU daily. She is not interested in further lipid reduction and I don't think she needs it given her high HDL and her age and intolerance to statins. She's not been interested in therapy for osteoporosis in the past, but will consider it depending upon the results of the new bone density. She did have one recent 10 minute episode of what she felt to be palpitations or rapid heart rate. She has had no further recurrences. She should be treated for this with the beta blocker that she is on. Social History:  She is now . She is retired. She does not smoke . She does not drink alcohol. She does exercise regularly. Family History: Mother had Alzheimer's type dementia and stomach cancer. She  at age 80. Father  of natural causes at age 80. She has multiple siblings with medical problems that have included colon cancer, pancreatic cancer, heart disease and a cerebral hemorrhage.     Review of Systems:  CONSTITUTIONAL:  She denies fever, weight loss, sweats or fatigue. HEENT:  No blurred or double vision, headache or dizziness. No difficulty with swallowing, taste, speech or smell. Decreased auditory acuity bilaterally. RESPIRATORY:  No cough, wheezing or shortness of breath. No sputum production. CARDIAC:  Denies chest pain, palpitations, unexplained indigestion, syncope, edema, PND or orthopnea. GI:  No changes in bowel movements, no abdominal pain, no bloating, anorexia, nausea, vomiting or heartburn. BREASTS:  She does monthly self-breast examination. No masses have been felt. No nipple discharge noted. GYN:  Denies vaginal discharge or unexpected bleeding. :  Occasional incontinence. EXTREMITIES:  No joint pain, stiffness or swelling. SKIN:  No recent rashes or mole changes. NEUROLOGICAL:  No numbness, tingling, burning paresthesias or loss of motor strength. No syncope, dizziness, frequent headaches. Some difficulties with short term memory and remembering names. Physical Examination:  GENERAL:  Well-developed, well-nourished, looks younger than her stated age, no acute distress. VITAL SIGNS:  BP: 133/68. P: 72.  R: 20.  T:  97.7. HT: 5'5\". WT: 135 lbs. BMI: 22.5. VISION:  Deferred to ophthalmologist.  HEARING: Deferred as she has been noted to have hearing issues in the past and has been evaluated for hearing aids. HEENT:  Ears:  The TMs and ear canals were clear. Eyes:  The pupillary responses were normal.  Extraocular muscle function intact. Lids and conjunctivae not injected. Fundoscopic exam revealed sharp disc margins. Pharynx:  Clear with teeth in good repair. No masses were noted. NECK:  Well healed thyroidectomy incision anteriorly. Supple without adenopathy. No JVD noted. No carotid bruits. LUNGS:  Clear to auscultation and percussion. CARDIAC:  Regular rate and rhythm with grade 1/6 systolic murmur. PMI not displaced. No gallop, rub or click. ABDOMEN:  Flat, soft, non-tender without palpable organomegaly or mass. No pulsatile mass was felt and no bruit was heard. Bowel sounds were active. BREASTS:  Both symmetric. No palpable masses felt. No skin retractions noted. No nipple discharge evident. GYN:  Deferred. RECTAL EXAM:  Deferred. EXTREMITIES:  No clubbing, cyanosis or edema. Mild lower extremity venous varicosities. There is a bruise with some swelling over her right knee/patella. There is a smaller amount of bruising and swelling over the left knee. The left ankle is not swollen currently. PULSES:  Dorsalis pedis and posterior tibial pulses felt without difficulty. SKIN:  No rash or unusual mole changes noted. LYMPH NODES:  None felt in the cervical, supraclavicular, axillary or inguinal region. NEUROLOGICAL:  Cranial nerves II-XII grossly intact. Motor strength 5/5. DTRs 2+ and symmetric. Station and gait normal.    Laboratory:  Hemoglobin is 14.4. White blood count is 5,500. Blood sugar is 88. Liver and kidney function are normal.  Electrolytes are normal.  Iron is 93. TSH is 2.77. Free thyroxine 1.18. Urinalysis is clear. Vitamin D level is 46.6. Lipid profile reveals a total cholesterol of 240, triglycerides of 88, HDL cholesterol of 87 and an LDL of 135.4. Health Maintenance Issues and Ancillary Studies:  1. TDAP (pertussis and tetanus) vaccine was given in August, 2011.  2. Pneumovax 23 (PPSV23) was given in March, 2001. 3. Seasonal influenza vaccine was given in October, 2016.  4. Shingles vaccine was given in July, 2009. 5. Prevnar 13 (PCV13) was given in August, 2015. 6. Bone density in December, 2014 revealed a T score of -2.8.  7. Colonoscopy revealed diverticulosis in March, 2013, (follow up as needed). 8. Pap smear was negative in July, 2009.  9. EBT heart scan revealed a calcium score of 1,792 in July, 2008. 10. MRI of the brain revealed some mild atrophy and white matter disease in January, 2014.   11. Stress testing was normal in January, 2014 with an ejection fraction of 79%.  12. Carotid dopplers revealed 10-49% stenosis on the right and none on the left in May, 2014. 13. CT scan of the abdomen and pelvis was negative in November, 2014. 14. Last mammogram was negative in August, 2016. 15. CT scan of the soft tissues in the neck in January, 2017 revealed enlargement of the left lobe of the thyroid and she subsequently underwent full thyroidectomy. 16. CT scan of the chest in January, 2017 was negative. 17. CT scan of the head in May, 2017 was negative. 18. Upper GI in June, 2017 negative. 19. EKG in the office is normal.  20. Pulmonary function studies revealed mild restrictive lung disease. 21. Health screening assessments are essentially normal.    Impression:  1. CAD by calcium scoring, no evidence of angina or congestive heart failure. 2. Hypothyroidism, status post full thyroidectomy in stages, now on replacement and euthyroid. 3. Hyperlipidemia. 4. History of villous adenoma, status post laparoscopic right colectomy. 5. Family history of colon cancer. 6. Osteoporosis. 7. MCI  8. Urinary incontinence, status post Contigen implants. 9. History of recurrent UTI. 10. History of TIA, remote past.  11. Status post excision, multiple skin cancers. 12. History of hair loss, stable. 13. DJD, minimal.  14. Right knee meniscal tear, resolved. 15. Vitamin D deficiency. 16. History of night cramps. 17. Contusion, right knee, with negative xrays. 18. Status post surgeries as noted. Plan:  1. Continue present medications. 2. Continue prudent diet and exercise program.  3. She should proceed with mammography and bone density. 4. She is up to date on health maintenance issues otherwise. 5. She will recheck here in three months time. 6. With regard to her knee, this should heal with time and continued ambulation. There is no evidence for any fracture.   7. Ten-year coronary heart disease risk was calculated to be greater than 7.5%, but given her age and statin intolerance and the gradual improvement of her LDL over the last three years time, I don't think further therapy is warranted. 8. Lifetime breast cancer risk was calculated to be 3.5% vs the average for her age group of 3.1%. 9. Ten-year risk for major osteoporotic fracture was not calculated as she already has mild osteoporosis. We will reevaluate this with a new bone density.

## 2017-08-31 NOTE — LETTER
2017 Ms. Patricia Fang 
[id number] Dear Ms. Fang: It was a pleasure to see you on 2017 for your annual comprehensive personal healthcare examination. This note is a follow-up to what we discussed at the time of that examination. We also discussed your laboratory studies at the time of that visit. In summary, your past and present medical history includes the followin. Coronary artery disease by calcium scoring study. You have been asymptomatic with regard to angina or symptoms of congestive heart failure despite a relatively high score. Given no symptoms, we don't need to change your treatment in any respect. 2. Hypothyroidism, status post total thyroidectomy. This was done in stages. 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 You are currently euthyroid on replacement, meaning your thyroid function is normal on medication. 3. Hyperlipidemia with high HDL cholesterol. You are intolerant to statins and at your age we would not consider treatment anyway. 4. History of villous adenoma, status post laparoscopic right colectomy. 5. Family history of colon cancer. 6. Osteoporosis. We will have to decide about treatment once we see another bone density. 7. Mild cognitive impairment with some short term memory loss. You do not appear to have true dementia, however. 8. History of urinary incontinence, for which you underwent Contigen implants. 9. History of recurrent UTIs. 10. Remote history of a transient ischemic attack, (mini stroke), with no recent recurrence. 11. History or near syncope, (loss of consciousness). This has occurred in the past as well, but you've had no recent episodes. 12. History of skin cancer of the face and right eyelid, status post excisions. 13. History of hair loss, which has stabilized. 14. History of night cramps, which are relatively quiescent. 15. Mild degenerative joint disease, particularly involving your hands and knees at times. 16. History of right medial meniscal tear that resolved with conservative therapy. 17. Decreased auditory acuity. 18. History of herpes zoster, resolved. 19. Anxiety. 20. Hypertension, adequate control. 21. Vitamin D deficiency. 25. Age related macular degeneration, which has not been progressive. At the time of the visit, you did note you'd had a recent fall on a slippery floor at [facility], bruising both of your knees. You were not having difficulties with weightbearing, but did note some persistent pain in the right knee in particular, as well as some stiffness and bruising. You did not hit your head during the fall or injure other joints other than your left ankle, which was sore for a short period of time and mildly swollen, but this has since resolved. You otherwise denied new cardiorespiratory, gastrointestinal or genitourinary symptoms. Your past medical history was otherwise remarkable for bilateral cataract extractions. Your examination revealed a normal blood pressure of 133/68. Body mass index was normal at 22.5. Vision was deferred to the ophthalmologist.  Dora Clay was deferred as you have been noted to have hearing issues in the past and have been evaluated for hearing aids. Pharynx was clear. Thyroid was not palpable and there is a well healed thyroid incision anteriorly on your neck. No carotid artery bruits were noted. Lungs were clear. Cardiovascular examination revealed a regular heart rate and rhythm with a soft systolic heart murmur. Abdomen revealed no masses or tenderness. Breasts were symmetric with no masses. Pelvic and rectal examinations were deferred. Extremities revealed mild lower extremity venous varicosities.   There was also a bruise with some swelling over your right knee and a smaller amount of bruising and swelling over the left knee.  The left ankle was not swollen at the time of the visit. The remainder of your examination was essentially normal. 
 
Your current medications are: 1. Aspirin 81 mg daily. 2. Levothyroxine 50 mcg on Saturday and  and 75 mcg daily Monday through Friday. 3. PreserVision AREDS #2, one twice a day 4. Vitamin B12 1,000 mcg daily. 5. TheraTears as needed. 6. Metoprolol ER 25 mg, one half tablet daily. 7. Amlodipine 2.5 mg daily. 8. Vitamin D 1,000 IU daily. Your laboratory studies revealed a normal hemoglobin of 14.4. White blood count was normal at 5,500. Blood sugar was normal at 88. Liver and kidney function were normal.  Electrolytes were normal.  Iron was normal at 93. TSH was normal at 2.77 and free thyroxine normal at 1.18 and you are adequately replaced with regard to your thyroid. Urinalysis was clear. Vitamin D level was normal at 46.6. Lipid profile revealed a total cholesterol of 240, triglycerides of 88, HDL cholesterol of 87 and LDL cholesterol of 135.4. Your total cholesterol is somewhat high, but given your age does not need to be treated. Your HDL cholesterol of 87 being so high also is protective for you. Health maintenance issues and ancillary studies include the followin. You are up to date on immunizations, including TDAP, pneumovax 23, seasonal influenza vaccine, shingles vaccine and Prevnar 13. 
2. Your last bone density in 2014 revealed osteoporosis and is being repeated. 3. Colonoscopy revealed diverticulosis in 2013, (follow up as needed). 4. Pap smear was negative in 2009. 5. EBT heart scan revealed a calcium score of 1,792 in . 6. MRI of the brain revealed some mild atrophy, (shrinkage), consistent with your age. There was also some hardening of the arteries. This was in . 
7. Stress testing was normal in  8.  Carotid artery dopplers revealed less than 50% stenosis on the right, which is not considered significant. You had no blockage on the left. This was in May of 2014. 
9. CT scan of the abdomen and pelvis was negative in November, 2014. 
10. Your last mammogram was negative in August, 2016 and will be repeated in the near future. 11. CT scan of the soft tissues of your neck in January, 2017 revealed the enlargement of the left lobe of your thyroid and you subsequently underwent the full thyroidectomy. 12. CT scan of the chest in January, 2017 was negative. 13. CT scan of the head in May, 2017 was negative. 14. Upper GI in June, 2017 was negative when you were having some issues with swallowing. 15. EKG in the office was normal. 
16. Pulmonary function studies revealed mild restrictive lung disease associated with your age. 17. Health screening assessments were essentially normal.   
 
We discussed the following problems and goals: 1. Continue present medications. 2. Continue prudent diet and exercise program. 
3. You should proceed with mammography and bone density. 4. You are up to date on health maintenance issues otherwise. You should recheck here in three months time. 5. With regard to your knee, this should heal with time and continued ambulation. There is no evidence for any fracture. 6. Ten year coronary heart disease risk was calculated to be greater than 7.5%, which usually indicates the need for treatment. Given your age and intolerance to statins, I don't think further therapy is warranted, however. 7. Lifetime breast cancer risk was calculated to be 3.5% versus the average for age group of 3.1%. In this setting, yearly mammography and frequent breast examination remain the mainstays of early breast cancer detection 8. Ten year risk for major osteoporotic fracture is not calculated as you already have mild osteoporosis. We will reevaluate this with your new bone density. I hope this information proves useful to you.   I look forward to seeing you at your next scheduled appointment time. If you have any questions, please feel free to contact the office. Due to patient security requirements, we now offer a secure portal called [facility], where the report for this visit has been sent. You can download your information to a flash drive or other mobile device if necessary from the portal.  If you do not have a secure portal account and do not desire to sign up for one, you can bring in a flash drive from previous years and we will download the information for you. Sincerely, Teryl Baumgarten, MD

## 2017-09-01 ENCOUNTER — HOSPITAL ENCOUNTER (OUTPATIENT)
Dept: MAMMOGRAPHY | Age: 82
Discharge: HOME | End: 2017-09-01
Attending: INTERNAL MEDICINE
Payer: MEDICARE

## 2017-09-01 DIAGNOSIS — Z12.31 VISIT FOR SCREENING MAMMOGRAM: ICD-10-CM

## 2017-09-01 PROCEDURE — 77067 SCR MAMMO BI INCL CAD: CPT

## 2017-09-01 NOTE — PROGRESS NOTES
Pt called, ID verified. Advised pt of lab results per Dr. Avtar Melendrez. Pt verbalized understanding.

## 2017-09-19 ENCOUNTER — TELEPHONE (OUTPATIENT)
Dept: INTERNAL MEDICINE CLINIC | Age: 82
End: 2017-09-19

## 2017-09-19 DIAGNOSIS — M25.561 RIGHT KNEE PAIN, UNSPECIFIED CHRONICITY: Primary | ICD-10-CM

## 2017-09-19 DIAGNOSIS — M25.561 PAIN IN BOTH KNEES, UNSPECIFIED CHRONICITY: ICD-10-CM

## 2017-09-19 DIAGNOSIS — M25.562 PAIN IN BOTH KNEES, UNSPECIFIED CHRONICITY: ICD-10-CM

## 2017-10-25 ENCOUNTER — OFFICE VISIT (OUTPATIENT)
Dept: INTERNAL MEDICINE CLINIC | Age: 82
End: 2017-10-25

## 2017-10-25 VITALS
BODY MASS INDEX: 22.49 KG/M2 | WEIGHT: 135 LBS | HEART RATE: 68 BPM | OXYGEN SATURATION: 97 % | DIASTOLIC BLOOD PRESSURE: 76 MMHG | SYSTOLIC BLOOD PRESSURE: 136 MMHG | HEIGHT: 65 IN

## 2017-10-25 DIAGNOSIS — G31.84 MILD COGNITIVE IMPAIRMENT WITH MEMORY LOSS: ICD-10-CM

## 2017-10-25 DIAGNOSIS — Z23 ENCOUNTER FOR IMMUNIZATION: ICD-10-CM

## 2017-10-25 DIAGNOSIS — I10 ESSENTIAL HYPERTENSION: ICD-10-CM

## 2017-10-25 DIAGNOSIS — R60.0 LEG EDEMA, RIGHT: Primary | ICD-10-CM

## 2017-10-25 NOTE — PROGRESS NOTES
Reviewed record in preparation for visit and have obtained necessary documentation. Identified pt with two pt identifiers(name and ). Chief Complaint   Patient presents with    Leg Pain     both legs        Coordination of Care Questionnaire:  :     1) Have you been to an emergency room, urgent care clinic since your last visit? No    Hospitalized since your last visit? No              2) Have you seen or consulted any other health care providers outside of [address] [place] [address] since your last visit? Yes, saw ortho  - Dr Nicolasa Cerda. Taryn Fang who present for routine immunization, flu shot. She denies any symptoms , reactions or allergies that would exclude them from being immunized today. Risks and adverse reactions were discussed and the VIS was given to them. All questions were addressed. She was observed for 15 min post injection. There were no reactions observed.     Sera Rubalcava LPN

## 2017-10-25 NOTE — PATIENT INSTRUCTIONS
Vaccine Information Statement    Influenza (Flu) Vaccine (Inactivated or Recombinant): What you need to know    Many Vaccine Information Statements are available in Yoruba and other languages. See www.immunize.org/vis  Hojas de Información Sobre Vacunas están disponibles en Español y en muchos otros idiomas. Visite www.immunize.org/vis    1. Why get vaccinated? Influenza (flu) is a contagious disease that spreads around the [place] every year, usually between October and May. Flu is caused by influenza viruses, and is spread mainly by coughing, sneezing, and close contact. Anyone can get flu. Flu strikes suddenly and can last several days. Symptoms vary by age, but can include:   fever/chills   sore throat   muscle aches   fatigue   cough   headache    runny or stuffy nose    Flu can also lead to pneumonia and blood infections, and cause diarrhea and seizures in children. If you have a medical condition, such as heart or lung disease, flu can make it worse. Flu is more dangerous for some people. Infants and young children, people 72years of age and older, pregnant women, and people with certain health conditions or a weakened immune system are at greatest risk. Each year thousands of people in the [facility] die from flu, and many more are hospitalized. Flu vaccine can:   keep you from getting flu,   make flu less severe if you do get it, and   keep you from spreading flu to your family and other people. 2. Inactivated and recombinant flu vaccines    A dose of flu vaccine is recommended every flu season. Children 6 months through 6years of age may need two doses during the same flu season. Everyone else needs only one dose each flu season.        Some inactivated flu vaccines contain a very small amount of a mercury-based preservative called thimerosal. Studies have not shown thimerosal in vaccines to be harmful, but flu vaccines that do not contain thimerosal are available. There is no live flu virus in flu shots. They cannot cause the flu. There are many flu viruses, and they are always changing. Each year a new flu vaccine is made to protect against three or four viruses that are likely to cause disease in the upcoming flu season. But even when the vaccine doesnt exactly match these viruses, it may still provide some protection    Flu vaccine cannot prevent:   flu that is caused by a virus not covered by the vaccine, or   illnesses that look like flu but are not. It takes about 2 weeks for protection to develop after vaccination, and protection lasts through the flu season. 3. Some people should not get this vaccine    Tell the person who is giving you the vaccine:     If you have any severe, life-threatening allergies. If you ever had a life-threatening allergic reaction after a dose of flu vaccine, or have a severe allergy to any part of this vaccine, you may be advised not to get vaccinated. Most, but not all, types of flu vaccine contain a small amount of egg protein.  If you ever had Guillain-Barré Syndrome (also called GBS). Some people with a history of GBS should not get this vaccine. This should be discussed with your doctor.  If you are not feeling well. It is usually okay to get flu vaccine when you have a mild illness, but you might be asked to come back when you feel better. 4. Risks of a vaccine reaction    With any medicine, including vaccines, there is a chance of reactions. These are usually mild and go away on their own, but serious reactions are also possible. Most people who get a flu shot do not have any problems with it.      Minor problems following a flu shot include:    soreness, redness, or swelling where the shot was given     hoarseness   sore, red or itchy eyes   cough   fever   aches   headache   itching   fatigue  If these problems occur, they usually begin soon after the shot and last 1 or 2 days. More serious problems following a flu shot can include the following:     There may be a small increased risk of Guillain-Barré Syndrome (GBS) after inactivated flu vaccine. This risk has been estimated at 1 or 2 additional cases per million people vaccinated. This is much lower than the risk of severe complications from flu, which can be prevented by flu vaccine.  Young children who get the flu shot along with pneumococcal vaccine (PCV13) and/or DTaP vaccine at the same time might be slightly more likely to have a seizure caused by fever. Ask your doctor for more information. Tell your doctor if a child who is getting flu vaccine has ever had a seizure. Problems that could happen after any injected vaccine:      People sometimes faint after a medical procedure, including vaccination. Sitting or lying down for about 15 minutes can help prevent fainting, and injuries caused by a fall. Tell your doctor if you feel dizzy, or have vision changes or ringing in the ears.  Some people get severe pain in the shoulder and have difficulty moving the arm where a shot was given. This happens very rarely.  Any medication can cause a severe allergic reaction. Such reactions from a vaccine are very rare, estimated at about 1 in a million doses, and would happen within a few minutes to a few hours after the vaccination. As with any medicine, there is a very remote chance of a vaccine causing a serious injury or death. The safety of vaccines is always being monitored. For more information, visit: www.cdc.gov/vaccinesafety/    5. What if there is a serious reaction? What should I look for?  Look for anything that concerns you, such as signs of a severe allergic reaction, very high fever, or unusual behavior.     Signs of a severe allergic reaction can include hives, swelling of the face and throat, difficulty breathing, a fast heartbeat, dizziness, and weakness - usually within a few minutes to a few hours after the vaccination. What should I do?  If you think it is a severe allergic reaction or other emergency that cant wait, call 9-1-1 and get the person to the nearest hospital. Otherwise, call your doctor.  Reactions should be reported to the Vaccine Adverse Event Reporting System (VAERS). Your doctor should file this report, or you can do it yourself through  the VAERS web site at www.vaers. [facility].gov, or by calling 8-394.338.7264. VAERS does not give medical advice. 6. The National Vaccine Injury Compensation Program    The [facility] Vaccine Injury Compensation Program (VICP) is a federal program that was created to compensate people who may have been injured by certain vaccines. Persons who believe they may have been injured by a vaccine can learn about the program and about filing a claim by calling 1-807.818.7235 or visiting the [employer] website at www.[facility].gov/vaccinecompensation. There is a time limit to file a claim for compensation. 7. How can I learn more?  Ask your healthcare provider. He or she can give you the vaccine package insert or suggest other sources of information.  Call your local or state health department.  Contact the Centers for Disease Control and Prevention (CDC):  - Call 8-174.205.5023 (1-800-CDC-INFO) or  - Visit CDCs website at www.cdc.gov/flu    Vaccine Information Statement   Inactivated Influenza Vaccine   8/7/2015  [address] BREANNA Johannydra Chávez [id number]    Department of Health and Human Services  Centers for Disease Control and Prevention    Office Use Only

## 2017-10-25 NOTE — PROGRESS NOTES
Subjective:   Guilherme Portillo is a 80 y.o. female      Chief Complaint   Patient presents with    Leg Pain     both legs        History of present illness:  Ms. Meliton Gomez comes to the office today originally to get a flu shot, but then decided she wanted to be seen . She has been having some increasing difficulties with right leg swelling, as well as knee pain. Her last visit here for her comprehensive examination she was complaining of knee pain after a fall and also some sensations of give-way weakness. Xrays were negative and she was seen by Dr. Yasir Nash, who prescribed what sounds like Diclofenac gel. When this didn't help she called back and they suggested some physical therapy. I'm not sure that she doesn't have an internal derangement. Given the swelling of her right leg intermittently, I'd like to make sure she doesn't have a DVT first, however. I'm going to refer her for venous doppler. In addition, her other concern is that she feels she's not thinking as clearly as she has in the past.  We've seen some mild cognitive impairment on her for some time now, but she thinks it's worse and she thinks it is related to the Toprol XL. The only way I can determine if that is the case is to have her stop the Toprol XL, which is a very low dose anyway, and see how she does over the next month's time. Therefore will see her back in a month to evaluate this problem. Will call about the results of the venous doppler. If that is negative we'll have to make a determination as to whether she goes back to see orthopedics and be considered for an MRI or something similar. Flu vaccination was given today as well.         Patient Active Problem List    Diagnosis Date Noted    Adult onset hypothyroidism 08/15/2017     Priority: 1 - One    Dyslipidemia 08/15/2017     Priority: 1 - One    Hypertension 08/15/2017     Priority: 1 - One    Anxiety 08/15/2017     Priority: 2 - Two    Mild cognitive impairment with memory loss 08/15/2017     Priority: 2 - Two    DJD (degenerative joint disease) 08/29/2017     Priority: 3 - Three    Abdominal bloating 08/15/2017     Priority: 3 - Three    ASHD (arteriosclerotic heart disease) 08/15/2017     Priority: 3 - Three    History of TIA (transient ischemic attack) 08/15/2017     Priority: 3 - Three    Near syncope 08/15/2017     Priority: 3 - Three    ARMD (age related macular degeneration) 08/15/2017     Priority: 4 - Four    Frequent UTI 08/15/2017     Priority: 4 - Four    Loss of hair 08/15/2017     Priority: 4 - Four    Urinary incontinence 08/15/2017     Priority: 4 - Four    Villous adenoma 08/15/2017     Priority: 4 - Four    Family history of colon cancer 08/15/2017     Priority: 5 - Five    H/O basal cell carcinoma excision 08/15/2017     Priority: 5 - Five    Herpes zoster without complication 84/47/7832     Priority: 5 - Five    [place] (hard of hearing) 08/15/2017     Priority: 5 - Five    Leg cramps 08/15/2017     Priority: 5 - Five    Medial meniscus tear 08/15/2017     Priority: 5 - Five    Osteoporosis 08/15/2017     Priority: 5 - Five    Vitamin D deficiency 08/15/2017     Priority: 5 - Five      Past Surgical History:   Procedure Laterality Date    ABDOMEN SURGERY PROC UNLISTED      colon resection    HX CATARACT REMOVAL Bilateral     HX HEENT      partial thyroidectomy    HX HEENT  02/02/2017    redo neck exploration w/completion thyroidectomy    HX OTHER SURGICAL      skin cancer removed    HX TONSILLECTOMY      HX UROLOGICAL      Contigen injections    [facility] COLONOSCOPY FLX DX W/COLLJ SPEC WHEN PFRMD  3/13/2013           Allergies   Allergen Reactions    Statins-Hmg-Coa Reductase Inhibitors Unknown (comments)      Family History   Problem Relation Age of Onset    Cancer Mother      stomach    Dementia Mother     Hypertension Mother     Hypertension Father     Cancer Sister      Colon Cancer    Heart Disease Brother     Diabetes Brother  Cancer Brother      Colon Cancer    Heart Disease Son       Social History     Social History    Marital status:      Spouse name: N/A    Number of children: N/A    Years of education: N/A     Occupational History    Not on file. Social History Main Topics    Smoking status: Never Smoker    Smokeless tobacco: Never Used    Alcohol use No    Drug use: No    Sexual activity: Not on file     Other Topics Concern    Not on file     Social History Narrative     Outpatient Prescriptions Marked as Taking for the 10/25/17 encounter (Office Visit) with Mickey Lama MD   Medication Sig Dispense Refill    amLODIPine (NORVASC) 2.5 mg tablet Take 2.5 mg by mouth daily.  metoprolol succinate (TOPROL-XL) 25 mg XL tablet Take 12.5 mg by mouth daily.  levothyroxine (SYNTHROID) 75 mcg tablet Take 75 mcg by mouth five (5) days a week. M->F      omeprazole (PRILOSEC) 40 mg capsule Take 40 mg by mouth daily.  VIT C/E/ZN/COPPR/LUTEIN/ZEAXAN (PRESERVISION AREDS 2 PO) Take 1 Tab by mouth two (2) times a day.  carboxymethylcellulose sodium (THERATEARS) 0.25 % drop Apply  to eye.  CHOLECALCIFEROL, VITAMIN D3, (VITAMIN D3 PO) Take 1,000 Units by mouth daily.  levothyroxine (SYNTHROID) 50 mcg tablet Take 50 mcg by mouth two (2) times a week. Sat/Sun      aspirin delayed-release 81 mg tablet Take 81 mg by mouth daily.  cyanocobalamin (VITAMIN B-12) 500 mcg tablet Take 1,000 mcg by mouth daily. Review of Systems              Constitutional:  She denies fever, weight loss, sweats or fatigue. HEENT:  No blurred or double vision, headache or dizziness. No difficulty with swallowing, taste, speech or smell. Respiratory:  No cough, wheezing or shortness of breath. No sputum production. Cardiac:  Denies chest pain, palpitations, unexplained indigestion, syncope, edema, PND or orthopnea.     GI:  No changes in bowel movements, no abdominal pain, no bloating, anorexia, nausea, vomiting or heartburn. :  No frequency or dysuria. Denies incontinence. Extremities:  No joint pain, stiffness or swelling. Skin:  No recent rashes or mole changes. Neurological:  No numbness, tingling, burning paresthesias or loss of motor strength. No syncope, dizziness, frequent headaches or memory loss. Objective:     Vitals:    10/25/17 1518   BP: 136/76   Pulse: 68   SpO2: 97%   Weight: 135 lb (61.2 kg)   Height: 5' 5\" (1.651 m)       Body mass index is 22.47 kg/(m^2). Physical Examination:              General Appearance:  Well-developed, well-nourished, no acute  distress. HEENT:      Ears:  The TMs and ear canals were clear. Eyes:  The pupillary responses were normal.  Extraocular muscle function intact. Lids and conjunctiva not injected. Neck:  Supple without thyromegaly or adenopathy. No JVD noted. Lungs:  Clear to auscultation and percussion. Cardiac:  Regular rate and rhythm without murmur. GI: nontender w/o mass. Normal BS's. Extremities:  No clubbing, cyanosis or edema. Skin:  No rash or unusual mole changes noted. Neurological:  Grossly normal.            Assessment/Plan:   Impressions:      ICD-10-CM ICD-9-CM    1. Leg edema, right R60.0 782.3 DUPLEX LOWER EXT VENOUS RIGHT   2. Encounter for immunization Z23 V03.89 INFLUENZA VIRUS VACCINE, HIGH DOSE SEASONAL, PRESERVATIVE FREE      ADMIN INFLUENZA VIRUS VAC   3. Essential hypertension I10 401.9    4. Mild cognitive impairment with memory loss G31.84 331.83      780.93         Plan:  1. Continue present meds  2. Lifestyle modifications including Na restriction, low carb/fat diet, weight reduction and exercise (at least a walking program). Follow-up Disposition:  Return in about 4 weeks (around 11/22/2017) for As scheduled.       Orders Placed This Encounter    DUPLEX LOWER EXT VENOUS RIGHT     Standing Status:   Future     Standing Expiration Date:   11/25/2018    Influenza virus vaccine (Edgard 80) 72 years and older (43283)   Sanat 68 fee () for Medicare insured patients       Kaden Rajan MD

## 2017-11-10 ENCOUNTER — HOSPITAL ENCOUNTER (OUTPATIENT)
Dept: VASCULAR SURGERY | Age: 82
Discharge: HOME | End: 2017-11-10
Attending: INTERNAL MEDICINE
Payer: MEDICARE

## 2017-11-10 DIAGNOSIS — R60.0 LEG EDEMA, RIGHT: ICD-10-CM

## 2017-11-10 PROCEDURE — 93971 EXTREMITY STUDY: CPT

## 2017-11-10 NOTE — PROCEDURES
[facility] [place]  *** FINAL REPORT ***    Name: Sherin Carbone  MRN: [id number]    Outpatient  : [id number]  HIS Order #: [id number]  [address] [facility] Visit #: [id number]  Date: 10 Nov 2017    TYPE OF TEST: Peripheral Venous Testing    REASON FOR TEST  Pain in limb, Limb swelling    Right Leg:-  Deep venous thrombosis:           No  Superficial venous thrombosis:    No  Deep venous insufficiency:        No  Superficial venous insufficiency: No      INTERPRETATION/FINDINGS  PROCEDURE:  Venous duplex examination using B-mode, color flow and  spectral Doppler of the lower extremity veins. Right leg :  1. Deep vein(s) visualized include the common femoral, proximal  femoral, mid femoral, distal femoral, popliteal(above knee),  popliteal(fossa), popliteal(below knee), posterior tibial and peroneal   veins. 2. No evidence of deep venous thrombosis detected in the veins  visualized. 3. No evidence of deep vein thrombosis in the contralateral common  femoral vein. 4. Superficial vein(s) visualized include the great saphenous vein. 5. No evidence of superficial thrombosis detected. 6. No evidence of reflux detected in the deep veins visualized. 7. No evidence of reflux detected in the superficial veins visualized. ADDITIONAL COMMENTS    I have personally reviewed the data relevant to the interpretation of  this  study.     TECHNOLOGIST: Maicol Thompson RVT  Signed: 11/10/2017 01:52 PM    PHYSICIAN: Zachary Kelley MD  Signed: 2017 01:26 PM

## 2017-11-10 NOTE — PROGRESS NOTES
[id number] [place] [facility]  Preliminary Report:  Venous Duplex Leg    Right leg venous duplex was performed. All deep and superficial veins appear compressible with normal Doppler characteristics. Final report to follow.

## 2017-11-29 PROBLEM — Z00.00 PE (PHYSICAL EXAM), ANNUAL: Status: ACTIVE | Noted: 2017-11-29

## 2017-11-30 ENCOUNTER — OFFICE VISIT (OUTPATIENT)
Dept: INTERNAL MEDICINE CLINIC | Age: 82
End: 2017-11-30

## 2017-11-30 VITALS
HEIGHT: 65 IN | OXYGEN SATURATION: 96 % | WEIGHT: 136 LBS | BODY MASS INDEX: 22.66 KG/M2 | HEART RATE: 88 BPM | SYSTOLIC BLOOD PRESSURE: 140 MMHG | DIASTOLIC BLOOD PRESSURE: 72 MMHG

## 2017-11-30 DIAGNOSIS — N39.0 URINARY TRACT INFECTION WITHOUT HEMATURIA, SITE UNSPECIFIED: ICD-10-CM

## 2017-11-30 DIAGNOSIS — J32.9 SINUSITIS, UNSPECIFIED CHRONICITY, UNSPECIFIED LOCATION: ICD-10-CM

## 2017-11-30 DIAGNOSIS — I10 ESSENTIAL HYPERTENSION: Primary | ICD-10-CM

## 2017-11-30 LAB
BACTERIA UA POCT, BACTPOCT: NORMAL
BILIRUB UR QL STRIP: NEGATIVE
CASTS UA POCT: 0
CLUE CELLS, CLUEPOCT: NEGATIVE
CRYSTALS UA POCT, CRYSPOCT: NEGATIVE
EPITHELIAL CELLS POCT: NORMAL
GLUCOSE UR-MCNC: NEGATIVE MG/DL
KETONES P FAST UR STRIP-MCNC: NEGATIVE MG/DL
MUCUS UA POCT, MUCPOCT: NORMAL
PH UR STRIP: 6 [PH] (ref 5–7)
PROT UR QL STRIP: NEGATIVE
RBC UA POCT, RBCPOCT: 0
SP GR UR STRIP: 1.01 (ref 1.01–1.02)
TRICH UA POCT, TRICHPOC: NEGATIVE
UA UROBILINOGEN AMB POC: NORMAL (ref 0.2–1)
URINALYSIS CLARITY POC: CLEAR
URINALYSIS COLOR POC: NORMAL
URINE BLOOD POC: NEGATIVE
URINE CULT COMMENT, POCT: NORMAL
URINE LEUKOCYTES POC: NEGATIVE
URINE NITRITES POC: NEGATIVE
WBC UA POCT, WBCPOCT: NORMAL
YEAST UA POCT, YEASTPOC: NEGATIVE

## 2017-11-30 RX ORDER — CEFDINIR 300 MG/1
300 CAPSULE ORAL 2 TIMES DAILY
Qty: 14 CAP | Refills: 0 | Status: SHIPPED | OUTPATIENT
Start: 2017-11-30 | End: 2018-02-01

## 2017-11-30 NOTE — PROGRESS NOTES
Subjective:   Ferdinand Davis is a 80 y.o. female      Chief Complaint   Patient presents with    Follow-up     3 mo        History of present illness:  Ms. Sheryl Dozier returns in follow up. Her last visit here she was complaining about not thinking as clearly as she normally does and was concerned it was related to Metoprolol. We stopped that. She feels she may be somewhat better, so she doesn't want to resume it. Her blood pressure is just at the borderline of normal at 140/70 and will have to keep careful watch on that. I think we are fine at this point and can remain off the Metoprolol for now. We did review her other medications and they are appropriate. A couple of days ago she had some burning of urination. It has been less prominent today, but she did bring in a urine to be checked. Also five to six days ago she began noting problems with nasal congestion, postnasal drainage and a cough. The cough is now productive of purulent appearing sputum. She has a fair amount of nasal congestion. The cough can keep her awake at night. She's had no fever or chills. She denies other cardiorespiratory, GI or  symptoms. At this point we'll treat her sinusitis and cover her with something which is likely to cover her for a UTI as well. We will check her urine culture today. She is up to date on labs and can follow up in February for her six month follow up fasting with lipids, blood pressure check, etc.  Will give her a call about the urine culture and make adjustments from there.         Patient Active Problem List    Diagnosis Date Noted    Adult onset hypothyroidism 08/15/2017     Priority: 1 - One    Dyslipidemia 08/15/2017     Priority: 1 - One    Hypertension 08/15/2017     Priority: 1 - One    Anxiety 08/15/2017     Priority: 2 - Two    Mild cognitive impairment with memory loss 08/15/2017     Priority: 2 - Two    DJD (degenerative joint disease) 08/29/2017     Priority: 3 - Three    Abdominal bloating 08/15/2017     Priority: 3 - Three    ASHD (arteriosclerotic heart disease) 08/15/2017     Priority: 3 - Three    History of TIA (transient ischemic attack) 08/15/2017     Priority: 3 - Three    Near syncope 08/15/2017     Priority: 3 - Three    ARMD (age related macular degeneration) 08/15/2017     Priority: 4 - Four    Frequent UTI 08/15/2017     Priority: 4 - Four    Loss of hair 08/15/2017     Priority: 4 - Four    Urinary incontinence 08/15/2017     Priority: 4 - Four    Villous adenoma 08/15/2017     Priority: 4 - Four    Family history of colon cancer 08/15/2017     Priority: 5 - Five    H/O basal cell carcinoma excision 08/15/2017     Priority: 5 - Five    Herpes zoster without complication 69/66/4639     Priority: 5 - Five    [place] (hard of hearing) 08/15/2017     Priority: 5 - Five    Leg cramps 08/15/2017     Priority: 5 - Five    Medial meniscus tear 08/15/2017     Priority: 5 - Five    Osteoporosis 08/15/2017     Priority: 5 - Five    Vitamin D deficiency 08/15/2017     Priority: 5 - Five    PE (physical exam), annual 11/29/2017      Past Surgical History:   Procedure Laterality Date    ABDOMEN SURGERY PROC UNLISTED      colon resection    HX CATARACT REMOVAL Bilateral     HX HEENT      partial thyroidectomy    HX HEENT  02/02/2017    redo neck exploration w/completion thyroidectomy    HX OTHER SURGICAL      skin cancer removed    HX TONSILLECTOMY      HX UROLOGICAL      Contigen injections    SC COLONOSCOPY FLX DX W/COLLJ SPEC WHEN PFRMD  3/13/2013           Allergies   Allergen Reactions    Statins-Hmg-Coa Reductase Inhibitors Unknown (comments)      Family History   Problem Relation Age of Onset    Cancer Mother      stomach    Dementia Mother     Hypertension Mother     Hypertension Father     Cancer Sister      Colon Cancer    Heart Disease Brother     Diabetes Brother     Cancer Brother      Colon Cancer    Heart Disease Son       Social History     Social History    Marital status:      Spouse name: N/A    Number of children: N/A    Years of education: N/A     Occupational History    Not on file. Social History Main Topics    Smoking status: Never Smoker    Smokeless tobacco: Never Used    Alcohol use No    Drug use: No    Sexual activity: Not on file     Other Topics Concern    Not on file     Social History Narrative     Outpatient Prescriptions Marked as Taking for the 11/30/17 encounter (Office Visit) with Chelsea Allen MD   Medication Sig Dispense Refill    cefdinir (OMNICEF) 300 mg capsule Take 1 Cap by mouth two (2) times a day. 14 Cap 0    amLODIPine (NORVASC) 2.5 mg tablet Take 2.5 mg by mouth daily.  levothyroxine (SYNTHROID) 75 mcg tablet Take 75 mcg by mouth five (5) days a week. M->F      VIT C/E/ZN/COPPR/LUTEIN/ZEAXAN (PRESERVISION AREDS 2 PO) Take 1 Tab by mouth two (2) times a day.  carboxymethylcellulose sodium (THERATEARS) 0.25 % drop Apply  to eye.  CHOLECALCIFEROL, VITAMIN D3, (VITAMIN D3 PO) Take 1,000 Units by mouth daily.  levothyroxine (SYNTHROID) 50 mcg tablet Take 50 mcg by mouth two (2) times a week. Sat/Sun      aspirin delayed-release 81 mg tablet Take 81 mg by mouth daily.  cyanocobalamin (VITAMIN B-12) 500 mcg tablet Take 1,000 mcg by mouth daily. Review of Systems              Constitutional:  She denies fever, weight loss, sweats or fatigue. HEENT:  No blurred or double vision, headache or dizziness. No difficulty with swallowing, taste, speech or smell. Respiratory:  No cough, wheezing or shortness of breath. No sputum production. Cardiac:  Denies chest pain, palpitations, unexplained indigestion, syncope, edema, PND or orthopnea. GI:  No changes in bowel movements, no abdominal pain, no bloating, anorexia, nausea, vomiting or heartburn. :  No frequency or dysuria. Denies incontinence. Extremities:  No joint pain, stiffness or swelling.   Skin:  No recent rashes or mole changes. Neurological:  No numbness, tingling, burning paresthesias or loss of motor strength. No syncope, dizziness, frequent headaches or memory loss. Objective:     Vitals:    11/30/17 1309   BP: 140/72   Pulse: 88   SpO2: 96%   Weight: 136 lb (61.7 kg)   Height: 5' 5\" (1.651 m)       Body mass index is 22.63 kg/(m^2). Physical Examination:              General Appearance:  Well-developed, well-nourished, no acute  distress. HEENT:      Ears:  The TMs and ear canals were clear. Eyes:  The pupillary responses were normal.  Extraocular muscle function intact. Lids and conjunctiva not injected. Neck:  Supple without thyromegaly or adenopathy. No JVD noted. Lungs:  Clear to auscultation and percussion. Cardiac:  Regular rate and rhythm without murmur. GI: nontender w/o mass. Normal BS's. Extremities:  No clubbing, cyanosis or edema. Skin:  No rash or unusual mole changes noted. Neurological:  Grossly normal.            Assessment/Plan:   Impressions:      ICD-10-CM ICD-9-CM    1. Essential hypertension I10 401.9 AMB POC URINALYSIS DIP STICK AUTO W/ MICRO    2. Urinary tract infection without hematuria, site unspecified N39.0 599.0 AMB POC URINALYSIS DIP STICK AUTO W/ MICRO       CULTURE, URINE   3. Sinusitis, unspecified chronicity, unspecified location J32.9 473.9 cefdinir (OMNICEF) 300 mg capsule        Plan:  1. Continue present meds  2. Lifestyle modifications including Na restriction, low carb/fat diet, weight reduction and exercise (at least a walking program). 3. OK to stay off metoprolol        Follow-up Disposition:  Return in about 3 months (around 2/28/2018) for Fasting, BP check, lipids. Orders Placed This Encounter    CULTURE, URINE    AMB POC URINALYSIS DIP STICK AUTO W/ MICRO     cefdinir (OMNICEF) 300 mg capsule     Sig: Take 1 Cap by mouth two (2) times a day.      Dispense:  14 Cap     Refill:  0       Rosalva Tomlinson MD

## 2017-12-02 LAB — BACTERIA UR CULT: NORMAL

## 2017-12-04 ENCOUNTER — LAB ONLY (OUTPATIENT)
Dept: INTERNAL MEDICINE CLINIC | Age: 82
End: 2017-12-04

## 2017-12-04 DIAGNOSIS — R35.0 FREQUENCY OF URINATION: Primary | ICD-10-CM

## 2017-12-04 LAB
BACTERIA UA POCT, BACTPOCT: NORMAL
BILIRUB UR QL STRIP: NEGATIVE
CASTS UA POCT: NORMAL
CLUE CELLS, CLUEPOCT: NEGATIVE
CRYSTALS UA POCT, CRYSPOCT: NEGATIVE
EPITHELIAL CELLS POCT: NORMAL
GLUCOSE UR-MCNC: NEGATIVE MG/DL
KETONES P FAST UR STRIP-MCNC: NEGATIVE MG/DL
MUCUS UA POCT, MUCPOCT: NORMAL
PH UR STRIP: 6.5 [PH] (ref 5–7)
PROT UR QL STRIP: NEGATIVE
RBC UA POCT, RBCPOCT: NORMAL
SP GR UR STRIP: 1.01 (ref 1.01–1.02)
TRICH UA POCT, TRICHPOC: NEGATIVE
UA UROBILINOGEN AMB POC: NORMAL (ref 0.2–1)
URINALYSIS CLARITY POC: CLEAR
URINALYSIS COLOR POC: NORMAL
URINE BLOOD POC: NEGATIVE
URINE CULT COMMENT, POCT: NORMAL
URINE LEUKOCYTES POC: NEGATIVE
URINE NITRITES POC: NEGATIVE
WBC UA POCT, WBCPOCT: 0
YEAST UA POCT, YEASTPOC: NEGATIVE

## 2017-12-06 LAB — BACTERIA UR CULT: NO GROWTH

## 2017-12-26 RX ORDER — AMLODIPINE BESYLATE 2.5 MG/1
TABLET ORAL
Qty: 90 TAB | Refills: 4 | Status: SHIPPED | OUTPATIENT
Start: 2017-12-26 | End: 2018-02-19

## 2018-02-01 ENCOUNTER — OFFICE VISIT (OUTPATIENT)
Dept: INTERNAL MEDICINE CLINIC | Age: 83
End: 2018-02-01

## 2018-02-01 VITALS
TEMPERATURE: 98.1 F | HEART RATE: 76 BPM | DIASTOLIC BLOOD PRESSURE: 80 MMHG | OXYGEN SATURATION: 97 % | SYSTOLIC BLOOD PRESSURE: 138 MMHG | BODY MASS INDEX: 22.06 KG/M2 | WEIGHT: 132.4 LBS | HEIGHT: 65 IN

## 2018-02-01 DIAGNOSIS — R10.84 GENERALIZED ABDOMINAL PAIN: Primary | ICD-10-CM

## 2018-02-01 DIAGNOSIS — R19.4 CHANGE IN BOWEL HABITS: ICD-10-CM

## 2018-02-01 LAB
ALBUMIN SERPL-MCNC: 4.6 G/DL (ref 3.9–5.4)
ALKALINE PHOS POC: 58 U/L (ref 38–126)
ALT SERPL-CCNC: 20 U/L (ref 9–52)
AST SERPL-CCNC: 27 U/L (ref 14–36)
BUN BLD-MCNC: 12 MG/DL (ref 7–17)
CALCIUM BLD-MCNC: 9.6 MG/DL (ref 8.4–10.2)
CHLORIDE BLD-SCNC: 98 MMOL/L (ref 98–107)
CO2 POC: 30 MMOL/L (ref 22–32)
CREAT BLD-MCNC: 0.7 MG/DL (ref 0.7–1.2)
EGFR (POC): 77.4
GLUCOSE POC: 83 MG/DL (ref 65–105)
GRAN# POC: 4.3 K/UL (ref 2–7.8)
GRAN% POC: 64.1 % (ref 37–92)
HCT VFR BLD CALC: 42.5 % (ref 37–51)
HGB BLD-MCNC: 14.2 G/DL (ref 12–18)
LY# POC: 2 K/UL (ref 0.6–4.1)
LY% POC: 31.8 % (ref 10–58.5)
MCH RBC QN: 29.7 PG (ref 26–32)
MCHC RBC-ENTMCNC: 33.5 G/DL (ref 30–36)
MCV RBC: 89 FL (ref 80–97)
MID #, POC: 0.2 K/UL (ref 0–1.8)
MID% POC: 4.1 % (ref 0.1–24)
PLATELET # BLD: 241 K/UL (ref 140–440)
POTASSIUM SERPL-SCNC: 5.3 MMOL/L (ref 3.6–5)
PROT SERPL-MCNC: 7.7 G/DL (ref 6.3–8.2)
RBC # BLD: 4.79 M/UL (ref 4.2–6.3)
SODIUM SERPL-SCNC: 136 MMOL/L (ref 137–145)
TOTAL BILIRUBIN POC: 0.8 MG/DL (ref 0.2–1.3)
WBC # BLD: 6.5 K/UL (ref 4.1–10.9)

## 2018-02-01 RX ORDER — FAMOTIDINE 20 MG/1
20 TABLET, FILM COATED ORAL 2 TIMES DAILY
Qty: 30 TAB | Refills: 1 | Status: SHIPPED | OUTPATIENT
Start: 2018-02-01 | End: 2018-03-01

## 2018-02-01 NOTE — PROGRESS NOTES
Reviewed record in preparation for visit and have obtained necessary documentation. Identified pt with two pt identifiers(name and ). Chief Complaint   Patient presents with    Abdominal Pain     and bloating, after meals x 2-3 days    Fatigue     x 1 wk, getting better        Coordination of Care Questionnaire:  :     1) Have you been to an emergency room, urgent care clinic since your last visit? No    Hospitalized since your last visit? No              2) Have you seen or consulted any other health care providers outside of [address] [place] [address] since your last visit?  No

## 2018-02-01 NOTE — PROGRESS NOTES
Subjective:   Caleb Vallecillo is a 80 y.o. female      Chief Complaint   Patient presents with    Abdominal Pain     and bloating, after meals x 2-3 days    Fatigue     x 1 wk, getting better        History of present illness: Ms. Pepe Salinas comes to the office today with a variety of vague complaints. Sorted out in all this is that she's had some abdominal pain, abdominal bloating and change in her bowel habits with a tendency towards constipation. She's had no fever, chills, melena or hematochezia. She's had no recent other viral sounding illness. She does note that her symptoms seemed to start at the time when she seemed to over-exercise at the gym and felt poorly after that. She continued to feel poorly for a couple days, but has begun to note she's feeling better in that regard since, but still has the abdominal complaints. She denies any current  symptoms. She is not taking anything to help her bowels. She has some fullness on examination of her abdomen and we'll check a CT scan of the abdomen and pelvis, as well as lab work, and see her back after that. In the meantime I will treat her nonspecifically with some Pepcid twice a day. She'll let us know if things worsen in the meantime.         Patient Active Problem List    Diagnosis Date Noted    Adult onset hypothyroidism 08/15/2017     Priority: 1 - One    Dyslipidemia 08/15/2017     Priority: 1 - One    Hypertension 08/15/2017     Priority: 1 - One    Anxiety 08/15/2017     Priority: 2 - Two    Mild cognitive impairment with memory loss 08/15/2017     Priority: 2 - Two    DJD (degenerative joint disease) 08/29/2017     Priority: 3 - Three    Abdominal bloating 08/15/2017     Priority: 3 - Three    ASHD (arteriosclerotic heart disease) 08/15/2017     Priority: 3 - Three    History of TIA (transient ischemic attack) 08/15/2017     Priority: 3 - Three    Near syncope 08/15/2017     Priority: 3 - Three    ARMD (age related macular degeneration) 08/15/2017     Priority: 4 - Four    Frequent UTI 08/15/2017     Priority: 4 - Four    Loss of hair 08/15/2017     Priority: 4 - Four    Urinary incontinence 08/15/2017     Priority: 4 - Four    Villous adenoma 08/15/2017     Priority: 4 - Four    Family history of colon cancer 08/15/2017     Priority: 5 - Five    H/O basal cell carcinoma excision 08/15/2017     Priority: 5 - Five    Herpes zoster without complication 46/10/1466     Priority: 5 - Five    [place] (hard of hearing) 08/15/2017     Priority: 5 - Five    Leg cramps 08/15/2017     Priority: 5 - Five    Medial meniscus tear 08/15/2017     Priority: 5 - Five    Osteoporosis 08/15/2017     Priority: 5 - Five    Vitamin D deficiency 08/15/2017     Priority: 5 - Five    PE (physical exam), annual 11/29/2017      Past Surgical History:   Procedure Laterality Date    ABDOMEN SURGERY PROC UNLISTED      colon resection    HX CATARACT REMOVAL Bilateral     HX HEENT      partial thyroidectomy    HX HEENT  02/02/2017    redo neck exploration w/completion thyroidectomy    HX OTHER SURGICAL      skin cancer removed    HX TONSILLECTOMY      HX UROLOGICAL      Contigen injections    [place] COLONOSCOPY FLX DX W/COLLJ SPEC WHEN PFRMD  3/13/2013           Allergies   Allergen Reactions    Statins-Hmg-Coa Reductase Inhibitors Unknown (comments)      Family History   Problem Relation Age of Onset    Cancer Mother      stomach    Dementia Mother     Hypertension Mother     Hypertension Father     Cancer Sister      Colon Cancer    Heart Disease Brother     Diabetes Brother     Cancer Brother      Colon Cancer    Heart Disease Son       Social History     Social History    Marital status:      Spouse name: N/A    Number of children: N/A    Years of education: N/A     Occupational History    Not on file.      Social History Main Topics    Smoking status: Never Smoker    Smokeless tobacco: Never Used    Alcohol use No    Drug use: No    Sexual activity: Not on file     Other Topics Concern    Not on file     Social History Narrative     Outpatient Prescriptions Marked as Taking for the 2/1/18 encounter (Office Visit) with Chema Cleveland MD   Medication Sig Dispense Refill    famotidine (PEPCID) 20 mg tablet Take 1 Tab by mouth two (2) times a day. 30 Tab 1    amLODIPine (NORVASC) 2.5 mg tablet TAKE ONE TABLET BY MOUTH ONCE DAILY 90 Tab 4    levothyroxine (SYNTHROID) 75 mcg tablet Take 75 mcg by mouth five (5) days a week. M->F      VIT C/E/ZN/COPPR/LUTEIN/ZEAXAN (PRESERVISION AREDS 2 PO) Take 1 Tab by mouth two (2) times a day.  carboxymethylcellulose sodium (THERATEARS) 0.25 % drop Apply  to eye.  CHOLECALCIFEROL, VITAMIN D3, (VITAMIN D3 PO) Take 1,000 Units by mouth daily.  levothyroxine (SYNTHROID) 50 mcg tablet Take 50 mcg by mouth two (2) times a week. Sat/Sun      aspirin delayed-release 81 mg tablet Take 81 mg by mouth daily.  cyanocobalamin (VITAMIN B-12) 500 mcg tablet Take 1,000 mcg by mouth daily. Review of Systems              Constitutional:  She denies fever, weight loss, sweats or fatigue. HEENT:  No blurred or double vision, headache or dizziness. No difficulty with swallowing, taste, speech or smell. Respiratory:  No cough, wheezing or shortness of breath. No sputum production. Cardiac:  Denies chest pain, palpitations, unexplained indigestion, syncope, edema, PND or orthopnea. GI: Changes in bowel movements,and abdominal pain and bloating, anorexia,. No nausea, vomiting or heartburn. :  No frequency or dysuria. Denies incontinence. Extremities:  No joint pain, stiffness or swelling. Skin:  No recent rashes or mole changes. Neurological:  No numbness, tingling, burning paresthesias or loss of motor strength. No syncope, dizziness, frequent headaches or memory loss.       Objective:     Vitals:    02/01/18 1404   BP: 138/80   Pulse: 76   Temp: 98.1 °F (36.7 °C) TempSrc: Oral   SpO2: 97%   Weight: 132 lb 6.4 oz (60.1 kg)   Height: 5' 5\" (1.651 m)       Body mass index is 22.03 kg/(m^2). Physical Examination:              General Appearance:  Well-developed, well-nourished, no acute  distress. HEENT:     Ears:  The TMs and ear canals were clear. Eyes:  The pupillary responses were normal.  Extraocular muscle function intact. Lids and conjunctiva not injected. Neck:  Supple without thyromegaly or adenopathy. No JVD noted. Lungs:  Clear to auscultation and percussion. Cardiac:  Regular rate and rhythm without murmur. GI: nontender w/o mass. Normal BS's. Extremities:  No clubbing, cyanosis or edema. Skin:  No rash or unusual mole changes noted. Neurological:  Grossly normal.            Assessment/Plan:   Impressions:      ICD-10-CM ICD-9-CM    1. Generalized abdominal pain R10.84 789.07 CT ABD PELV W WO CONT      AMB POC COMPLETE CBC,AUTOMATED ENTER      AMB POC COMPREHENSIVE METABOLIC PANEL      famotidine (PEPCID) 20 mg tablet   2. Change in bowel habits R19.4 787.99 CT ABD PELV W WO CONT      AMB POC COMPLETE CBC,AUTOMATED ENTER      AMB POC COMPREHENSIVE METABOLIC PANEL      famotidine (PEPCID) 20 mg tablet        Plan:  1. Continue present meds  2. Lifestyle modifications including Na restriction, low carb/fat diet and exercise (at least a walking program). 3. FU after CT        Follow-up Disposition:  Return in about 1 week (around 2/8/2018). Orders Placed This Encounter    CT ABD PELV W WO CONT     Standing Status:   Future     Standing Expiration Date:   3/1/2019     Order Specific Question:   Is Patient Allergic to Contrast Dye? Answer:   No     Order Specific Question:   STAT Creatinine as indicated     Answer:   Yes     Order Specific Question: This order utilizes IV contrast.  What additional contrast is needed?      Answer:   Oral    AMB POC COMPLETE CBC,AUTOMATED ENTER    AMB POC COMPREHENSIVE METABOLIC PANEL    famotidine (PEPCID) 20 mg tablet     Sig: Take 1 Tab by mouth two (2) times a day.      Dispense:  30 Tab     Refill:  1       Dionisio Cochran MD

## 2018-02-03 ENCOUNTER — HOSPITAL ENCOUNTER (OUTPATIENT)
Dept: CT IMAGING | Age: 83
Discharge: HOME | End: 2018-02-03
Attending: INTERNAL MEDICINE
Payer: MEDICARE

## 2018-02-03 DIAGNOSIS — R19.4 CHANGE IN BOWEL HABITS: ICD-10-CM

## 2018-02-03 DIAGNOSIS — R10.84 GENERALIZED ABDOMINAL PAIN: ICD-10-CM

## 2018-02-03 LAB — CREAT BLD-MCNC: 0.5 MG/DL (ref 0.6–1.3)

## 2018-02-03 PROCEDURE — 82565 ASSAY OF CREATININE: CPT

## 2018-02-03 PROCEDURE — 74177 CT ABD & PELVIS W/CONTRAST: CPT

## 2018-02-03 PROCEDURE — 74011000258 HC RX REV CODE- 258: Performed by: INTERNAL MEDICINE

## 2018-02-03 PROCEDURE — 74011636320 HC RX REV CODE- 636/320: Performed by: INTERNAL MEDICINE

## 2018-02-03 RX ORDER — SODIUM CHLORIDE 0.9 % (FLUSH) 0.9 %
10 SYRINGE (ML) INJECTION
Status: COMPLETED | OUTPATIENT
Start: 2018-02-03 | End: 2018-02-03

## 2018-02-03 RX ADMIN — SODIUM CHLORIDE 100 ML: 900 INJECTION, SOLUTION INTRAVENOUS at 10:19

## 2018-02-03 RX ADMIN — Medication 10 ML: at 10:19

## 2018-02-03 RX ADMIN — IOPAMIDOL 100 ML: 755 INJECTION, SOLUTION INTRAVENOUS at 10:19

## 2018-02-05 ENCOUNTER — OFFICE VISIT (OUTPATIENT)
Dept: INTERNAL MEDICINE CLINIC | Age: 83
End: 2018-02-05

## 2018-02-05 VITALS
RESPIRATION RATE: 18 BRPM | DIASTOLIC BLOOD PRESSURE: 82 MMHG | HEART RATE: 73 BPM | WEIGHT: 131 LBS | BODY MASS INDEX: 21.83 KG/M2 | TEMPERATURE: 98.2 F | HEIGHT: 65 IN | OXYGEN SATURATION: 98 % | SYSTOLIC BLOOD PRESSURE: 152 MMHG

## 2018-02-05 DIAGNOSIS — R14.0 ABDOMINAL BLOATING: Primary | ICD-10-CM

## 2018-02-05 DIAGNOSIS — R55 NEAR SYNCOPE: ICD-10-CM

## 2018-02-05 NOTE — PROGRESS NOTES
Subjective:   Ibis Ghotra is a 80 y.o. female      Chief Complaint   Patient presents with    Follow-up     discuss test results        History of present illness:  Ms. Bri Houser returns in follow up. Once again her symptoms are vague. She still notes some vague abdominal bloating, as well as sensation of not feeling right on occasion and feeling weaker. She's felt better since she's started to exercise a little bit more. I think most of her symptoms probably relate to nerves and her daughter in law who accompanies her today agrees with that as a possibility. I am reluctant to consider medications for that. She actually does better when she exercises on a regular basis and I've asked her to return to that. Bowel habits are about the same, though she may be closer to the constipated side now than she usually is and that should improve with exercise, etc.  We tried her on BuSpar in the past for her nerves and she did not tolerate that and I am reluctant to use a benzodiazepine at her age, but if forced would consider a low dose of Xanax. I think for now we will return her to her regular activities, diet and exercise slowly and see how she does over the next two weeks time before changing medications or adding medications. CBC, CMP and CT scan of the abdomen and pelvis were recently essentially normal.  I don't think too much is going on otherwise.         Patient Active Problem List    Diagnosis Date Noted    Adult onset hypothyroidism 08/15/2017     Priority: 1 - One    Dyslipidemia 08/15/2017     Priority: 1 - One    Hypertension 08/15/2017     Priority: 1 - One    Anxiety 08/15/2017     Priority: 2 - Two    Mild cognitive impairment with memory loss 08/15/2017     Priority: 2 - Two    DJD (degenerative joint disease) 08/29/2017     Priority: 3 - Three    Abdominal bloating 08/15/2017     Priority: 3 - Three    ASHD (arteriosclerotic heart disease) 08/15/2017     Priority: 3 - Three    History of TIA (transient ischemic attack) 08/15/2017     Priority: 3 - Three    Near syncope 08/15/2017     Priority: 3 - Three    ARMD (age related macular degeneration) 08/15/2017     Priority: 4 - Four    Frequent UTI 08/15/2017     Priority: 4 - Four    Loss of hair 08/15/2017     Priority: 4 - Four    Urinary incontinence 08/15/2017     Priority: 4 - Four    Villous adenoma 08/15/2017     Priority: 4 - Four    Family history of colon cancer 08/15/2017     Priority: 5 - Five    H/O basal cell carcinoma excision 08/15/2017     Priority: 5 - Five    Herpes zoster without complication 22/78/0786     Priority: 5 - Five    [place] (hard of hearing) 08/15/2017     Priority: 5 - Five    Leg cramps 08/15/2017     Priority: 5 - Five    Medial meniscus tear 08/15/2017     Priority: 5 - Five    Osteoporosis 08/15/2017     Priority: 5 - Five    Vitamin D deficiency 08/15/2017     Priority: 5 - Five    PE (physical exam), annual 11/29/2017      Past Surgical History:   Procedure Laterality Date    ABDOMEN SURGERY PROC UNLISTED      colon resection    HX CATARACT REMOVAL Bilateral     HX HEENT      partial thyroidectomy    HX HEENT  02/02/2017    redo neck exploration w/completion thyroidectomy    HX OTHER SURGICAL      skin cancer removed    HX TONSILLECTOMY      HX UROLOGICAL      Contigen injections    [place] COLONOSCOPY FLX DX W/COLLJ SPEC WHEN PFRMD  3/13/2013           Allergies   Allergen Reactions    Statins-Hmg-Coa Reductase Inhibitors Unknown (comments)      Family History   Problem Relation Age of Onset    Cancer Mother      stomach    Dementia Mother     Hypertension Mother     Hypertension Father     Cancer Sister      Colon Cancer    Heart Disease Brother     Diabetes Brother     Cancer Brother      Colon Cancer    Heart Disease Son       Social History     Social History    Marital status:      Spouse name: N/A    Number of children: N/A    Years of education: N/A     Occupational History  Not on file. Social History Main Topics    Smoking status: Never Smoker    Smokeless tobacco: Never Used    Alcohol use No    Drug use: No    Sexual activity: Not on file     Other Topics Concern    Not on file     Social History Narrative     Outpatient Prescriptions Marked as Taking for the 2/5/18 encounter (Office Visit) with Hernando Zhang MD   Medication Sig Dispense Refill    famotidine (PEPCID) 20 mg tablet Take 1 Tab by mouth two (2) times a day. 30 Tab 1    amLODIPine (NORVASC) 2.5 mg tablet TAKE ONE TABLET BY MOUTH ONCE DAILY 90 Tab 4    levothyroxine (SYNTHROID) 75 mcg tablet Take 75 mcg by mouth five (5) days a week. M->F      VIT C/E/ZN/COPPR/LUTEIN/ZEAXAN (PRESERVISION AREDS 2 PO) Take 1 Tab by mouth two (2) times a day.  carboxymethylcellulose sodium (THERATEARS) 0.25 % drop Apply  to eye.  CHOLECALCIFEROL, VITAMIN D3, (VITAMIN D3 PO) Take 1,000 Units by mouth daily.  levothyroxine (SYNTHROID) 50 mcg tablet Take 50 mcg by mouth two (2) times a week. Sat/Sun      aspirin delayed-release 81 mg tablet Take 81 mg by mouth daily.  cyanocobalamin (VITAMIN B-12) 500 mcg tablet Take 1,000 mcg by mouth daily. Review of Systems              Constitutional:  She denies fever, weight loss, sweats or fatigue. HEENT:  No blurred or double vision, headache or dizziness. No difficulty with swallowing, taste, speech or smell. Respiratory:  No cough, wheezing or shortness of breath. No sputum production. Cardiac:  Denies chest pain, palpitations, unexplained indigestion, syncope, edema, PND or orthopnea. GI:  No changes in bowel movements, no abdominal pain, no bloating, anorexia, nausea, vomiting or heartburn. :  No frequency or dysuria. Denies incontinence. Extremities:  No joint pain, stiffness or swelling. Skin:  No recent rashes or mole changes. Neurological:  No numbness, tingling, burning paresthesias or loss of motor strength.   No syncope, dizziness, frequent headaches or memory loss. Objective:     Vitals:    02/05/18 1058   BP: 152/82   Pulse: 73   Resp: 18   Temp: 98.2 °F (36.8 °C)   TempSrc: Oral   SpO2: 98%   Weight: 131 lb (59.4 kg)   Height: 5' 5\" (1.651 m)       Body mass index is 21.8 kg/(m^2). Physical Examination:              General Appearance:  Well-developed, well-nourished, no acute  distress. HEENT:     Ears:  The TMs and ear canals were clear. Eyes:  The pupillary responses were normal.  Extraocular muscle function intact. Lids and conjunctiva not injected. Neck:  Supple without thyromegaly or adenopathy. No JVD noted. Lungs:  Clear to auscultation and percussion. Cardiac:  Regular rate and rhythm without murmur. GI: nontender w/o mass. Normal BS's. Extremities:  No clubbing, cyanosis or edema. Skin:  No rash or unusual mole changes noted. Neurological:  Grossly normal.            Assessment/Plan:   Impressions:      ICD-10-CM ICD-9-CM    1. Abdominal bloating R14.0 787.3    2. Near syncope R55 780.2         Plan:  1. Continue present meds  2. Lifestyle modifications including Na restriction, low carb/fat diet, weight reduction and exercise (at least a walking program). Follow-up Disposition:  Return in about 2 weeks (around 2/19/2018). No orders of the defined types were placed in this encounter.       Pamela Hernandez MD

## 2018-02-05 NOTE — PROGRESS NOTES
1. Have you been to the ER, urgent care clinic since your last visit? Hospitalized since your last visit? No    2. Have you seen or consulted any other health care providers outside of the [address] [place] [address] since your last visit? Include any pap smears or colon screening.  No   Chief Complaint   Patient presents with    Follow-up     discuss test results

## 2018-02-19 ENCOUNTER — OFFICE VISIT (OUTPATIENT)
Dept: INTERNAL MEDICINE CLINIC | Age: 83
End: 2018-02-19

## 2018-02-19 VITALS
HEIGHT: 65 IN | HEART RATE: 72 BPM | WEIGHT: 131 LBS | OXYGEN SATURATION: 97 % | BODY MASS INDEX: 21.83 KG/M2 | SYSTOLIC BLOOD PRESSURE: 150 MMHG | DIASTOLIC BLOOD PRESSURE: 68 MMHG | TEMPERATURE: 97.8 F

## 2018-02-19 DIAGNOSIS — G31.84 MILD COGNITIVE IMPAIRMENT WITH MEMORY LOSS: ICD-10-CM

## 2018-02-19 DIAGNOSIS — I10 ESSENTIAL HYPERTENSION: Primary | ICD-10-CM

## 2018-02-19 DIAGNOSIS — F41.9 ANXIETY: ICD-10-CM

## 2018-02-19 DIAGNOSIS — E03.8 ADULT ONSET HYPOTHYROIDISM: ICD-10-CM

## 2018-02-19 DIAGNOSIS — R19.8 ALTERED BOWEL FUNCTION: ICD-10-CM

## 2018-02-19 RX ORDER — AMLODIPINE BESYLATE 2.5 MG/1
2.5 TABLET ORAL 2 TIMES DAILY
COMMUNITY
End: 2018-04-09 | Stop reason: SDUPTHER

## 2018-02-19 NOTE — PROGRESS NOTES
Subjective:   Gwen Bray is a 80 y.o. female      Chief Complaint   Patient presents with    Bloated     2 wk follow up        History of present illness: Ms. Juli Topete returns in follow up. She is still concerned about the way she feels. Some of this has to do with alteration of her bowel habits in that she does not have a good bowel movement first thing every morning like she used to. She also feels she is slower getting started during the day. She also says when she exercises sometimes she overdoes and gets too tired. We've not found much abnormal with extensive studies. With regard to her bowel habits, virtually the only thing left to do would be colonoscopy, which I wouldn't want to put her through at her age. Will check an FOBT and if that is negative defer colonoscopy, but if positive it may be indicated under those circumstances. Her blood pressure is elevated and this could be causing some of her feelings and will increase her Amlodipine slightly. She is due for her comprehensive examination in early March anyway. Thyroid was okay last time it was checked and I am not sure that is contributing. Mostly I think she is anxious about things not being quite as good as they used to be, but there is some aging phenomenon to her symptoms to some degree I would believe. Will continue to be on the lookout for anything else, however. For now I told her to return to the gym and exercise at about half the level that she was previously and try to work up from there and also I have asked her to increase Amlodipine to see how she does with that. We will check FOBT.         Patient Active Problem List    Diagnosis Date Noted    Adult onset hypothyroidism 08/15/2017     Priority: 1 - One    Dyslipidemia 08/15/2017     Priority: 1 - One    Hypertension 08/15/2017     Priority: 1 - One    Anxiety 08/15/2017     Priority: 2 - Two    Mild cognitive impairment with memory loss 08/15/2017     Priority: 2 - Two  DJD (degenerative joint disease) 08/29/2017     Priority: 3 - Three    Abdominal bloating 08/15/2017     Priority: 3 - Three    ASHD (arteriosclerotic heart disease) 08/15/2017     Priority: 3 - Three    History of TIA (transient ischemic attack) 08/15/2017     Priority: 3 - Three    Near syncope 08/15/2017     Priority: 3 - Three    ARMD (age related macular degeneration) 08/15/2017     Priority: 4 - Four    Frequent UTI 08/15/2017     Priority: 4 - Four    Loss of hair 08/15/2017     Priority: 4 - Four    Urinary incontinence 08/15/2017     Priority: 4 - Four    Villous adenoma 08/15/2017     Priority: 4 - Four    Family history of colon cancer 08/15/2017     Priority: 5 - Five    H/O basal cell carcinoma excision 08/15/2017     Priority: 5 - Five    Herpes zoster without complication 36/72/1502     Priority: 5 - Five    [place] (hard of hearing) 08/15/2017     Priority: 5 - Five    Leg cramps 08/15/2017     Priority: 5 - Five    Medial meniscus tear 08/15/2017     Priority: 5 - Five    Osteoporosis 08/15/2017     Priority: 5 - Five    Vitamin D deficiency 08/15/2017     Priority: 5 - Five    PE (physical exam), annual 11/29/2017      Past Surgical History:   Procedure Laterality Date    ABDOMEN SURGERY PROC UNLISTED      colon resection    HX CATARACT REMOVAL Bilateral     HX HEENT      partial thyroidectomy    HX HEENT  02/02/2017    redo neck exploration w/completion thyroidectomy    HX OTHER SURGICAL      skin cancer removed    HX TONSILLECTOMY      HX UROLOGICAL      Contigen injections    FL COLONOSCOPY FLX DX W/COLLJ SPEC WHEN PFRMD  3/13/2013           Allergies   Allergen Reactions    Statins-Hmg-Coa Reductase Inhibitors Unknown (comments)      Family History   Problem Relation Age of Onset    Cancer Mother      stomach    Dementia Mother     Hypertension Mother     Hypertension Father     Cancer Sister      Colon Cancer    Heart Disease Brother     Diabetes Brother    [place] Cancer Brother      Colon Cancer    Heart Disease Son       Social History     Social History    Marital status:      Spouse name: N/A    Number of children: N/A    Years of education: N/A     Occupational History    Not on file. Social History Main Topics    Smoking status: Never Smoker    Smokeless tobacco: Never Used    Alcohol use No    Drug use: No    Sexual activity: Not on file     Other Topics Concern    Not on file     Social History Narrative     Outpatient Prescriptions Marked as Taking for the 2/19/18 encounter (Office Visit) with Maylin Vernon MD   Medication Sig Dispense Refill    amLODIPine (NORVASC) 2.5 mg tablet Take 2.5 mg by mouth two (2) times a day.  famotidine (PEPCID) 20 mg tablet Take 1 Tab by mouth two (2) times a day. 30 Tab 1    levothyroxine (SYNTHROID) 75 mcg tablet Take 75 mcg by mouth five (5) days a week. M->F      VIT C/E/ZN/COPPR/LUTEIN/ZEAXAN (PRESERVISION AREDS 2 PO) Take 1 Tab by mouth two (2) times a day.  carboxymethylcellulose sodium (THERATEARS) 0.25 % drop Apply  to eye.  CHOLECALCIFEROL, VITAMIN D3, (VITAMIN D3 PO) Take 1,000 Units by mouth daily.  levothyroxine (SYNTHROID) 50 mcg tablet Take 50 mcg by mouth two (2) times a week. Sat/Sun      aspirin delayed-release 81 mg tablet Take 81 mg by mouth daily.  cyanocobalamin (VITAMIN B-12) 500 mcg tablet Take 1,000 mcg by mouth daily. Review of Systems              Constitutional:  She denies fever, weight loss, sweats or fatigue. HEENT:  No blurred or double vision, headache or dizziness. No difficulty with swallowing, taste, speech or smell. Respiratory:  No cough, wheezing or shortness of breath. No sputum production. Cardiac:  Denies chest pain, palpitations, unexplained indigestion, syncope, edema, PND or orthopnea. GI:  No changes in bowel movements, no abdominal pain, no bloating, anorexia, nausea, vomiting or heartburn.   :  No frequency or dysuria. Denies incontinence. Extremities:  No joint pain, stiffness or swelling. Skin:  No recent rashes or mole changes. Neurological:  No numbness, tingling, burning paresthesias or loss of motor strength. No syncope, dizziness, frequent headaches or memory loss. Objective:     Vitals:    02/19/18 1019   BP: 150/68   Pulse: 72   Temp: 97.8 °F (36.6 °C)   TempSrc: Oral   SpO2: 97%   Weight: 131 lb (59.4 kg)   Height: 5' 5\" (1.651 m)       Body mass index is 21.8 kg/(m^2). Physical Examination:              General Appearance:  Well-developed, well-nourished, no acute  distress. HEENT:     Ears:  The TMs and ear canals were clear. Eyes:  The pupillary responses were normal.  Extraocular muscle function intact. Lids and conjunctiva not injected. Neck:  Supple without thyromegaly or adenopathy. No JVD noted. Lungs:  Clear to auscultation and percussion. Cardiac:  Regular rate and rhythm without murmur. GI: nontender w/o mass. Normal BS's. Extremities:  No clubbing, cyanosis or edema. Skin:  No rash or unusual mole changes noted. Neurological:  Grossly normal.            Assessment/Plan:   Impressions:      ICD-10-CM ICD-9-CM    1. Essential hypertension I10 401.9    2. Anxiety F41.9 300.00    3. Mild cognitive impairment with memory loss G31.84 331.83      780.93    4. Adult onset hypothyroidism E03.8 244.8    5. Altered bowel function R19.4 787.99 AMB POC FECAL OCCULT BLOOD (INSURE FIT)        Plan:  1. Continue present meds  2. Lifestyle modifications including Na restriction, low carb/fat diet,  and exercise (at least a walking program). 3. Increase amlodipine        Follow-up Disposition:  Return for As scheduled.       Orders Placed This Encounter    AMB POC FECAL OCCULT BLOOD (INSURE FIT)       Yodit Anthony MD

## 2018-02-19 NOTE — PROGRESS NOTES
Reviewed record in preparation for visit and have obtained necessary documentation. Identified pt with two pt identifiers(name and ). Chief Complaint   Patient presents with    Bloated     2 wk follow up        Coordination of Care Questionnaire:  :     1) Have you been to an emergency room, urgent care clinic since your last visit? no    Hospitalized since your last visit? no              2) Have you seen or consulted any other health care providers outside of [address] [place] [address] since your last visit?  Yes, saw Dr Marie Jones last week

## 2018-02-20 LAB — HEMOCCULT STL QL: POSITIVE

## 2018-02-20 NOTE — PROGRESS NOTES
Patient informed that   FOBT +.  Stop aspirin and vitamin x 7 days and repeat FOBT before restarting those meds

## 2018-02-27 LAB — HEMOCCULT STL QL: NEGATIVE

## 2018-03-01 ENCOUNTER — OFFICE VISIT (OUTPATIENT)
Dept: INTERNAL MEDICINE CLINIC | Age: 83
End: 2018-03-01

## 2018-03-01 VITALS
TEMPERATURE: 98.3 F | BODY MASS INDEX: 21.63 KG/M2 | DIASTOLIC BLOOD PRESSURE: 64 MMHG | WEIGHT: 129.8 LBS | HEIGHT: 65 IN | SYSTOLIC BLOOD PRESSURE: 138 MMHG | OXYGEN SATURATION: 96 % | HEART RATE: 70 BPM

## 2018-03-01 DIAGNOSIS — E78.5 DYSLIPIDEMIA: ICD-10-CM

## 2018-03-01 DIAGNOSIS — R14.0 ABDOMINAL BLOATING: ICD-10-CM

## 2018-03-01 DIAGNOSIS — E03.8 ADULT ONSET HYPOTHYROIDISM: ICD-10-CM

## 2018-03-01 DIAGNOSIS — G31.84 MILD COGNITIVE IMPAIRMENT WITH MEMORY LOSS: ICD-10-CM

## 2018-03-01 DIAGNOSIS — I10 ESSENTIAL HYPERTENSION: Primary | ICD-10-CM

## 2018-03-01 NOTE — PROGRESS NOTES
Reviewed record in preparation for visit and have obtained necessary documentation. Identified pt with two pt identifiers(name and ). Chief Complaint   Patient presents with    Diabetes     3mth fasting    Coronary Artery Disease        Coordination of Care Questionnaire:  :     1) Have you been to an emergency room, urgent care clinic since your last visit? No     Hospitalized since your last visit? no              2) Have you seen or consulted any other health care providers outside of [address] [place] [address] since your last visit?  Yes Dr Yamilka Phillips

## 2018-03-01 NOTE — PROGRESS NOTES
Subjective:   Terri Aguirre is a 80 y.o. female      Chief Complaint   Patient presents with    Diabetes     3mth fasting    Coronary Artery Disease        History of present illness:  Ms. Julio Valdez returns in follow up. Since we increased her blood pressure medication she has felt much better. She comes back in with a normal blood pressure. She's not had issues with recurrent abdominal bloating or abdominal pain and we're going to try her off the Famotidine, trying to simplify her regimen as much as any. She still has some mild issues with her memory, but nothing that is truly indicative of dementia. She still is trying to manage her diet relative to her hyperlipidemia. She remains stable and repleted with regard to her hypothyroidism. We did talk about avoiding the elliptical since that tends to tire her out very much while she is exercising and simply to return to brisk walking. I think she'll do better with that and not have so much issue with her exercise. She did go bowling yesterday without difficulties as well. Overall at 88 she is doing extremely well. Will check her again in ten weeks time.         Patient Active Problem List    Diagnosis Date Noted    Adult onset hypothyroidism 08/15/2017     Priority: 1 - One    Dyslipidemia 08/15/2017     Priority: 1 - One    Hypertension 08/15/2017     Priority: 1 - One    Anxiety 08/15/2017     Priority: 2 - Two    Mild cognitive impairment with memory loss 08/15/2017     Priority: 2 - Two    DJD (degenerative joint disease) 08/29/2017     Priority: 3 - Three    Abdominal bloating 08/15/2017     Priority: 3 - Three    ASHD (arteriosclerotic heart disease) 08/15/2017     Priority: 3 - Three    History of TIA (transient ischemic attack) 08/15/2017     Priority: 3 - Three    Near syncope 08/15/2017     Priority: 3 - Three    ARMD (age related macular degeneration) 08/15/2017     Priority: 4 - Four    Frequent UTI 08/15/2017     Priority: 4 - Four    Loss of hair 08/15/2017     Priority: 4 - Four    Urinary incontinence 08/15/2017     Priority: 4 - Four    Villous adenoma 08/15/2017     Priority: 4 - Four    Family history of colon cancer 08/15/2017     Priority: 5 - Five    H/O basal cell carcinoma excision 08/15/2017     Priority: 5 - Five    Herpes zoster without complication 66/90/3453     Priority: 5 - Five    [place] (hard of hearing) 08/15/2017     Priority: 5 - Five    Leg cramps 08/15/2017     Priority: 5 - Five    Medial meniscus tear 08/15/2017     Priority: 5 - Five    Osteoporosis 08/15/2017     Priority: 5 - Five    Vitamin D deficiency 08/15/2017     Priority: 5 - Five    PE (physical exam), annual 11/29/2017      Past Surgical History:   Procedure Laterality Date    ABDOMEN SURGERY PROC UNLISTED      colon resection    HX CATARACT REMOVAL Bilateral     HX HEENT      partial thyroidectomy    HX HEENT  02/02/2017    redo neck exploration w/completion thyroidectomy    HX OTHER SURGICAL      skin cancer removed    HX TONSILLECTOMY      HX UROLOGICAL      Contigen injections    NM COLONOSCOPY FLX DX W/COLLJ SPEC WHEN PFRMD  3/13/2013           Allergies   Allergen Reactions    Statins-Hmg-Coa Reductase Inhibitors Unknown (comments)      Family History   Problem Relation Age of Onset    Cancer Mother      stomach    Dementia Mother     Hypertension Mother     Hypertension Father     Cancer Sister      Colon Cancer    Heart Disease Brother     Diabetes Brother     Cancer Brother      Colon Cancer    Heart Disease Son       Social History     Social History    Marital status:      Spouse name: N/A    Number of children: N/A    Years of education: N/A     Occupational History    Not on file.      Social History Main Topics    Smoking status: Never Smoker    Smokeless tobacco: Never Used    Alcohol use No    Drug use: No    Sexual activity: Not on file     Other Topics Concern    Not on file     Social History Narrative     Outpatient Prescriptions Marked as Taking for the 3/1/18 encounter (Office Visit) with Nolvia Cantu MD   Medication Sig Dispense Refill    amLODIPine (NORVASC) 2.5 mg tablet Take 2.5 mg by mouth two (2) times a day.  levothyroxine (SYNTHROID) 75 mcg tablet Take 75 mcg by mouth five (5) days a week. M->F      VIT C/E/ZN/COPPR/LUTEIN/ZEAXAN (PRESERVISION AREDS 2 PO) Take 1 Tab by mouth two (2) times a day.  carboxymethylcellulose sodium (THERATEARS) 0.25 % drop Apply  to eye.  CHOLECALCIFEROL, VITAMIN D3, (VITAMIN D3 PO) Take 1,000 Units by mouth daily.  levothyroxine (SYNTHROID) 50 mcg tablet Take 50 mcg by mouth two (2) times a week. Sat/Sun      aspirin delayed-release 81 mg tablet Take 81 mg by mouth daily.  cyanocobalamin (VITAMIN B-12) 500 mcg tablet Take 1,000 mcg by mouth daily. Review of Systems              Constitutional:  She denies fever, weight loss, sweats or fatigue. HEENT:  No blurred or double vision, headache or dizziness. No difficulty with swallowing, taste, speech or smell. Respiratory:  No cough, wheezing or shortness of breath. No sputum production. Cardiac:  Denies chest pain, palpitations, unexplained indigestion, syncope, edema, PND or orthopnea. GI:  No changes in bowel movements, no abdominal pain, no bloating, anorexia, nausea, vomiting or heartburn. :  No frequency or dysuria. Denies incontinence. Extremities:  No joint pain, stiffness or swelling. Skin:  No recent rashes or mole changes. Neurological:  No numbness, tingling, burning paresthesias or loss of motor strength. No syncope, dizziness, frequent headaches or memory loss. Objective:     Vitals:    03/01/18 0907   BP: 138/64   Pulse: 70   Temp: 98.3 °F (36.8 °C)   TempSrc: Oral   SpO2: 96%   Weight: 129 lb 12.8 oz (58.9 kg)   Height: 5' 5\" (1.651 m)   PainSc:   0 - No pain       Body mass index is 21.6 kg/(m^2).    Physical Examination: General Appearance:  Well-developed, well-nourished, no acute  distress. HEENT:     Ears:  The TMs and ear canals were clear. Eyes:  The pupillary responses were normal.  Extraocular muscle function intact. Lids and conjunctiva not injected. Neck:  Supple without thyromegaly or adenopathy. No JVD noted. Lungs:  Clear to auscultation and percussion. Cardiac:  Regular rate and rhythm without murmur. GI: nontender w/o mass. Normal BS's. Extremities:  No clubbing, cyanosis or edema. Skin:  No rash or unusual mole changes noted. Neurological:  Grossly normal.            Assessment/Plan:   Impressions:      ICD-10-CM ICD-9-CM    1. Essential hypertension I10 401.9    2. Abdominal bloating R14.0 787.3    3. Adult onset hypothyroidism E03.8 244.8    4. Dyslipidemia E78.5 272.4    5. Mild cognitive impairment with memory loss G31.84 331.83      780.93         Plan:  1. Continue present meds  2. Discussed lifestyle modifications including Na restriction, low carb/fat diet and exercise (at least a walking program). Follow-up Disposition:  Return in about 10 months (around 1/1/2019). No orders of the defined types were placed in this encounter.       Chema Cleveland MD

## 2018-04-05 RX ORDER — LEVOTHYROXINE SODIUM 75 UG/1
TABLET ORAL
Qty: 90 TAB | Refills: 3 | Status: SHIPPED | OUTPATIENT
Start: 2018-04-05 | End: 2018-07-18

## 2018-04-09 RX ORDER — AMLODIPINE BESYLATE 2.5 MG/1
2.5 TABLET ORAL 2 TIMES DAILY
Qty: 180 TAB | Refills: 3 | Status: SHIPPED | OUTPATIENT
Start: 2018-04-09 | End: 2018-08-28

## 2018-04-09 NOTE — TELEPHONE ENCOUNTER
Requested Prescriptions     Pending Prescriptions Disp Refills    amLODIPine (NORVASC) 2.5 mg tablet 180 Tab 3     Sig: Take 1 Tab by mouth two (2) times a day.

## 2018-04-23 ENCOUNTER — OFFICE VISIT (OUTPATIENT)
Dept: INTERNAL MEDICINE CLINIC | Age: 83
End: 2018-04-23

## 2018-04-23 VITALS
SYSTOLIC BLOOD PRESSURE: 146 MMHG | OXYGEN SATURATION: 98 % | DIASTOLIC BLOOD PRESSURE: 62 MMHG | HEART RATE: 80 BPM | HEIGHT: 65 IN

## 2018-04-23 DIAGNOSIS — T07.XXXA MULTIPLE CONTUSIONS: Primary | ICD-10-CM

## 2018-04-23 NOTE — PROGRESS NOTES
Subjective:   Daniela Lott is a 80 y.o. female      Chief Complaint   Patient presents with    Fall     fell (tripped) last week while on vacation and still having right knee pain    Rapid Heart Rate     got up in the middle of the night last night due to knee pain and her heart felt like it was \"racing\"        History of present illness:  Ms. Lucy Stanton returns prior to her regularly scheduled appointment. Five days ago she was on vacation with family and moving past her sister who was sitting on the bed with her legs outstretched and she tripped over her legs, falling onto her right buttock and right knee. She noted almost immediate bruising in both of these areas. She also noted significant bruising and hematoma formation in the left humerus area. She's been ambulatory, though still has some residual pain in her right knee predominantly. The areas of bruising seem to be improving, but are still there. She also suffered an abrasion over the dorsal aspect of her right hand laterally, which seems to be healing well at the present time. Xrays of all the areas involved - right hip, right knee and left humerus - don't reveal fractures. I think it's a matter of time before she heals. She can use Advil as needed. She'll let us know if things don't progressively improve. Otherwise she'll follow up as scheduled. She did have one episode of feeling as though her heart was racing last night when she awoke and was anxious, but has not had that otherwise. She'll let us know if that recurs as well. Otherwise I'm comfortable where she is that she hasn't fractured anything and I've cautioned her to maintain a good activity level. I don't want her driving until the knee fully heals, however.         Patient Active Problem List    Diagnosis Date Noted    Adult onset hypothyroidism 08/15/2017     Priority: 1 - One    Dyslipidemia 08/15/2017     Priority: 1 - One    Hypertension 08/15/2017     Priority: 1 - One    Anxiety 08/15/2017     Priority: 2 - Two    Mild cognitive impairment with memory loss 08/15/2017     Priority: 2 - Two    DJD (degenerative joint disease) 08/29/2017     Priority: 3 - Three    Abdominal bloating 08/15/2017     Priority: 3 - Three    ASHD (arteriosclerotic heart disease) 08/15/2017     Priority: 3 - Three    History of TIA (transient ischemic attack) 08/15/2017     Priority: 3 - Three    Near syncope 08/15/2017     Priority: 3 - Three    ARMD (age related macular degeneration) 08/15/2017     Priority: 4 - Four    Frequent UTI 08/15/2017     Priority: 4 - Four    Loss of hair 08/15/2017     Priority: 4 - Four    Urinary incontinence 08/15/2017     Priority: 4 - Four    Villous adenoma 08/15/2017     Priority: 4 - Four    Family history of colon cancer 08/15/2017     Priority: 5 - Five    H/O basal cell carcinoma excision 08/15/2017     Priority: 5 - Five    Herpes zoster without complication 41/79/0590     Priority: 5 - Five    [place] (hard of hearing) 08/15/2017     Priority: 5 - Five    Leg cramps 08/15/2017     Priority: 5 - Five    Medial meniscus tear 08/15/2017     Priority: 5 - Five    Osteoporosis 08/15/2017     Priority: 5 - Five    Vitamin D deficiency 08/15/2017     Priority: 5 - Five    PE (physical exam), annual 11/29/2017      Past Surgical History:   Procedure Laterality Date    ABDOMEN SURGERY PROC UNLISTED      colon resection    HX CATARACT REMOVAL Bilateral     HX HEENT      partial thyroidectomy    HX HEENT  02/02/2017    redo neck exploration w/completion thyroidectomy    HX OTHER SURGICAL      skin cancer removed    HX TONSILLECTOMY      HX UROLOGICAL      Contigen injections    MI COLONOSCOPY FLX DX W/COLLJ SPEC WHEN PFRMD  3/13/2013           Allergies   Allergen Reactions    Statins-Hmg-Coa Reductase Inhibitors Unknown (comments)      Family History   Problem Relation Age of Onset    Cancer Mother      stomach    Dementia Mother     Hypertension Mother     Hypertension Father     Cancer Sister      Colon Cancer    Heart Disease Brother     Diabetes Brother     Cancer Brother      Colon Cancer    Heart Disease Son       Social History     Social History    Marital status:      Spouse name: N/A    Number of children: N/A    Years of education: N/A     Occupational History    Not on file. Social History Main Topics    Smoking status: Never Smoker    Smokeless tobacco: Never Used    Alcohol use No    Drug use: No    Sexual activity: Not on file     Other Topics Concern    Not on file     Social History Narrative     Outpatient Prescriptions Marked as Taking for the 4/23/18 encounter (Office Visit) with Lane Matos MD   Medication Sig Dispense Refill    amLODIPine (NORVASC) 2.5 mg tablet Take 1 Tab by mouth two (2) times a day. 180 Tab 3    levothyroxine (SYNTHROID) 75 mcg tablet TAKE ONE TABLET BY MOUTH ONCE DAILY 90 Tab 3    VIT C/E/ZN/COPPR/LUTEIN/ZEAXAN (PRESERVISION AREDS 2 PO) Take 1 Tab by mouth two (2) times a day.  carboxymethylcellulose sodium (THERATEARS) 0.25 % drop Apply  to eye.  CHOLECALCIFEROL, VITAMIN D3, (VITAMIN D3 PO) Take 1,000 Units by mouth daily.  levothyroxine (SYNTHROID) 50 mcg tablet Take 50 mcg by mouth two (2) times a week. Sat/Sun      aspirin delayed-release 81 mg tablet Take 81 mg by mouth daily.  cyanocobalamin (VITAMIN B-12) 500 mcg tablet Take 1,000 mcg by mouth daily. Review of Systems              Constitutional:  She denies fever, weight loss, sweats or fatigue. HEENT:  No blurred or double vision, headache or dizziness. No difficulty with swallowing, taste, speech or smell. Respiratory:  No cough, wheezing or shortness of breath. No sputum production. Cardiac:  Denies chest pain, palpitations, unexplained indigestion, syncope, edema, PND or orthopnea.     GI:  No changes in bowel movements, no abdominal pain, no bloating, anorexia, nausea, vomiting or heartburn. :  No frequency or dysuria. Denies incontinence. Extremities:  Bruising right hip and left humerus  Skin:  No recent rashes or mole changes. Neurological:  No numbness, tingling, burning paresthesias or loss of motor strength. No syncope, dizziness, frequent headaches or memory loss. Objective:     Vitals:    04/23/18 1046   BP: 146/62   Pulse: 80   SpO2: 98%   Height: 5' 5\" (1.651 m)   PainSc:   6   PainLoc: Knee       There is no height or weight on file to calculate BMI. Physical Examination:              General Appearance:  Well-developed, well-nourished, no acute  distress. HEENT:     Ears:  The TMs and ear canals were clear. Eyes:  The pupillary responses were normal.  Extraocular muscle function intact. Lids and conjunctiva not injected. Neck:  Supple without thyromegaly or adenopathy. No JVD noted. Lungs:  Clear to auscultation and percussion. Cardiac:  Regular rate and rhythm without murmur. GI: nontender w/o mass. Normal BS's. Extremities:  Hematoma over left humerus and right hip and knee. Abrasion right hand is clean. Skin:  No rash or unusual mole changes noted. Neurological:  Grossly normal.            Assessment/Plan:   Impressions:      ICD-10-CM ICD-9-CM    1. Multiple contusions T07. XXXA 924.8 XR KNEE RT MIN 4 V      XR HIP RT W OR WO PELV 2-3 VWS      XR HUMERUS LT        Plan:  1. Continue present meds  2. Discussed lifestyle modifications including Na restriction and exercise (at least a walking program). 3. Tincture of time and Advil for injuries        Follow-up Disposition:  Return for If sxs worsen or fail to improve; or as scheduled.       Orders Placed This Encounter    XR KNEE RT MIN 4 V     Standing Status:   Future     Number of Occurrences:   1     Standing Expiration Date:   4/24/2019     Order Specific Question:   Reason for Exam     Answer:   contusions    XR HIP RT W OR WO PELV 2-3 VWS     Standing Status:   Future Number of Occurrences:   1     Standing Expiration Date:   5/23/2019     Order Specific Question:   Reason for Exam     Answer:   contusion    XR HUMERUS LT     Standing Status:   Future     Number of Occurrences:   1     Standing Expiration Date:   5/23/2019     Order Specific Question:   Reason for Exam     Answer:   contusion       Cecy Du MD

## 2018-04-23 NOTE — MR AVS SNAPSHOT
[facility] 
 
 
 [address] [id number] [phone] Patient: Estrada Gillis MRN: [id number] Visit Information Date & Time Provider Department Dept. Phone Encounter #  
 2018 10:40 AM Lane Matos  [address] [phone] [id number] Your Appointments 5/10/2018 11:00 AM  
FO[facility] UP 10 [facility] Appt Note: [address] [id number] . [facility] [id number] Upcoming Health Maintenance Date Due  
 GLAUCOMA SCREENING Q2Y 1994 MEDICARE YEARLY EXAM 3/14/2018 DTaP/Tdap/Td series (2 - Td) 2021 Allergies as of 2018  Review Complete On: 2018 By: Cristiano Maza LPN Severity Noted Reaction Type Reactions Statins-hmg-coa Reductase Inhibitors  08/15/2017    Unknown (comments) Current Immunizations  Never Reviewed Name Date Influenza High Dose Vaccine PF 10/25/2017 Influenza Vaccine 10/21/2016, 10/1/2016, 10/12/2015, 10/9/2014, 10/9/2014, 2013, 10/16/2012, 10/4/2011 Pneumococcal Conjugate (PCV-13) 2015 Pneumococcal Vaccine (Unspecified Type) 3/1/2001 Tdap 2011 Zoster Vaccine, Live 2008 Not reviewed this visit You Were Diagnosed With   
  
 Codes Comments Multiple contusions    -  Primary ICD-10-CM: T07. Herlinda Abdullahieker ICD-9-CM: 924.8 Vitals BP Pulse Height(growth percentile) LMP SpO2 OB Status 146/62 (BP 1 Location: Right arm, BP Patient Position: Sitting) 80 5' 5\" (1.651 m) (LMP Unknown) 98% Postmenopausal  
 Smoking Status Never Smoker Vitals History Preferred Pharmacy Pharmacy Name Phone [facility] [address] [phone] Your Updated Medication List  
  
   
This list is accurate as of 4/23/18 11:59 AM.  Always use your most recent med list. amLODIPine 2.5 mg tablet Commonly known as:  Pradip Emerald Take 1 Tab by mouth two (2) times a day. aspirin delayed-release 81 mg tablet Take 81 mg by mouth daily. PRESERVISION AREDS 2 PO Take 1 Tab by mouth two (2) times a day. * synthroid 50 mcg tablet Generic drug:  levothyroxine Take 50 mcg by mouth two (2) times a week. Sat/Sun  
  
 * levothyroxine 75 mcg tablet Commonly known as:  SYNTHROID  
TAKE ONE TABLET BY MOUTH ONCE DAILY THERATEARS 0.25 % Drop Generic drug:  carboxymethylcellulose sodium Apply  to eye. VITAMIN B-12 500 mcg tablet Generic drug:  cyanocobalamin Take 1,000 mcg by mouth daily. VITAMIN D3 PO Take 1,000 Units by mouth daily. * Notice: This list has 2 medication(s) that are the same as other medications prescribed for you. Read the directions carefully, and ask your doctor or other care provider to review them with you. To-Do List   
 04/23/2018 Imaging:  XR HIP RT W OR WO PELV 2-3 VWS   
  
 04/23/2018 Imaging:  XR HUMERUS LT   
  
 04/23/2018 Imaging:  XR KNEE RT MIN 4 V Introducing \[facility] [place] [facility]\"" & HEALTH SERVICES! [employer] introduces [employer] patient portal. Now you can access parts of your medical record, email your doctor's office, and request medication refills online. 1. In your internet browser, go to [email or web address] 2. Click on the First Time User? Click Here link in the Sign In box. You will see the New Member Sign Up page. 3. Enter your [employer] Access Code exactly as it appears below. You will not need to use this code after youve completed the sign-up process. If you do not sign up before the expiration date, you must request a new code. · [employer] Access Code: [email or web address] Expires: 5/2/2018  4:07 PM 
 
 4. Enter the last four digits of your Social Security Number (xxxx) and Date of Birth (mm/dd/yyyy) as indicated and click Submit. You will be taken to the next sign-up page. 5. Create a Bean[employer] ID. This will be your [employer] login ID and cannot be changed, so think of one that is secure and easy to remember. 6. Create a Bean[employer] password. You can change your password at any time. 7. Enter your Password Reset Question and Answer. This can be used at a later time if you forget your password. 8. Enter your e-mail address. You will receive e-mail notification when new information is available in [address] 9. Click Sign Up. You can now view and download portions of your medical record. 10. Click the Download Summary menu link to download a portable copy of your medical information. If you have questions, please visit the Frequently Asked Questions section of the Bean[employer] website. Remember, [employer] is NOT to be used for urgent needs. For medical emergencies, dial 911. Now available from your iPhone and Android! Please provide this summary of care documentation to your next provider. Your primary care clinician is listed as Orville Curry. If you have any questions after today's visit, please call [phone].

## 2018-04-23 NOTE — PROGRESS NOTES
Reviewed record in preparation for visit and have obtained necessary documentation. Identified pt with two pt identifiers(name and ). Chief Complaint   Patient presents with   Linda Olivera     fell (tripped) last week while on vacation and still having right knee pain    Rapid Heart Rate     got up in the middle of the night last night due to knee pain and her heart felt like it was \"racing\"        Coordination of Care Questionnaire:  :     1) Have you been to an emergency room, urgent care clinic since your last visit? No    Hospitalized since your last visit? No          2) Have you seen or consulted any other health care providers outside of [address] [place] [address] since your last visit?      No

## 2018-04-25 ENCOUNTER — TELEPHONE (OUTPATIENT)
Dept: INTERNAL MEDICINE CLINIC | Age: 83
End: 2018-04-25

## 2018-04-25 NOTE — TELEPHONE ENCOUNTER
Patient called and ask if she should use cold or heat on her right leg. Told her you would call back. Bryn Moncada

## 2018-05-10 ENCOUNTER — OFFICE VISIT (OUTPATIENT)
Dept: INTERNAL MEDICINE CLINIC | Age: 83
End: 2018-05-10

## 2018-05-10 VITALS
BODY MASS INDEX: 21.99 KG/M2 | SYSTOLIC BLOOD PRESSURE: 138 MMHG | DIASTOLIC BLOOD PRESSURE: 78 MMHG | WEIGHT: 132 LBS | OXYGEN SATURATION: 96 % | HEIGHT: 65 IN | HEART RATE: 80 BPM

## 2018-05-10 DIAGNOSIS — M79.604 RIGHT LEG PAIN: ICD-10-CM

## 2018-05-10 DIAGNOSIS — I10 ESSENTIAL HYPERTENSION: Primary | ICD-10-CM

## 2018-05-10 NOTE — PROGRESS NOTES
Subjective:   Natanael Bardales is a 80 y.o. female      Chief Complaint   Patient presents with    Hypertension     3 mo follow up        History of present illness:  Ms. Edgar Groves returns in routine follow up to check her blood pressure, etc.  Her blood pressure is excellent today. She has been previously evaluated regarding her thyroid, which was stable. She is in between appointments with her comprehensive examination scheduled for August.  She has had persistent problems with right leg pain, particularly around the knee, with some minimal swelling below the knee related to her previous trauma. Xrays previously revealed no evidence of fracture. A friend has told her that she could have a hairline fracture that we might have missed and now she is extremely worried about that. I think the only way to sort this out is to let her see the orthopedist at this point. She denies new CR, GI or  symptoms. She has been stable on her other medications. She is concerned about not being able to split her Amlodipine 5 mg tablet completely in half. She takes a half twice a day, but she feels this controls her blood pressure better and I agree with her that I think it does. I told her that as long as she is using one tablet per day, however it is split during the day should not matter. She shouldn't split them several at a time and then not have the correct halves to take. She seems to understand this. At this point we'll let her see the orthopedist.  Most of her pain is anteriorly and she doesn't have any palpable cords and Homans is negative. I doubt she has any evidence for phlebitis. Will let her see the orthopedist and go from there. She'll be due in August for her comprehensive examination.         Patient Active Problem List    Diagnosis Date Noted    Adult onset hypothyroidism 08/15/2017     Priority: 1 - One    Dyslipidemia 08/15/2017     Priority: 1 - One    Hypertension 08/15/2017     Priority: 1 - One  Anxiety 08/15/2017     Priority: 2 - Two    Mild cognitive impairment with memory loss 08/15/2017     Priority: 2 - Two    DJD (degenerative joint disease) 08/29/2017     Priority: 3 - Three    Abdominal bloating 08/15/2017     Priority: 3 - Three    ASHD (arteriosclerotic heart disease) 08/15/2017     Priority: 3 - Three    History of TIA (transient ischemic attack) 08/15/2017     Priority: 3 - Three    Near syncope 08/15/2017     Priority: 3 - Three    ARMD (age related macular degeneration) 08/15/2017     Priority: 4 - Four    Frequent UTI 08/15/2017     Priority: 4 - Four    Loss of hair 08/15/2017     Priority: 4 - Four    Urinary incontinence 08/15/2017     Priority: 4 - Four    Villous adenoma 08/15/2017     Priority: 4 - Four    Family history of colon cancer 08/15/2017     Priority: 5 - Five    H/O basal cell carcinoma excision 08/15/2017     Priority: 5 - Five    Herpes zoster without complication 92/40/2660     Priority: 5 - Five    [place] (hard of hearing) 08/15/2017     Priority: 5 - Five    Leg cramps 08/15/2017     Priority: 5 - Five    Medial meniscus tear 08/15/2017     Priority: 5 - Five    Osteoporosis 08/15/2017     Priority: 5 - Five    Vitamin D deficiency 08/15/2017     Priority: 5 - Five    PE (physical exam), annual 11/29/2017      Past Surgical History:   Procedure Laterality Date    ABDOMEN SURGERY PROC UNLISTED      colon resection    HX CATARACT REMOVAL Bilateral     HX HEENT      partial thyroidectomy    HX HEENT  02/02/2017    redo neck exploration w/completion thyroidectomy    HX OTHER SURGICAL      skin cancer removed    HX TONSILLECTOMY      HX UROLOGICAL      Contigen injections    MA COLONOSCOPY FLX DX W/COLLJ SPEC WHEN PFRMD  3/13/2013           Allergies   Allergen Reactions    Statins-Hmg-Coa Reductase Inhibitors Unknown (comments)      Family History   Problem Relation Age of Onset    Cancer Mother      stomach    Dementia Mother    Sarmad Bee Hypertension Mother     Hypertension Father     Cancer Sister      Colon Cancer    Heart Disease Brother     Diabetes Brother     Cancer Brother      Colon Cancer    Heart Disease Son       Social History     Social History    Marital status:      Spouse name: N/A    Number of children: N/A    Years of education: N/A     Occupational History    Not on file. Social History Main Topics    Smoking status: Never Smoker    Smokeless tobacco: Never Used    Alcohol use No    Drug use: No    Sexual activity: Not on file     Other Topics Concern    Not on file     Social History Narrative     Outpatient Prescriptions Marked as Taking for the 5/10/18 encounter (Office Visit) with Lindsay Solano MD   Medication Sig Dispense Refill    amLODIPine (NORVASC) 2.5 mg tablet Take 1 Tab by mouth two (2) times a day. 180 Tab 3    levothyroxine (SYNTHROID) 75 mcg tablet TAKE ONE TABLET BY MOUTH ONCE DAILY 90 Tab 3    VIT C/E/ZN/COPPR/LUTEIN/ZEAXAN (PRESERVISION AREDS 2 PO) Take 1 Tab by mouth two (2) times a day.  carboxymethylcellulose sodium (THERATEARS) 0.25 % drop Apply  to eye.  CHOLECALCIFEROL, VITAMIN D3, (VITAMIN D3 PO) Take 1,000 Units by mouth daily.  levothyroxine (SYNTHROID) 50 mcg tablet Take 50 mcg by mouth two (2) times a week. Sat/Sun      aspirin delayed-release 81 mg tablet Take 81 mg by mouth daily.  cyanocobalamin (VITAMIN B-12) 500 mcg tablet Take 1,000 mcg by mouth daily. Review of Systems              Constitutional:  She denies fever, weight loss, sweats or fatigue. HEENT:  No blurred or double vision, headache or dizziness. No difficulty with swallowing, taste, speech or smell. Respiratory:  No cough, wheezing or shortness of breath. No sputum production. Cardiac:  Denies chest pain, palpitations, unexplained indigestion, syncope, edema, PND or orthopnea.     GI:  No changes in bowel movements, no abdominal pain, no bloating, anorexia, nausea, vomiting or heartburn. :  No frequency or dysuria. Denies incontinence. Extremities: right leg w/o cords and negative Ron's. Skin:  No recent rashes or mole changes. Neurological:  No numbness, tingling, burning paresthesias or loss of motor strength. No syncope, dizziness, frequent headaches or memory loss. Objective:     Vitals:    05/10/18 1107 05/10/18 1137   BP: 148/74 138/78   Pulse: 80    SpO2: 96%    Weight: 132 lb (59.9 kg)    Height: 5' 5\" (1.651 m)    PainSc:   2    PainLoc: Leg        Body mass index is 21.97 kg/(m^2). Physical Examination:              General Appearance:  Well-developed, well-nourished, no acute  distress. HEENT:     Ears:  The TMs and ear canals were clear. Eyes:  The pupillary responses were normal.  Extraocular muscle function intact. Lids and conjunctiva not injected. Neck:  Supple without thyromegaly or adenopathy. No JVD noted. Lungs:  Clear to auscultation and percussion. Cardiac:  Regular rate and rhythm without murmur. GI: nontender w/o mass. Normal BS's. Extremities:  No clubbing, cyanosis or edema. Skin:  No rash or unusual mole changes noted. Neurological:  Grossly normal.            Assessment/Plan:   Impressions:      ICD-10-CM ICD-9-CM    1. Essential hypertension I10 401.9    2. Right leg pain M79.604 729.5 REFERRAL TO ORTHOPEDIC SURGERY        Plan:  1. Continue present meds  2. Discussed lifestyle modifications including Na restriction, low carb/fat diet, weight reduction and exercise (at least a walking program). Follow-up Disposition:  Return in about 3 months (around 8/10/2018) for CPE.       Orders Placed This Encounter    REFERRAL TO ORTHOPEDIC SURGERY     Referral Priority:   Routine     Referral Type:   Consultation     Referral Reason:   Specialty Services Required     Referred to Provider:   MD Mable Rahman MD

## 2018-05-10 NOTE — PROGRESS NOTES
Reviewed record in preparation for visit and have obtained necessary documentation. Identified pt with two pt identifiers(name and ).     Chief Complaint   Patient presents with    Hypertension     3 mo follow up        Coordination of Care Questionnaire:  :     1) Have you been to an emergency room, urgent care clinic since your last visit? no    Hospitalized since your last visit? no              2) Have you seen or consulted any other health care providers outside of [address] [place] [address] since your last visit? no

## 2018-07-13 ENCOUNTER — TELEPHONE (OUTPATIENT)
Dept: INTERNAL MEDICINE CLINIC | Age: 83
End: 2018-07-13

## 2018-07-13 NOTE — TELEPHONE ENCOUNTER
Patient called stating that her dizziness is better, but she has been having loose stool each time she urinates. Patient advise that per Dr Karel Leonard she can try Imodium and see how she does through the weekend. She will call early in the week and let us know.

## 2018-07-18 ENCOUNTER — HOSPITAL ENCOUNTER (OUTPATIENT)
Dept: CT IMAGING | Age: 83
Discharge: HOME | End: 2018-07-18
Attending: INTERNAL MEDICINE
Payer: MEDICARE

## 2018-07-18 ENCOUNTER — OFFICE VISIT (OUTPATIENT)
Dept: INTERNAL MEDICINE CLINIC | Age: 83
End: 2018-07-18

## 2018-07-18 VITALS
HEART RATE: 75 BPM | OXYGEN SATURATION: 97 % | RESPIRATION RATE: 16 BRPM | DIASTOLIC BLOOD PRESSURE: 68 MMHG | TEMPERATURE: 97.6 F | HEIGHT: 65 IN | SYSTOLIC BLOOD PRESSURE: 153 MMHG

## 2018-07-18 DIAGNOSIS — S09.90XD TRAUMATIC INJURY OF HEAD, SUBSEQUENT ENCOUNTER: ICD-10-CM

## 2018-07-18 DIAGNOSIS — E03.8 ADULT ONSET HYPOTHYROIDISM: ICD-10-CM

## 2018-07-18 DIAGNOSIS — R42 DIZZINESS: Primary | ICD-10-CM

## 2018-07-18 DIAGNOSIS — R42 DIZZINESS: ICD-10-CM

## 2018-07-18 LAB
GRAN# POC: 4.6 K/UL (ref 2–7.8)
GRAN% POC: 71.2 % (ref 37–92)
HCT VFR BLD CALC: 42.2 % (ref 37–51)
HGB BLD-MCNC: 13.9 G/DL (ref 12–18)
LY# POC: 1.6 K/UL (ref 0.6–4.1)
LY% POC: 25.2 % (ref 10–58.5)
MCH RBC QN: 29.5 PG (ref 26–32)
MCHC RBC-ENTMCNC: 33 G/DL (ref 30–36)
MCV RBC: 89 FL (ref 80–97)
MID #, POC: 0.2 K/UL (ref 0–1.8)
MID% POC: 3.6 % (ref 0.1–24)
PLATELET # BLD: 254 K/UL (ref 140–440)
RBC # BLD: 4.72 M/UL (ref 4.2–6.3)
WBC # BLD: 6.4 K/UL (ref 4.1–10.9)

## 2018-07-18 PROCEDURE — 70450 CT HEAD/BRAIN W/O DYE: CPT

## 2018-07-18 RX ORDER — LEVOTHYROXINE SODIUM 75 UG/1
75 TABLET ORAL
COMMUNITY
End: 2018-08-29 | Stop reason: SDUPTHER

## 2018-07-18 NOTE — PROGRESS NOTES
Subjective:   Wade Yancey is a 80 y.o. female      Chief Complaint   Patient presents with    Dizziness     and \"butterflies in my stomach\"   started a week ago        History of present illness:  Ms. Vangie Mendoza comes to the office today having experienced problems with sensation of dizziness. Initially she said it started a week ago, but subsequently corrected that to having been recurrent over the last three months time since she had a fall with extremity injuries at that time. She may have hit her head slightly at that time, but did not lose consciousness. Her sensation is that of being dizzy or lightheaded. She gets up feeling that way generally and it lasts for most of the morning and then seems to go away for the rest of the day. She is on Amlodipine 5 mg, half tablet that she takes at night, and also first thing in the morning. She is also on thyroid and it has been some time since we checked her thyroid to make sure she was adequately replaced. She denies vertigo. She does note some intermittent headache. Her blood pressure has been variable and is mildly elevated here today, but she is anxious. She denies focal weakness. She denies diplopia. She's noted no nausea or vomiting or significant diaphoresis. She denies associable cardiac symptoms, including chest pain or shortness of breath. I think there are a variety of things that could be contributing to this, including issues with her medication. I have asked her to change her Amlodipine to a 5 mg full tablet, but take it at lunchtime and not take evening or morning time. She could be experiencing some allergies and we might have to approach that at some point. It could be anemic or issues with her thyroid, so we will check that. I also think we need to do a CT scan to make sure nothing happened during that fall. We will see her back in a week's time and decide at that point about further therapy.   If she is not improved significantly we could need a neurology referral. We might also want to treat her allergies.         Patient Active Problem List    Diagnosis Date Noted    Adult onset hypothyroidism 08/15/2017     Priority: 1 - One    Dyslipidemia 08/15/2017     Priority: 1 - One    Hypertension 08/15/2017     Priority: 1 - One    Anxiety 08/15/2017     Priority: 2 - Two    Mild cognitive impairment with memory loss 08/15/2017     Priority: 2 - Two    DJD (degenerative joint disease) 08/29/2017     Priority: 3 - Three    Abdominal bloating 08/15/2017     Priority: 3 - Three    ASHD (arteriosclerotic heart disease) 08/15/2017     Priority: 3 - Three    History of TIA (transient ischemic attack) 08/15/2017     Priority: 3 - Three    Near syncope 08/15/2017     Priority: 3 - Three    ARMD (age related macular degeneration) 08/15/2017     Priority: 4 - Four    Frequent UTI 08/15/2017     Priority: 4 - Four    Loss of hair 08/15/2017     Priority: 4 - Four    Urinary incontinence 08/15/2017     Priority: 4 - Four    Villous adenoma 08/15/2017     Priority: 4 - Four    Family history of colon cancer 08/15/2017     Priority: 5 - Five    H/O basal cell carcinoma excision 08/15/2017     Priority: 5 - Five    Herpes zoster without complication 45/30/6225     Priority: 5 - Five    [place] (hard of hearing) 08/15/2017     Priority: 5 - Five    Leg cramps 08/15/2017     Priority: 5 - Five    Medial meniscus tear 08/15/2017     Priority: 5 - Five    Osteoporosis 08/15/2017     Priority: 5 - Five    Vitamin D deficiency 08/15/2017     Priority: 5 - Five    PE (physical exam), annual 11/29/2017      Past Surgical History:   Procedure Laterality Date    ABDOMEN SURGERY PROC UNLISTED      colon resection    HX CATARACT REMOVAL Bilateral     HX HEENT      partial thyroidectomy    HX HEENT  02/02/2017    redo neck exploration w/completion thyroidectomy    HX OTHER SURGICAL      skin cancer removed    HX TONSILLECTOMY      HX UROLOGICAL Contigen injections    NE COLONOSCOPY FLX DX W/COLLJ SPEC WHEN PFRMD  3/13/2013           Allergies   Allergen Reactions    Statins-Hmg-Coa Reductase Inhibitors Unknown (comments)      Family History   Problem Relation Age of Onset    Cancer Mother      stomach    Dementia Mother     Hypertension Mother     Hypertension Father     Cancer Sister      Colon Cancer    Heart Disease Brother     Diabetes Brother     Cancer Brother      Colon Cancer    Heart Disease Son       Social History     Social History    Marital status:      Spouse name: N/A    Number of children: N/A    Years of education: N/A     Occupational History    Not on file. Social History Main Topics    Smoking status: Never Smoker    Smokeless tobacco: Never Used    Alcohol use No    Drug use: No    Sexual activity: Not on file     Other Topics Concern    Not on file     Social History Narrative     Outpatient Prescriptions Marked as Taking for the 7/18/18 encounter (Office Visit) with Nathanial Severin, MD   Medication Sig Dispense Refill    levothyroxine (SYNTHROID) 75 mcg tablet Take  by mouth. 1 tab M->F      amLODIPine (NORVASC) 2.5 mg tablet Take 1 Tab by mouth two (2) times a day. 180 Tab 3    VIT C/E/ZN/COPPR/LUTEIN/ZEAXAN (PRESERVISION AREDS 2 PO) Take 1 Tab by mouth two (2) times a day.  carboxymethylcellulose sodium (THERATEARS) 0.25 % drop Apply  to eye.  CHOLECALCIFEROL, VITAMIN D3, (VITAMIN D3 PO) Take 1,000 Units by mouth daily.  levothyroxine (SYNTHROID) 50 mcg tablet Take 50 mcg by mouth two (2) times a week. Sat/Sun      aspirin delayed-release 81 mg tablet Take 81 mg by mouth daily.  cyanocobalamin (VITAMIN B-12) 500 mcg tablet Take 1,000 mcg by mouth daily. Review of Systems              Constitutional:  She denies fever, weight loss, sweats or fatigue. HEENT:  No blurred or double vision, headache or dizziness.   No difficulty with swallowing, taste, speech or smell.  Respiratory:  No cough, wheezing or shortness of breath. No sputum production. Cardiac:  Denies chest pain, palpitations, unexplained indigestion, syncope, edema, PND or orthopnea. GI:  No changes in bowel movements, no abdominal pain, no bloating, anorexia, nausea, vomiting or heartburn. :  No frequency or dysuria. Denies incontinence. Extremities:  No joint pain, stiffness or swelling. Skin:  No recent rashes or mole changes. Neurological:  No numbness, tingling, burning paresthesias or loss of motor strength. No syncope, dizziness, frequent headaches or memory loss. Objective:     Vitals:    07/18/18 1631   BP: 153/68   Pulse: 75   Resp: 16   Temp: 97.6 °F (36.4 °C)   TempSrc: Oral   SpO2: 97%   Height: 5' 5\" (1.651 m)   PainSc:   0 - No pain       There is no height or weight on file to calculate BMI. Physical Examination:              General Appearance:  Well-developed, well-nourished, no acute  distress. HEENT:     Ears:  The TMs and ear canals were clear. Eyes:  The pupillary responses were normal.  Extraocular muscle function intact. Lids and conjunctiva not injected. Neck:  Supple without thyromegaly or adenopathy. No JVD noted. Lungs:  Clear to auscultation and percussion. Cardiac:  Regular rate and rhythm without murmur. GI: nontender w/o mass. Normal BS's. Extremities:  No clubbing, cyanosis or edema. Skin:  No rash or unusual mole changes noted. Neurological:  Grossly normal.            Assessment/Plan:   Impressions:      ICD-10-CM ICD-9-CM    1. Dizziness R42 780.4 levothyroxine (SYNTHROID) 75 mcg tablet      CT HEAD WO CONT      AMB POC COMPLETE CBC,AUTOMATED ENTER      METABOLIC PANEL, BASIC      TSH 3RD GENERATION      T4, FREE   2. Traumatic injury of head, subsequent encounter S09. 90XD V58.89 levothyroxine (SYNTHROID) 75 mcg tablet     959.01 CT HEAD WO CONT   3.  Adult onset hypothyroidism E03.8 244.8 TSH 3RD GENERATION      T4, FREE Plan:  1. Continue present meds  2. Discussed lifestyle modifications including Na restriction, low carb/fat diet, weight reduction and exercise (at least a walking program). Follow-up Disposition:  Return in about 1 week (around 7/25/2018). Orders Placed This Encounter    CT HEAD WO CONT     Standing Status:   Future     Standing Expiration Date:   8/18/2019     Order Specific Question:   Is Patient Allergic to Contrast Dye?      Answer:   No    METABOLIC PANEL, BASIC    TSH 3RD GENERATION    T4, FREE    AMB POC COMPLETE CBC,AUTOMATED ENTER       Mickey Lama MD

## 2018-07-18 NOTE — PROGRESS NOTES
Carrillo Hay is a 80 y.o. female  Chief Complaint   Patient presents with    Dizziness     and \"butterflies in my stomach\"   started a week ago     Visit Vitals    /68 (BP 1 Location: Left arm, BP Patient Position: Sitting)    Pulse 75    Temp 97.6 °F (36.4 °C) (Oral)    Resp 16    Ht 5' 5\" (1.651 m)    LMP  (LMP Unknown)    SpO2 97%     1. Have you been to the ER, urgent care clinic since your last visit? Hospitalized since your last visit?  no    2. Have you seen or consulted any other health care providers outside of the [facility] since your last visit? Include any pap smears or colon screening.  no    no

## 2018-07-19 LAB
BUN SERPL-MCNC: 13 MG/DL (ref 8–27)
BUN/CREAT SERPL: 18 (ref 12–28)
CALCIUM SERPL-MCNC: 9.3 MG/DL (ref 8.7–10.3)
CHLORIDE SERPL-SCNC: 97 MMOL/L (ref 96–106)
CO2 SERPL-SCNC: 25 MMOL/L (ref 20–29)
CREAT SERPL-MCNC: 0.71 MG/DL (ref 0.57–1)
GLUCOSE SERPL-MCNC: 123 MG/DL (ref 65–99)
POTASSIUM SERPL-SCNC: 5.5 MMOL/L (ref 3.5–5.2)
SODIUM SERPL-SCNC: 136 MMOL/L (ref 134–144)
T4 FREE SERPL-MCNC: 1.59 NG/DL (ref 0.82–1.77)
TSH SERPL DL<=0.005 MIU/L-ACNC: 3.88 UIU/ML (ref 0.45–4.5)

## 2018-07-20 ENCOUNTER — LAB ONLY (OUTPATIENT)
Dept: INTERNAL MEDICINE CLINIC | Age: 83
End: 2018-07-20

## 2018-07-20 DIAGNOSIS — E87.5 HYPERKALEMIA: Primary | ICD-10-CM

## 2018-07-21 LAB — POTASSIUM SERPL-SCNC: 5.1 MMOL/L (ref 3.5–5.2)

## 2018-07-25 ENCOUNTER — OFFICE VISIT (OUTPATIENT)
Dept: INTERNAL MEDICINE CLINIC | Age: 83
End: 2018-07-25

## 2018-07-25 VITALS
HEART RATE: 75 BPM | HEIGHT: 65 IN | DIASTOLIC BLOOD PRESSURE: 64 MMHG | SYSTOLIC BLOOD PRESSURE: 122 MMHG | BODY MASS INDEX: 21.79 KG/M2 | TEMPERATURE: 98.5 F | OXYGEN SATURATION: 96 % | WEIGHT: 130.8 LBS

## 2018-07-25 DIAGNOSIS — R42 DIZZINESS: ICD-10-CM

## 2018-07-25 DIAGNOSIS — I10 ESSENTIAL HYPERTENSION: Primary | ICD-10-CM

## 2018-07-25 RX ORDER — LEVOCETIRIZINE DIHYDROCHLORIDE 5 MG/1
5 TABLET, FILM COATED ORAL
Qty: 30 TAB | Refills: 0 | Status: SHIPPED | OUTPATIENT
Start: 2018-07-25 | End: 2018-08-29

## 2018-07-25 NOTE — PROGRESS NOTES
Reviewed record in preparation for visit and have obtained necessary documentation. Identified pt with two pt identifiers(name and ). Chief Complaint   Patient presents with    Dizziness     1 week follow up        Coordination of Care Questionnaire:  :     1) Have you been to an emergency room, urgent care clinic since your last visit? No     Hospitalized since your last visit? No             2) Have you seen or consulted any other health care providers outside of [address] [place] [address] since your last visit?  No

## 2018-07-27 NOTE — PROGRESS NOTES
Subjective:   Daniel Johnson is a 80 y.o. female      Chief Complaint   Patient presents with    Dizziness     1 week follow up        History of present illness:  Ms. Mesfin Heaton returns in follow up. She is still feeling lightheaded, particularly when she first gets up in the morning. She's concerned it could be some allergies and we're going to try some Xyzal.  I think given her persistent symptoms, however, she should see neurology to see if they have anything further to add. Her blood pressure is stable with the change to Amlodipine one time a day in the middle of the day. She still has several hours in the morning where she just doesn't seem to be right, however. She is anxious and I think that contributes to her symptoms, but we'll see what neurology has to say first.  She's already scheduled for August for her comprehensive examination and we'll follow up at that time.         Patient Active Problem List    Diagnosis Date Noted    Adult onset hypothyroidism 08/15/2017     Priority: 1 - One    Dyslipidemia 08/15/2017     Priority: 1 - One    Hypertension 08/15/2017     Priority: 1 - One    Anxiety 08/15/2017     Priority: 2 - Two    Mild cognitive impairment with memory loss 08/15/2017     Priority: 2 - Two    DJD (degenerative joint disease) 08/29/2017     Priority: 3 - Three    Abdominal bloating 08/15/2017     Priority: 3 - Three    ASHD (arteriosclerotic heart disease) 08/15/2017     Priority: 3 - Three    History of TIA (transient ischemic attack) 08/15/2017     Priority: 3 - Three    Near syncope 08/15/2017     Priority: 3 - Three    ARMD (age related macular degeneration) 08/15/2017     Priority: 4 - Four    Frequent UTI 08/15/2017     Priority: 4 - Four    Loss of hair 08/15/2017     Priority: 4 - Four    Urinary incontinence 08/15/2017     Priority: 4 - Four    Villous adenoma 08/15/2017     Priority: 4 - Four    Family history of colon cancer 08/15/2017     Priority: 5 - Five    H/O basal cell carcinoma excision 08/15/2017     Priority: 5 - Five    Herpes zoster without complication 44/90/2792     Priority: 5 - Five    [place] (hard of hearing) 08/15/2017     Priority: 5 - Five    Leg cramps 08/15/2017     Priority: 5 - Five    Medial meniscus tear 08/15/2017     Priority: 5 - Five    Osteoporosis 08/15/2017     Priority: 5 - Five    Vitamin D deficiency 08/15/2017     Priority: 5 - Five    PE (physical exam), annual 11/29/2017      Past Surgical History:   Procedure Laterality Date    ABDOMEN SURGERY PROC UNLISTED      colon resection    HX CATARACT REMOVAL Bilateral     HX HEENT      partial thyroidectomy    HX HEENT  02/02/2017    redo neck exploration w/completion thyroidectomy    HX OTHER SURGICAL      skin cancer removed    HX TONSILLECTOMY      HX UROLOGICAL      Contigen injections    SD COLONOSCOPY FLX DX W/COLLJ SPEC WHEN PFRMD  3/13/2013           Allergies   Allergen Reactions    Statins-Hmg-Coa Reductase Inhibitors Unknown (comments)      Family History   Problem Relation Age of Onset    Cancer Mother      stomach    Dementia Mother     Hypertension Mother     Hypertension Father     Cancer Sister      Colon Cancer    Heart Disease Brother     Diabetes Brother     Cancer Brother      Colon Cancer    Heart Disease Son       Social History     Social History    Marital status:      Spouse name: N/A    Number of children: N/A    Years of education: N/A     Occupational History    Not on file. Social History Main Topics    Smoking status: Never Smoker    Smokeless tobacco: Never Used    Alcohol use No    Drug use: No    Sexual activity: Not on file     Other Topics Concern    Not on file     Social History Narrative     Outpatient Prescriptions Marked as Taking for the 7/25/18 encounter (Office Visit) with Opal Joseph MD   Medication Sig Dispense Refill    levocetirizine (XYZAL) 5 mg tablet Take 1 Tab by mouth nightly.  30 Tab 0    levothyroxine (SYNTHROID) 75 mcg tablet Take  by mouth. 1 tab M->F      amLODIPine (NORVASC) 2.5 mg tablet Take 1 Tab by mouth two (2) times a day. 180 Tab 3    VIT C/E/ZN/COPPR/LUTEIN/ZEAXAN (PRESERVISION AREDS 2 PO) Take 1 Tab by mouth two (2) times a day.  carboxymethylcellulose sodium (THERATEARS) 0.25 % drop Apply  to eye.  CHOLECALCIFEROL, VITAMIN D3, (VITAMIN D3 PO) Take 1,000 Units by mouth daily.  levothyroxine (SYNTHROID) 50 mcg tablet Take 50 mcg by mouth two (2) times a week. Sat/Sun      aspirin delayed-release 81 mg tablet Take 81 mg by mouth daily.  cyanocobalamin (VITAMIN B-12) 500 mcg tablet Take 1,000 mcg by mouth daily. Review of Systems              Constitutional:  She denies fever, weight loss, sweats or fatigue. HEENT:  No blurred or double vision, headache or dizziness. No difficulty with swallowing, taste, speech or smell. Respiratory:  No cough, wheezing or shortness of breath. No sputum production. Cardiac:  Denies chest pain, palpitations, unexplained indigestion, syncope, edema, PND or orthopnea. GI:  No changes in bowel movements, no abdominal pain, no bloating, anorexia, nausea, vomiting or heartburn. :  No frequency or dysuria. Denies incontinence. Extremities:  No joint pain, stiffness or swelling. Skin:  No recent rashes or mole changes. Neurological:  No numbness, tingling, burning paresthesias or loss of motor strength. No syncope, dizziness, frequent headaches or memory loss. Objective:     Vitals:    07/25/18 1402   BP: 122/64   Pulse: 75   Temp: 98.5 °F (36.9 °C)   TempSrc: Oral   SpO2: 96%   Weight: 130 lb 12.8 oz (59.3 kg)   Height: 5' 5\" (1.651 m)   PainSc:   0 - No pain       Body mass index is 21.77 kg/(m^2). Physical Examination:              General Appearance:  Well-developed, well-nourished, no acute  distress. HEENT:     Ears:  The TMs and ear canals were clear.    Eyes:  The pupillary responses were normal.  Extraocular muscle function intact. Lids and conjunctiva not injected. Neck:  Supple without thyromegaly or adenopathy. No JVD noted. Lungs:  Clear to auscultation and percussion. Cardiac:  Regular rate and rhythm without murmur. GI: nontender w/o mass. Normal BS's. Extremities:  No clubbing, cyanosis or edema. Skin:  No rash or unusual mole changes noted. Neurological:  Grossly normal.            Assessment/Plan:   Impressions:      ICD-10-CM ICD-9-CM    1. Essential hypertension I10 401.9    2. Dizziness R42 780.4 levocetirizine (XYZAL) 5 mg tablet      REFERRAL TO NEUROLOGY        Plan:  1. Continue present meds  2. Discussed lifestyle modifications including Na restriction, low carb/fat diet, weight reduction and exercise (at least a walking program). Follow-up Disposition: Not on File      Orders Placed This Encounter    REFERRAL TO NEUROLOGY     Referral Priority:   Routine     Referral Type:   Consultation     Referral Reason:   Specialty Services Required     Referred to Provider:   Tavon Mirza MD    levocetirizine (XYZAL) 5 mg tablet     Sig: Take 1 Tab by mouth nightly.      Dispense:  30 Tab     Refill:  0       Glory Issa MD

## 2018-08-13 ENCOUNTER — TELEPHONE (OUTPATIENT)
Dept: INTERNAL MEDICINE CLINIC | Age: 83
End: 2018-08-13

## 2018-08-13 DIAGNOSIS — I10 ESSENTIAL HYPERTENSION: Primary | ICD-10-CM

## 2018-08-13 RX ORDER — METOPROLOL SUCCINATE 25 MG/1
25 TABLET, EXTENDED RELEASE ORAL DAILY
Qty: 90 TAB | Refills: 3 | Status: SHIPPED | OUTPATIENT
Start: 2018-08-13 | End: 2018-08-26

## 2018-08-14 ENCOUNTER — OFFICE VISIT (OUTPATIENT)
Dept: NEUROLOGY | Age: 83
End: 2018-08-14

## 2018-08-14 VITALS
WEIGHT: 127 LBS | DIASTOLIC BLOOD PRESSURE: 70 MMHG | HEIGHT: 65 IN | HEART RATE: 65 BPM | SYSTOLIC BLOOD PRESSURE: 138 MMHG | OXYGEN SATURATION: 98 % | BODY MASS INDEX: 21.16 KG/M2

## 2018-08-14 DIAGNOSIS — F41.9 ANXIETY: ICD-10-CM

## 2018-08-14 DIAGNOSIS — R42 EPISODIC LIGHTHEADEDNESS: Primary | ICD-10-CM

## 2018-08-14 NOTE — LETTER
8/14/2018 2:43 PM 
 
Patient:  Guilherme Portillo YOB: 1929 Date of Visit: 8/14/2018 Dear MD Lizette Hoskins [address] [id number] VIA In Basket 
 : Thank you for referring Ms. Patricia Fang to me for evaluation/treatment. Below are the relevant portions of my assessment and plan of care. HISTORY OF PRESENT ILLNESS Guilherme Portillo is a 80 y.o. female. HPI Comments: Keturah Montiel is an 54-year-old  female who comes in today with spells of not feeling right. She states that she simply had the unusual sensation of not feeling right, she specifically denies lightheadedness or chest discomfort problems with weakness in an arm or a leg. She is 80years old and pretty good health. She is here with her daughter-in-law today. She denies visual changes or numbness associated with any of these. Dizziness The history is provided by the patient and relative (DAUGHTER). This is a new problem. Associated symptoms include dizziness. Review of Systems Constitutional:  
     Review of systems is positive for some hearing loss, some memory loss and some occasional muscle pain. Complete review of systems done although is negative. Neurological: Positive for dizziness. Current Outpatient Prescriptions on File Prior to Visit Medication Sig Dispense Refill  metoprolol succinate (TOPROL-XL) 25 mg XL tablet Take 1 Tab by mouth daily. 90 Tab 3  
 levothyroxine (SYNTHROID) 75 mcg tablet Take  by mouth. 1 tab M->F  VIT C/E/ZN/COPPR/LUTEIN/ZEAXAN (PRESERVISION AREDS 2 PO) Take 1 Tab by mouth two (2) times a day.  CHOLECALCIFEROL, VITAMIN D3, (VITAMIN D3 PO) Take 1,000 Units by mouth daily.  levothyroxine (SYNTHROID) 50 mcg tablet Take 50 mcg by mouth two (2) times a week. Sat/Sun    
 aspirin delayed-release 81 mg tablet Take 81 mg by mouth daily.  cyanocobalamin (VITAMIN B-12) 500 mcg tablet Take 1,000 mcg by mouth daily.  levocetirizine (XYZAL) 5 mg tablet Take 1 Tab by mouth nightly. 30 Tab 0  
 amLODIPine (NORVASC) 2.5 mg tablet Take 1 Tab by mouth two (2) times a day. 180 Tab 3  carboxymethylcellulose sodium (THERATEARS) 0.25 % drop Apply  to eye. No current facility-administered medications on file prior to visit. Past Medical History:  
Diagnosis Date  Abdominal bloating 8/15/2017  Adult onset hypothyroidism 8/15/2017  Anxiety 8/15/2017  ARMD (age related macular degeneration) 8/15/2017  
 ASHD (arteriosclerotic heart disease) 8/15/2017  Cancer ([facility] [address] Colon and Skin  Cervical strain 8/15/2017  Dyslipidemia 8/15/2017  Dysphagia 8/15/2017  Family history of colon cancer 8/15/2017  Frequent UTI 8/15/2017  H/O basal cell carcinoma excision 8/15/2017  Herpes zoster without complication 8/47/4072  History of TIA (transient ischemic attack) 8/15/2017  
 [place] (hard of hearing) 8/15/2017  Hypertension  Leg cramps 8/15/2017  Loss of hair 8/15/2017  Medial meniscus tear 8/15/2017  Mild cognitive impairment with memory loss 8/15/2017  Near syncope 8/15/2017  Osteoarthritis of both knees 8/15/2017  Osteoporosis 8/15/2017  Other ill-defined conditions(799.89)   
 hypercholesterolemia  Thyroid disease  Urinary incontinence 8/15/2017  Villous adenoma 8/15/2017  Vitamin D deficiency 8/15/2017 Family History Problem Relation Age of Onset  Cancer Mother   
  stomach  Dementia Mother  Hypertension Mother  Hypertension Father  Cancer Sister Colon Cancer  Heart Disease Brother  Diabetes Brother  Cancer Brother Colon Cancer  Heart Disease Son   
 
Social History Substance Use Topics  Smoking status: Never Smoker  Smokeless tobacco: Never Used  Alcohol use No  
 
/70  Pulse 65  Ht 5' 5\" (1.651 m)  Wt 127 lb (57.6 kg)  LMP  (LMP Unknown)  SpO2 98%  BMI 21.13 kg/m2 Physical Exam 
 Constitutional: Oriented to person, place, and time, appears well-developed and well-nourished. No distress. HENT:  
Head: Normocephalic and atraumatic. Mouth/Throat: Oropharynx is clear and moist. No oropharyngeal exudate. Eyes: Conjunctivae and EOM are normal. Pupils are equal, round, and reactive to light. No scleral icterus. Neck: Normal range of motion. Neck supple. No thyromegaly present. Cardiovascular: Normal rate, regular rhythm and normal heart sounds. Musculoskeletal: Normal range of motion, exhibits no edema, tenderness or deformity. Lymphadenopathy: no cervical adenopathy. Neurological: Alert and oriented to person, place, and time. Normal strength and normal reflexes. Displays no atrophy and no tremor. No cranial nerve deficit or sensory deficit. Exhibits normal muscle tone. Displays a negative Romberg sign, no seizure activity. Coordination normal, gait normal.  
No Babinski's sign on the right side. No Babinski's sign on the left side. Speech, language and mentation are normal 
Visual fields are full to confrontation, funduscopic exam reveals flat discs, the retina and vasculature are normal  
Skin: Skin is warm and dry. No rash noted, not diaphoretic. No erythema. Psychiatric: Normal mood and affect,  behavior is normal. Judgment and thought content normal.  
Vitals reviewed. ASSESSMENT and PLAN 
SPELLS These spells do not sound vertiginous, they do not sound like they are associated with cerebral ischemia, I do not think that ordering scans of her brain or carotid arteries is going to be particularly helpful. It might be worthwhile to rule out cardiac arrhythmia which statistically is probably the most likely bad actor. I will refer her to Dr. Xenia Huston office for consideration of a 72 hour cardiac monitor.   This is normal I recommend she stay on her current medications and continue to put one foot in front of the other to see whether this continues to be a mild nuisance problem or whether it declares itself as some other issue. HYPERTENSION Stroke risk, stable, managed by primary care HYPOTHYROIDISM Stable, managed by primary care If you have questions, please do not hesitate to call me. I look forward to following Ms. Fang along with you. Sincerely, Amber Jimenez MD

## 2018-08-14 NOTE — MR AVS SNAPSHOT
[address], 
[address], [address] [facility] 83. 
[phone] Patient: Daniel Johnson MRN: [id number] Visit Information Date & Time Provider Department Dept. Phone Encounter #  
 2018  2:00 PM Jelly Herzog MD Neurology Clinic at [facility] [phone] [id number] Follow-up Instructions Return if symptoms worsen or fail to improve. Your Appointments 2018  8:00 AM  
LAB with LAB ONLY  
[facility] ([facility]) Appt Note: cpe labs [address] [phone] [address] [address]  
  
    
 2018  9:00 AM  
Complete Physical with MD [address] ([facility]) Appt Note: cpe review [address] [phone] [address] [facility] [phone] Upcoming Health Maintenance Date Due  
 GLAUCOMA SCREENING Q2Y 1994 MEDICARE YEARLY EXAM 3/14/2018 Influenza Age 5 to Adult 2018 DTaP/Tdap/Td series (2 - Td) 2021 Allergies as of 2018  Review Complete On: 2018 By: Nimo Beard LPN Severity Noted Reaction Type Reactions Statins-hmg-coa Reductase Inhibitors  08/15/2017    Unknown (comments) Current Immunizations  Never Reviewed Name Date Influenza High Dose Vaccine PF 10/25/2017 Influenza Vaccine 10/21/2016, 10/1/2016, 10/12/2015, 10/9/2014, 10/9/2014, 2013, 10/16/2012, 10/4/2011 Pneumococcal Conjugate (PCV-13) 2015 Pneumococcal Vaccine (Unspecified Type) 3/1/2001 Tdap 2011 Zoster Vaccine, Live 2008 Not reviewed this visit You Were Diagnosed With   
  
 Codes Comments Episodic lightheadedness    -  Primary ICD-10-CM: M08 ICD-9-CM: 780.4 Anxiety     ICD-10-CM: F41.9 ICD-9-CM: 300.00 Vitals BP Pulse Height(growth percentile) Weight(growth percentile) LMP SpO2  
 138/70 65 5' 5\" (1.651 m) 127 lb (57.6 kg) (LMP Unknown) 98% BMI OB Status Smoking Status 21.13 kg/m2 Postmenopausal Never Smoker Vitals History BMI and BSA Data Body Mass Index Body Surface Area  
 21.13 kg/m 2 1.63 m 2 Preferred Pharmacy Pharmacy Name Phone [facility] [address], [address] [place] [address] Avenue [phone] Your Updated Medication List  
  
   
This list is accurate as of 8/14/18  2:35 PM.  Always use your most recent med list. amLODIPine 2.5 mg tablet Commonly known as:  [place] Colon Take 1 Tab by mouth two (2) times a day. aspirin delayed-release 81 mg tablet Take 81 mg by mouth daily. levocetirizine 5 mg tablet Commonly known as:  Gatha Levers Take 1 Tab by mouth nightly. metoprolol succinate 25 mg XL tablet Commonly known as:  TOPROL-XL Take 1 Tab by mouth daily. PRESERVISION AREDS 2 PO Take 1 Tab by mouth two (2) times a day. * synthroid 50 mcg tablet Generic drug:  levothyroxine Take 50 mcg by mouth two (2) times a week. Sat/Sun  
  
 * levothyroxine 75 mcg tablet Commonly known as:  SYNTHROID Take  by mouth. 1 tab M->F  
  
 THERATEARS 0.25 % Drop Generic drug:  carboxymethylcellulose sodium Apply  to eye. VITAMIN B-12 500 mcg tablet Generic drug:  cyanocobalamin Take 1,000 mcg by mouth daily. VITAMIN D3 PO Take 1,000 Units by mouth daily. * Notice: This list has 2 medication(s) that are the same as other medications prescribed for you. Read the directions carefully, and ask your doctor or other care provider to review them with you. We Performed the Following REFERRAL TO CARDIOLOGY [EXC39 Custom]  Comments:  
 Patient with spells of lightheadedness and \"not feeling right\", normal neurologic exam, consider 72 hour cardiac monitor Follow-up Instructions Return if symptoms worsen or fail to improve. To-Do List   
 09/04/2018 8:40 AM  
  Appointment with [facility] [address] at [address] [place] [address] ([phone]) Shower or bathe using soap and water. Do not use deodorant, powder, perfumes, or lotion the day of your exam.  If your prior mammograms were not performed at [place] [address] please bring films with you or forward prior images 2 days before your procedure. Check in at registration 15min before your appointment time unless you were instructed to do otherwise. A script is not necessary, but if you have one, please bring it on the day of the mammogram or have it faxed to the department. You are responsible for finding a method of transportation to your appointment. If you don't have transportation, please reschedule your appointment at least 24 hours in advance. [facility] [phone] [facility]  [phone] [address] [place] [address]  [phone]  [phone] [facility] 1150 [facility] [phone] Referral Information Referral ID Referred By Referred To  
  
 [id number] Walter Fernandes MD   
   [address] [address] Phone: [phone] Fax: [phone] Visits Status Start Date End Date 1 New Request 8/14/18 8/14/19 If your referral has a status of pending review or denied, additional information will be sent to support the outcome of this decision. Introducing [facility] HE[facility]! [facility] introduces Continuing Education Records & Resources patient portal. Now you can access parts of your medical record, email your doctor's office, and request medication refills online. 1. In your internet browser, go to [email or web address] 2. Click on the First Time User? Click Here link in the Sign In box. You will see the New Member Sign Up page. 3. Enter your PayLease Access Code exactly as it appears below. You will not need to use this code after youve completed the sign-up process. If you do not sign up before the expiration date, you must request a new code. · PayLease Access Code: [email or web address] Expires: 10/16/2018  4:58 PM 
 
4. Enter the last four digits of your Social Security Number (xxxx) and Date of Birth (mm/dd/yyyy) as indicated and click Submit. You will be taken to the next sign-up page. 5. Create a [employer]Care Onsitet ID. This will be your PayLease login ID and cannot be changed, so think of one that is secure and easy to remember. 6. Create a PayLease password. You can change your password at any time. 7. Enter your Password Reset Question and Answer. This can be used at a later time if you forget your password. 8. Enter your e-mail address. You will receive e-mail notification when new information is available in [address] 9. Click Sign Up. You can now view and download portions of your medical record. 10. Click the Download Summary menu link to download a portable copy of your medical information. If you have questions, please visit the Frequently Asked Questions section of the PayLease website. Remember, PayLease is NOT to be used for urgent needs. For medical emergencies, dial 911. Now available from your iPhone and Android! Please provide this summary of care documentation to your next provider. Your primary care clinician is listed as Orville Curry. If you have any questions after today's visit, please call [phone].

## 2018-08-14 NOTE — PROGRESS NOTES
HISTORY OF PRESENT ILLNESS  Haroldo Delacruz is a 80 y.o. female. HPI Comments: Benedicto Duenas is an 80-year-old  female who comes in today with spells of not feeling right. She states that she simply had the unusual sensation of not feeling right, she specifically denies lightheadedness or chest discomfort problems with weakness in an arm or a leg. She is 80years old and pretty good health. She is here with her daughter-in-law today. She denies visual changes or numbness associated with any of these. Dizziness    The history is provided by the patient and relative (DAUGHTER). This is a new problem. Associated symptoms include dizziness. Review of Systems   Constitutional:        Review of systems is positive for some hearing loss, some memory loss and some occasional muscle pain. Complete review of systems done although is negative. Neurological: Positive for dizziness. Current Outpatient Prescriptions on File Prior to Visit   Medication Sig Dispense Refill    metoprolol succinate (TOPROL-XL) 25 mg XL tablet Take 1 Tab by mouth daily. 90 Tab 3    levothyroxine (SYNTHROID) 75 mcg tablet Take  by mouth. 1 tab M->F      VIT C/E/ZN/COPPR/LUTEIN/ZEAXAN (PRESERVISION AREDS 2 PO) Take 1 Tab by mouth two (2) times a day.  CHOLECALCIFEROL, VITAMIN D3, (VITAMIN D3 PO) Take 1,000 Units by mouth daily.  levothyroxine (SYNTHROID) 50 mcg tablet Take 50 mcg by mouth two (2) times a week. Sat/Sun      aspirin delayed-release 81 mg tablet Take 81 mg by mouth daily.  cyanocobalamin (VITAMIN B-12) 500 mcg tablet Take 1,000 mcg by mouth daily.  levocetirizine (XYZAL) 5 mg tablet Take 1 Tab by mouth nightly. 30 Tab 0    amLODIPine (NORVASC) 2.5 mg tablet Take 1 Tab by mouth two (2) times a day. 180 Tab 3    carboxymethylcellulose sodium (THERATEARS) 0.25 % drop Apply  to eye. No current facility-administered medications on file prior to visit.       Past Medical History: Diagnosis Date    Abdominal bloating 8/15/2017    Adult onset hypothyroidism 8/15/2017    Anxiety 8/15/2017    ARMD (age related macular degeneration) 8/15/2017    ASHD (arteriosclerotic heart disease) 8/15/2017    Cancer ([facility] [address]     Colon and Skin    Cervical strain 8/15/2017    Dyslipidemia 8/15/2017    Dysphagia 8/15/2017    Family history of colon cancer 8/15/2017    Frequent UTI 8/15/2017    H/O basal cell carcinoma excision 8/15/2017    Herpes zoster without complication 3/85/4005    History of TIA (transient ischemic attack) 8/15/2017    [place] (hard of hearing) 8/15/2017    Hypertension     Leg cramps 8/15/2017    Loss of hair 8/15/2017    Medial meniscus tear 8/15/2017    Mild cognitive impairment with memory loss 8/15/2017    Near syncope 8/15/2017    Osteoarthritis of both knees 8/15/2017    Osteoporosis 8/15/2017    Other ill-defined conditions(799.89)     hypercholesterolemia    Thyroid disease     Urinary incontinence 8/15/2017    Villous adenoma 8/15/2017    Vitamin D deficiency 8/15/2017     Family History   Problem Relation Age of Onset    Cancer Mother      stomach    Dementia Mother     Hypertension Mother     Hypertension Father     Cancer Sister      Colon Cancer    Heart Disease Brother     Diabetes Brother     Cancer Brother      Colon Cancer    Heart Disease Son      Social History   Substance Use Topics    Smoking status: Never Smoker    Smokeless tobacco: Never Used    Alcohol use No     /70  Pulse 65  Ht 5' 5\" (1.651 m)  Wt 127 lb (57.6 kg)  LMP  (LMP Unknown)  SpO2 98%  BMI 21.13 kg/m2    Physical Exam  Constitutional: Oriented to person, place, and time, appears well-developed and well-nourished. No distress. HENT:   Head: Normocephalic and atraumatic. Mouth/Throat: Oropharynx is clear and moist. No oropharyngeal exudate. Eyes: Conjunctivae and EOM are normal. Pupils are equal, round, and reactive to light. No scleral icterus. Neck: Normal range of motion. Neck supple. No thyromegaly present. Cardiovascular: Normal rate, regular rhythm and normal heart sounds. Musculoskeletal: Normal range of motion, exhibits no edema, tenderness or deformity. Lymphadenopathy: no cervical adenopathy. Neurological: Alert and oriented to person, place, and time. Normal strength and normal reflexes. Displays no atrophy and no tremor. No cranial nerve deficit or sensory deficit. Exhibits normal muscle tone. Displays a negative Romberg sign, no seizure activity. Coordination normal, gait normal.   No Babinski's sign on the right side. No Babinski's sign on the left side. Speech, language and mentation are normal  Visual fields are full to confrontation, funduscopic exam reveals flat discs, the retina and vasculature are normal   Skin: Skin is warm and dry. No rash noted, not diaphoretic. No erythema. Psychiatric: Normal mood and affect,  behavior is normal. Judgment and thought content normal.   Vitals reviewed. ASSESSMENT and PLAN  SPELLS  These spells do not sound vertiginous, they do not sound like they are associated with cerebral ischemia, I do not think that ordering scans of her brain or carotid arteries is going to be particularly helpful. It might be worthwhile to rule out cardiac arrhythmia which statistically is probably the most likely bad actor. I will refer her to Dr. Philly Perez office for consideration of a 72 hour cardiac monitor. This is normal I recommend she stay on her current medications and continue to put one foot in front of the other to see whether this continues to be a mild nuisance problem or whether it declares itself as some other issue.   HYPERTENSION  Stroke risk, stable, managed by primary care  HYPOTHYROIDISM  Stable, managed by primary care

## 2018-08-16 ENCOUNTER — DOCUMENTATION ONLY (OUTPATIENT)
Dept: NEUROLOGY | Age: 83
End: 2018-08-16

## 2018-08-17 ENCOUNTER — OFFICE VISIT (OUTPATIENT)
Dept: INTERNAL MEDICINE CLINIC | Age: 83
End: 2018-08-17

## 2018-08-17 VITALS
RESPIRATION RATE: 19 BRPM | WEIGHT: 126.8 LBS | DIASTOLIC BLOOD PRESSURE: 64 MMHG | SYSTOLIC BLOOD PRESSURE: 134 MMHG | HEART RATE: 70 BPM | HEIGHT: 65 IN | OXYGEN SATURATION: 96 % | TEMPERATURE: 98.6 F | BODY MASS INDEX: 21.13 KG/M2

## 2018-08-17 DIAGNOSIS — R42 DIZZINESS: Primary | ICD-10-CM

## 2018-08-17 NOTE — PROGRESS NOTES
Subjective:   Dionicio Mark is a 80 y.o. female      Chief Complaint   Patient presents with    Hypertension     follow up        History of present illness:  Ms. Rosemary Collins returns today having noted an elevated blood pressure this morning by her blood pressure cuff at home. Here her blood pressure is normal, however. She continues to note issues with the \"spells\" and sensation of feeling funny in her head, which tends to occur when she first gets up in the morning and lasts for most of the morning and sometimes not even resolved for the rest of the day. She recently saw the neurologist, who felt that she could have a cardiac issue, including atrial fibrillation. As a result we're going to refer her for Holter monitor. She is already scheduled to see a cardiologist later this month. If the Holter monitor is negative I think we should proceed with stress testing. The cardiologist will have to decide about whether she would benefit from a tilt test or not. Her blood pressure is fine today here. Her heart rhythm is regular on auscultation. She's had recent labs and other studies, which have been negative. I think we'll proceed with the cardiac studies at this point. Once we see the Holter monitor if it's negative we can set her up for the stress test.  She's already scheduled for a comprehensive examination in early September.         Patient Active Problem List    Diagnosis Date Noted    Adult onset hypothyroidism 08/15/2017     Priority: 1 - One    Dyslipidemia 08/15/2017     Priority: 1 - One    Hypertension 08/15/2017     Priority: 1 - One    Anxiety 08/15/2017     Priority: 2 - Two    Mild cognitive impairment with memory loss 08/15/2017     Priority: 2 - Two    DJD (degenerative joint disease) 08/29/2017     Priority: 3 - Three    Abdominal bloating 08/15/2017     Priority: 3 - Three    ASHD (arteriosclerotic heart disease) 08/15/2017     Priority: 3 - Three    History of TIA (transient ischemic attack) 08/15/2017     Priority: 3 - Three    Near syncope 08/15/2017     Priority: 3 - Three    ARMD (age related macular degeneration) 08/15/2017     Priority: 4 - Four    Frequent UTI 08/15/2017     Priority: 4 - Four    Loss of hair 08/15/2017     Priority: 4 - Four    Urinary incontinence 08/15/2017     Priority: 4 - Four    Villous adenoma 08/15/2017     Priority: 4 - Four    Family history of colon cancer 08/15/2017     Priority: 5 - Five    H/O basal cell carcinoma excision 08/15/2017     Priority: 5 - Five    Herpes zoster without complication 64/66/0030     Priority: 5 - Five    [place] (hard of hearing) 08/15/2017     Priority: 5 - Five    Leg cramps 08/15/2017     Priority: 5 - Five    Medial meniscus tear 08/15/2017     Priority: 5 - Five    Osteoporosis 08/15/2017     Priority: 5 - Five    Vitamin D deficiency 08/15/2017     Priority: 5 - Five    PE (physical exam), annual 11/29/2017      Past Surgical History:   Procedure Laterality Date    ABDOMEN SURGERY PROC UNLISTED      colon resection    HX CATARACT REMOVAL Bilateral     HX HEENT      partial thyroidectomy    HX HEENT  02/02/2017    redo neck exploration w/completion thyroidectomy    HX OTHER SURGICAL      skin cancer removed    HX TONSILLECTOMY      HX UROLOGICAL      Contigen injections    MI COLONOSCOPY FLX DX W/COLLJ SPEC WHEN PFRMD  3/13/2013           Allergies   Allergen Reactions    Statins-Hmg-Coa Reductase Inhibitors Unknown (comments)      Family History   Problem Relation Age of Onset    Cancer Mother      stomach    Dementia Mother     Hypertension Mother     Hypertension Father     Cancer Sister      Colon Cancer    Heart Disease Brother     Diabetes Brother     Cancer Brother      Colon Cancer    Heart Disease Son       Social History     Social History    Marital status:      Spouse name: N/A    Number of children: N/A    Years of education: N/A     Occupational History    Not on file. Social History Main Topics    Smoking status: Never Smoker    Smokeless tobacco: Never Used    Alcohol use No    Drug use: No    Sexual activity: Not on file     Other Topics Concern    Not on file     Social History Narrative     Outpatient Prescriptions Marked as Taking for the 8/17/18 encounter (Office Visit) with Irina Steel MD   Medication Sig Dispense Refill    metoprolol succinate (TOPROL-XL) 25 mg XL tablet Take 1 Tab by mouth daily. 90 Tab 3    levocetirizine (XYZAL) 5 mg tablet Take 1 Tab by mouth nightly. 30 Tab 0    levothyroxine (SYNTHROID) 75 mcg tablet Take  by mouth. 1 tab M->F      amLODIPine (NORVASC) 2.5 mg tablet Take 1 Tab by mouth two (2) times a day. 180 Tab 3    VIT C/E/ZN/COPPR/LUTEIN/ZEAXAN (PRESERVISION AREDS 2 PO) Take 1 Tab by mouth two (2) times a day.  carboxymethylcellulose sodium (THERATEARS) 0.25 % drop Apply  to eye.  CHOLECALCIFEROL, VITAMIN D3, (VITAMIN D3 PO) Take 1,000 Units by mouth daily.  levothyroxine (SYNTHROID) 50 mcg tablet Take 50 mcg by mouth two (2) times a week. Sat/Sun      aspirin delayed-release 81 mg tablet Take 81 mg by mouth daily.  cyanocobalamin (VITAMIN B-12) 500 mcg tablet Take 1,000 mcg by mouth daily. Review of Systems              Constitutional:  She denies fever, weight loss, sweats or fatigue. HEENT:  No blurred or double vision, headache or dizziness. No difficulty with swallowing, taste, speech or smell. Respiratory:  No cough, wheezing or shortness of breath. No sputum production. Cardiac:  Denies chest pain, palpitations, unexplained indigestion, syncope, edema, PND or orthopnea. GI:  No changes in bowel movements, no abdominal pain, no bloating, anorexia, nausea, vomiting or heartburn. :  No frequency or dysuria. Denies incontinence. Extremities:  No joint pain, stiffness or swelling. Skin:  No recent rashes or mole changes.   Neurological:  No numbness, tingling, burning paresthesias or loss of motor strength. No syncope, dizziness, frequent headaches or memory loss. Objective:     Vitals:    08/17/18 1458   BP: 134/64   Pulse: 70   Resp: 19   Temp: 98.6 °F (37 °C)   TempSrc: Oral   SpO2: 96%   Weight: 126 lb 12.8 oz (57.5 kg)   Height: 5' 5\" (1.651 m)   PainSc:   0 - No pain       Body mass index is 21.1 kg/(m^2). Physical Examination:              General Appearance:  Well-developed, well-nourished, no acute  distress. HEENT:     Ears:  The TMs and ear canals were clear. Eyes:  The pupillary responses were normal.  Extraocular muscle function intact. Lids and conjunctiva not injected. Neck:  Supple without thyromegaly or adenopathy. No JVD noted. Lungs:  Clear to auscultation and percussion. Cardiac:  Regular rate and rhythm without murmur. GI: nontender w/o mass. Normal BS's. Extremities:  No clubbing, cyanosis or edema. Skin:  No rash or unusual mole changes noted. Neurological:  Grossly normal.            Assessment/Plan:   Impressions:      ICD-10-CM ICD-9-CM    1. Dizziness R42 780.4 CARDIAC HOLTER MONITOR, 24 HOURS        Plan:  1. Continue present meds  2. Discussed lifestyle modifications including Na restriction, low carb/fat diet and exercise (at least a walking program). Follow-up Disposition:  Return for If sxs worsen or fail to improve; or as scheduled. Orders Placed This Encounter    CARDIAC HOLTER MONITOR, 24 HOURS     Standing Status:   Future     Standing Expiration Date:   2/17/2019     Order Specific Question:   Reason for Exam:     Answer:   recurrent dizziness.   R/O A Fib       Opal Joseph MD

## 2018-08-17 NOTE — PROGRESS NOTES
Chief Complaint   Patient presents with    Hypertension     follow up     1. Have you been to the ER, urgent care clinic since your last visit? No  Hospitalized since your last visit? No     2. Have you seen or consulted any other health care providers outside of the [address] [place] [address] since your last visit?    No

## 2018-08-20 ENCOUNTER — HOSPITAL ENCOUNTER (OUTPATIENT)
Dept: NON INVASIVE DIAGNOSTICS | Age: 83
Discharge: HOME | End: 2018-08-20
Attending: INTERNAL MEDICINE
Payer: MEDICARE

## 2018-08-20 DIAGNOSIS — R42 DIZZINESS: ICD-10-CM

## 2018-08-20 PROCEDURE — 93225 XTRNL ECG REC<48 HRS REC: CPT

## 2018-08-22 ENCOUNTER — TELEPHONE (OUTPATIENT)
Dept: INTERNAL MEDICINE CLINIC | Age: 83
End: 2018-08-22

## 2018-08-22 ENCOUNTER — LAB ONLY (OUTPATIENT)
Dept: INTERNAL MEDICINE CLINIC | Age: 83
End: 2018-08-22

## 2018-08-22 DIAGNOSIS — Z00.00 PE (PHYSICAL EXAM), ANNUAL: Primary | ICD-10-CM

## 2018-08-22 DIAGNOSIS — E03.8 ADULT ONSET HYPOTHYROIDISM: ICD-10-CM

## 2018-08-22 DIAGNOSIS — E78.5 DYSLIPIDEMIA: ICD-10-CM

## 2018-08-22 LAB
ALBUMIN SERPL-MCNC: 4.3 G/DL (ref 3.9–5.4)
ALKALINE PHOS POC: 82 U/L (ref 38–126)
ALT SERPL-CCNC: 33 U/L (ref 9–52)
AST SERPL-CCNC: 23 U/L (ref 14–36)
BACTERIA UA POCT, BACTPOCT: NORMAL
BILIRUB UR QL STRIP: NEGATIVE
BUN BLD-MCNC: 12 MG/DL (ref 7–17)
CALCIUM BLD-MCNC: 10 MG/DL (ref 8.4–10.2)
CASTS UA POCT: NORMAL
CHLORIDE BLD-SCNC: 99 MMOL/L (ref 98–107)
CHOLEST SERPL-MCNC: 237 MG/DL (ref 0–200)
CLUE CELLS, CLUEPOCT: NEGATIVE
CO2 POC: 30 MMOL/L (ref 22–32)
CREAT BLD-MCNC: 0.5 MG/DL (ref 0.7–1.2)
CRYSTALS UA POCT, CRYSPOCT: NEGATIVE
EGFR (POC): 85.8
EPITHELIAL CELLS POCT: NORMAL
GLUCOSE POC: 96 MG/DL (ref 65–105)
GLUCOSE UR-MCNC: NEGATIVE MG/DL
GRAN# POC: 4.1 K/UL (ref 2–7.8)
GRAN% POC: 64.6 % (ref 37–92)
HCT VFR BLD CALC: 42.6 % (ref 37–51)
HDLC SERPL-MCNC: 98 MG/DL (ref 35–130)
HGB BLD-MCNC: 14.3 G/DL (ref 12–18)
IRON POC: 126 UG/DL (ref 37–170)
IRON SATURATION POC: 28 % (ref 15–55)
KETONES P FAST UR STRIP-MCNC: NEGATIVE MG/DL
LDL CHOLESTEROL POC: 113 MG/DL (ref 0–130)
LY# POC: 1.9 K/UL (ref 0.6–4.1)
LY% POC: 31.4 % (ref 10–58.5)
MCH RBC QN: 29.7 PG (ref 26–32)
MCHC RBC-ENTMCNC: 33.5 G/DL (ref 30–36)
MCV RBC: 89 FL (ref 80–97)
MID #, POC: 0.2 K/UL (ref 0–1.8)
MID% POC: 4 % (ref 0.1–24)
MUCUS UA POCT, MUCPOCT: NORMAL
PH UR STRIP: 6 [PH] (ref 5–7)
PLATELET # BLD: 250 K/UL (ref 140–440)
POTASSIUM SERPL-SCNC: 4 MMOL/L (ref 3.6–5)
PROT SERPL-MCNC: 7.6 G/DL (ref 6.3–8.2)
PROT UR QL STRIP: NORMAL
RBC # BLD: 4.8 M/UL (ref 4.2–6.3)
RBC UA POCT, RBCPOCT: NORMAL
SODIUM SERPL-SCNC: 138 MMOL/L (ref 137–145)
SP GR UR STRIP: 1.02 (ref 1.01–1.02)
TCHOL/HDL RATIO (POC): 2.4 (ref 0–4)
TIBC POC: 449 UG/DL (ref 265–497)
TOTAL BILIRUBIN POC: 1 MG/DL (ref 0.2–1.3)
TRICH UA POCT, TRICHPOC: NEGATIVE
TRIGL SERPL-MCNC: 130 MG/DL (ref 0–200)
TSH BLD-ACNC: 5.11 UIU/ML (ref 0.4–4.2)
UA UROBILINOGEN AMB POC: NORMAL (ref 0.2–1)
URINALYSIS CLARITY POC: CLEAR
URINALYSIS COLOR POC: YELLOW
URINE BLOOD POC: NORMAL
URINE CULT COMMENT, POCT: NORMAL
URINE LEUKOCYTES POC: NEGATIVE
URINE NITRITES POC: NEGATIVE
VITAMIN D POC: 48.8 NG/ML (ref 30–96)
VLDLC SERPL CALC-MCNC: 26 MG/DL
WBC # BLD: 6.2 K/UL (ref 4.1–10.9)
WBC UA POCT, WBCPOCT: NORMAL
YEAST UA POCT, YEASTPOC: NEGATIVE

## 2018-08-22 NOTE — TELEPHONE ENCOUNTER
Patient called stating that she was not feeling well and her energy level was low/no energy. Patient advised that per Dr Katie Bejarano stop her Metoprolol and let us know how she is doing in a couple of days.

## 2018-08-23 LAB — T4 FREE SERPL-MCNC: 1.83 NG/DL (ref 0.82–1.77)

## 2018-08-23 NOTE — PROGRESS NOTES
Thyroid appears abnormal.  Need repeat FT4 and TSH.   Day she comes in to check don't take thyroid med that morning

## 2018-08-24 ENCOUNTER — LAB ONLY (OUTPATIENT)
Dept: INTERNAL MEDICINE CLINIC | Age: 83
End: 2018-08-24

## 2018-08-24 DIAGNOSIS — E03.8 ADULT ONSET HYPOTHYROIDISM: Primary | ICD-10-CM

## 2018-08-25 LAB
T4 FREE SERPL-MCNC: 1.65 NG/DL (ref 0.82–1.77)
TSH SERPL DL<=0.005 MIU/L-ACNC: 6.07 UIU/ML (ref 0.45–4.5)

## 2018-08-28 ENCOUNTER — OFFICE VISIT (OUTPATIENT)
Dept: CARDIOLOGY CLINIC | Age: 83
End: 2018-08-28

## 2018-08-28 VITALS
DIASTOLIC BLOOD PRESSURE: 68 MMHG | SYSTOLIC BLOOD PRESSURE: 126 MMHG | BODY MASS INDEX: 20.98 KG/M2 | HEART RATE: 84 BPM | OXYGEN SATURATION: 98 % | HEIGHT: 65 IN | WEIGHT: 125.9 LBS

## 2018-08-28 DIAGNOSIS — I49.1 PAC (PREMATURE ATRIAL CONTRACTION): ICD-10-CM

## 2018-08-28 DIAGNOSIS — I47.1 SVT (SUPRAVENTRICULAR TACHYCARDIA) (HCC): ICD-10-CM

## 2018-08-28 DIAGNOSIS — R42 LIGHTHEADEDNESS: ICD-10-CM

## 2018-08-28 DIAGNOSIS — E78.5 DYSLIPIDEMIA: ICD-10-CM

## 2018-08-28 DIAGNOSIS — I10 ESSENTIAL HYPERTENSION: Primary | ICD-10-CM

## 2018-08-28 DIAGNOSIS — R00.2 PALPITATIONS: ICD-10-CM

## 2018-08-28 DIAGNOSIS — I95.1 ORTHOSTATIC HYPOTENSION: ICD-10-CM

## 2018-08-28 RX ORDER — DILTIAZEM HYDROCHLORIDE 120 MG/1
120 CAPSULE, COATED, EXTENDED RELEASE ORAL DAILY
Qty: 30 CAP | Refills: 3 | Status: SHIPPED | OUTPATIENT
Start: 2018-08-28 | End: 2018-10-16 | Stop reason: SDUPTHER

## 2018-08-28 NOTE — PROGRESS NOTES
8/28/2018 11:29 AM 
 
 
Subjective:  
 
Evelyne Fang is seen in office today for further evaluation. Has been c/o palpitations, lightheadedness especially late night. She denies chest pain, chest pressure/discomfort, dyspnea, syncope, fatigue, orthopnea, paroxysmal nocturnal dyspnea, exertional chest pressure/discomfort, claudication, lower extremity edema. Recently had Holter monitor at [facility]. Visit Vitals  /68 (BP 1 Location: Left arm, BP Patient Position: Standing)  Pulse 84  
 Ht 5' 5\" (1.651 m)  Wt 125 lb 14.4 oz (57.1 kg)  LMP  (LMP Unknown)  SpO2 98%  BMI 20.95 kg/m2 Current Outpatient Prescriptions Medication Sig  
 dilTIAZem CD (CARDIZEM CD) 120 mg ER capsule Take 1 Cap by mouth daily.  levothyroxine (SYNTHROID) 75 mcg tablet Take  by mouth. 1 tab M->F  VIT C/E/ZN/COPPR/LUTEIN/ZEAXAN (PRESERVISION AREDS 2 PO) Take 1 Tab by mouth two (2) times a day.  carboxymethylcellulose sodium (THERATEARS) 0.25 % drop Apply 1 Drop to eye two (2) times a day.  CHOLECALCIFEROL, VITAMIN D3, (VITAMIN D3 PO) Take 1,000 Units by mouth daily.  levothyroxine (SYNTHROID) 50 mcg tablet Take 50 mcg by mouth two (2) times a week. Sat/Sun  
 aspirin delayed-release 81 mg tablet Take 81 mg by mouth daily.  cyanocobalamin (VITAMIN B-12) 500 mcg tablet Take 1,000 mcg by mouth daily.  levocetirizine (XYZAL) 5 mg tablet Take 1 Tab by mouth nightly. No current facility-administered medications for this visit. Objective:  
  
Visit Vitals  /68 (BP 1 Location: Left arm, BP Patient Position: Standing)  Pulse 84  
 Ht 5' 5\" (1.651 m)  Wt 125 lb 14.4 oz (57.1 kg)  SpO2 98%  BMI 20.95 kg/m2 Data Review: 
 
EKG: Normal sinus rhythm, no acute st/t changes Reviewed and/or ordered active problem list, medication list tests Past Medical History:  
Diagnosis Date  Abdominal bloating 8/15/2017  Adult onset hypothyroidism 8/15/2017  Anxiety 8/15/2017  ARMD (age related macular degeneration) 8/15/2017  
 ASHD (arteriosclerotic heart disease) 8/15/2017  Cancer ([facility] [address] Colon and Skin  Cervical strain 8/15/2017  Dyslipidemia 8/15/2017  Dysphagia 8/15/2017  Family history of colon cancer 8/15/2017  Frequent UTI 8/15/2017  H/O basal cell carcinoma excision 8/15/2017  Herpes zoster without complication 7/72/5899  History of TIA (transient ischemic attack) 8/15/2017  
 Kaibab (hard of hearing) 8/15/2017  Hypertension  Leg cramps 8/15/2017  Loss of hair 8/15/2017  Medial meniscus tear 8/15/2017  Mild cognitive impairment with memory loss 8/15/2017  Near syncope 8/15/2017  Osteoarthritis of both knees 8/15/2017  Osteoporosis 8/15/2017  Other ill-defined conditions(799.89)   
 hypercholesterolemia  Thyroid disease  Urinary incontinence 8/15/2017  Villous adenoma 8/15/2017  Vitamin D deficiency 8/15/2017 Past Surgical History:  
Procedure Laterality Date  ABDOMEN SURGERY PROC UNLISTED    
 colon resection  HX CATARACT REMOVAL Bilateral   
 HX HEENT    
 partial thyroidectomy  HX HEENT  02/02/2017  
 redo neck exploration w/completion thyroidectomy  HX OTHER SURGICAL    
 skin cancer removed  HX TONSILLECTOMY  HX UROLOGICAL Contigen injections  WI COLONOSCOPY FLX DX W/COLLJ SPEC WHEN PFRMD  3/13/2013 Allergies Allergen Reactions  Statins-Hmg-Coa Reductase Inhibitors Unknown (comments) Family History Problem Relation Age of Onset  Cancer Mother   
  stomach  Dementia Mother  Hypertension Mother  Hypertension Father  Cancer Sister Colon Cancer  Heart Disease Brother  Diabetes Brother  Cancer Brother Colon Cancer  Heart Disease Son   
  
Social History Social History  Marital status:    Spouse name: N/A  
 Number of children: N/A  
  Years of education: N/A Occupational History  Not on file. Social History Main Topics  Smoking status: Never Smoker  Smokeless tobacco: Never Used  Alcohol use No  
 Drug use: No  
 Sexual activity: Not on file Other Topics Concern  Not on file Social History Narrative Review of Systems  
 
General: Not Present- Anorexia, Chills, Dietary Changes, Fatigue, Fever, Medication Changes, Night Sweats, Weight Gain > 10lbs. and Weight Loss > 10lbs. Rachel Gearing Skin: Not Present- Bruising and Excessive Sweating. HEENT: Not Present- Headache, Visual Loss and Vertigo. Respiratory: Not Present- Cough, Decreased Exercise Tolerance, Difficulty Breathing, Snoring and Wheezing. Cardiovascular: Not Present- Chest Pain, Claudications, Difficulty Breathing On Exertion, Edema, Night Cramps, Orthopnea, Paroxysmal Nocturnal Dyspnea, Shortness of breath and Swelling of Extremities. Gastrointestinal: Not Present- Black, Tarry Stool, Bloody Stool, Diarrhea, Hematemesis, Rectal Bleeding and Vomiting. Musculoskeletal: Not Present- Muscle Pain and Muscle Weakness. Neurological: Not Present- Dizziness. Psychiatric: Not Present- Depression. Endocrine: Not Present- Cold Intolerance, Heat Intolerance and Thyroid Problems. Hematology: Not Present- Abnormal Bleeding, Anemia, Blood Clots and Easy Bruising. 
 
  
Physical Exam  
The physical exam findings are as follows: 
 
  
General  
Mental Status - Alert. General Appearance - Cooperative and Well groomed. Not in acute distress. Orientation - Oriented to time, Oriented to place and Oriented to person. Build & Nutrition - Well developed. HEENT Head - Normal. 
Eye - Normal. 
 
 
Neck  
Carotid Arteries - normal upstroke. No Bruits. Chest and Lung Exam  Inspection:  
Chest Wall: - Normal. Accessory muscles - No use of accessory muscles in breathing.  
Auscultation:  
Breath sounds: - Normal. 
 
 
Cardiovascular  
 Inspection: Jugular vein - Bilateral - Inspection Normal. 
Palpation/Percussion:  
Apical Impulse: - Normal. 
Auscultation: Rhythm - Regular. Heart Sounds - S1 WNL and S2 WNL. No S3 or S4. Murmurs & Other Heart Sounds: Auscultation of the heart reveals - No Murmurs. Abdomen  
Palpation/Percussion: Palpation and Percussion of the abdomen reveal - No Palpable abdominal masses. Auscultation: Auscultation of the abdomen reveals - Bowel sounds normal. 
 
 
Peripheral Vascular  
Upper Extremity: Inspection - Bilateral - No Cyanotic nailbeds or Digital clubbing. Palpation: Radial pulse - Bilateral - Normal. 
Lower Extremity:  
Palpation: Dorsalis pedis pulse - Bilateral - Normal. Posterior tibia pulse - Bilateral - Normal. Edema - Bilateral - No edema. Neurologic  
Mental Status: Affect - normal. 
Motor: - Normal. Gait - Normal. 
 
 
 
Assessment: ICD-10-CM ICD-9-CM 1. Essential hypertension I10 401.9 AMB POC EKG ROUTINE W/ 12 LEADS, INTER & REP  
   2D ECHO COMPLETE ADULT (TTE) W OR WO CONTR 2. Dyslipidemia E78.5 272.4 2D ECHO COMPLETE ADULT (TTE) W OR WO CONTR 3. Palpitations R00.2 785. 1 2D ECHO COMPLETE ADULT (TTE) W OR WO CONTR 4. Lightheadedness R42 780.4 2D ECHO COMPLETE ADULT (TTE) W OR WO CONTR  
5. Orthostatic hypotension I95.1 458.0 2D ECHO COMPLETE ADULT (TTE) W OR WO CONTR 6. PAC (premature atrial contraction) I49.1 427.61 2D ECHO COMPLETE ADULT (TTE) W OR WO CONTR 7. SVT (supraventricular tachycardia) (HCC) I47.1 427.89 2D ECHO COMPLETE ADULT (TTE) W OR WO CONTR Plan: 1. Lightheaded, palpitation: on Holter very brief 5-6 beat SVT noted. Does not appear to be very significant. Also very mild orthostasis in office. Recent TSH noted. Probably little bit of each is contributing to her symptoms. Rakesh norvasc. Add low dose cardizem. Check Echo. She has appt with PCP tomorrow to adjust synthroid dose. Postural changes, hydration and compression stockings d/w pt. F/u in 6 wks. 2. HTN: BP controlled. As above. 3. Recent labs noted.
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
## 2018-08-28 NOTE — MR AVS SNAPSHOT
[facility] [address] [facility] 
[phone] Patient: Ferdinand Davis MRN: [id number] Visit Information Date & Time Provider Department Dept. Phone Encounter #  
 2018 11:00 AM Jaya Sim, [facility] [phone] [id number] Your Appointments 2018  9:00 AM  
Complete Physical with MD [address] [facility] Appt Note: cpe review [address] [id number] [address] [address]  
  
    
 10/16/2018  9:15 AM  
ESTABLISHED PATIENT with Jaya Sim MD  
[facility]) Appt Note: Dr POLO, 6 week f/u,jar  
 [address] [facility]  
[phone] [id number] [facility] Upcoming Health Maintenance Date Due  
 GLAUCOMA SCREENING Q2Y 1994 MEDICARE YEARLY EXAM 3/14/2018 Influenza Age 5 to Adult 2018 DTaP/Tdap/Td series (2 - Td) 2021 Allergies as of 2018  Review Complete On: 2018 By: Jaya Sim MD  
  
 Severity Noted Reaction Type Reactions Statins-hmg-coa Reductase Inhibitors  08/15/2017    Unknown (comments) Current Immunizations  Never Reviewed Name Date Influenza High Dose Vaccine PF 10/25/2017 Influenza Vaccine 10/21/2016, 10/1/2016, 10/12/2015, 10/9/2014, 10/9/2014, 2013, 10/16/2012, 10/4/2011 Pneumococcal Conjugate (PCV-13) 2015 Pneumococcal Polysaccharide (PPSV-23) 3/1/2011 Pneumococcal Vaccine (Unspecified Type) 3/1/2001 Tdap 2011 Zoster Vaccine, Live 2008 Not reviewed this visit You Were Diagnosed With   
  
 Codes Comments Essential hypertension    -  Primary ICD-10-CM: I10 
ICD-9-CM: 401.9 Dyslipidemia     ICD-10-CM: E78.5 ICD-9-CM: 272.4 Palpitations     ICD-10-CM: R00.2 ICD-9-CM: 785.1 Lightheadedness     ICD-10-CM: T01 ICD-9-CM: 780.4 Orthostatic hypotension     ICD-10-CM: I95.1 ICD-9-CM: 458.0 PAC (premature atrial contraction)     ICD-10-CM: I49.1 ICD-9-CM: 427.61   
 SVT (supraventricular tachycardia) (HCC)     ICD-10-CM: I47.1 ICD-9-CM: 427.89 Vitals BP Pulse Height(growth percentile) Weight(growth percentile) LMP SpO2  
 126/68 (BP 1 Location: Left arm, BP Patient Position: Standing) 84 5' 5\" (1.651 m) 125 lb 14.4 oz (57.1 kg) (LMP Unknown) 98% BMI OB Status Smoking Status 20.95 kg/m2 Postmenopausal Never Smoker Vitals History BMI and BSA Data Body Mass Index Body Surface Area  
 20.95 kg/m 2 1.62 m 2 Preferred Pharmacy Pharmacy Name Phone [facility] [place] [facility] [address], [address] [place] [address] Avenue [phone] Your Updated Medication List  
  
   
This list is accurate as of 8/28/18 11:30 AM.  Always use your most recent med list.  
  
  
  
  
 aspirin delayed-release 81 mg tablet Take 81 mg by mouth daily. dilTIAZem  mg ER capsule Commonly known as:  CARDIZEM CD Take 1 Cap by mouth daily. levocetirizine 5 mg tablet Commonly known as:  Graylon Leo Take 1 Tab by mouth nightly. PRESERVISION AREDS 2 PO Take 1 Tab by mouth two (2) times a day. * synthroid 50 mcg tablet Generic drug:  levothyroxine Take 50 mcg by mouth two (2) times a week. Sat/Sun  
  
 * levothyroxine 75 mcg tablet Commonly known as:  SYNTHROID Take  by mouth. 1 tab M->F  
  
 THERATEARS 0.25 % Drop ophthalmic solution Generic drug:  carboxymethylcellulose sodium Apply 1 Drop to eye two (2) times a day. VITAMIN B-12 500 mcg tablet Generic drug:  cyanocobalamin Take 1,000 mcg by mouth daily. VITAMIN D3 PO Take 1,000 Units by mouth daily. * Notice: This list has 2 medication(s) that are the same as other medications prescribed for you. Read the directions carefully, and ask your doctor or other care provider to review them with you. Prescriptions Sent to Pharmacy Refills  
 dilTIAZem CD (CARDIZEM CD) 120 mg ER capsule 3 Sig: Take 1 Cap by mouth daily. Class: Normal  
 Pharmacy: [facility] [address], [address] [facility] #: [phone] Route: Oral  
  
We Performed the Following AMB POC EKG ROUTINE W/ 12 LEADS, INTER & REP [73985 CPT(R)] To-Do List   
 08/28/2018 ECHO:  2D ECHO COMPLETE ADULT (TTE) W OR WO CONTR   
  
 09/04/2018 8:40 AM  
  Appointment with [facility] [address] at [address] [place] [address] ([phone]) Shower or bathe using soap and water. Do not use deodorant, powder, perfumes, or lotion the day of your exam.  If your prior mammograms were not performed at [facility] 6 please bring films with you or forward prior images 2 days before your procedure. Check in at registration 15min before your appointment time unless you were instructed to do otherwise. A script is not necessary, but if you have one, please bring it on the day of the mammogram or have it faxed to the department. You are responsible for finding a method of transportation to your appointment. If you don't have transportation, please reschedule your appointment at least 24 hours in advance. [facility] [phone] [facility] [place]  [phone] [address]  [phone] [facility] [phone] [place] [id number] Delwin Opitz [phone] Introducing [facility]! [employer] introduces [employer] patient portal. Now you can access parts of your medical record, email your doctor's office, and request medication refills online. 1. In your internet browser, go to [email or web address] 2. Click on the First Time User? Click Here link in the Sign In box.  You will see the New Member Sign Up page. 3. Enter your [employer] Access Code exactly as it appears below. You will not need to use this code after youve completed the sign-up process. If you do not sign up before the expiration date, you must request a new code. · [employer] Access Code: [email or web address] Expires: 10/16/2018  4:58 PM 
 
4. Enter the last four digits of your Social Security Number (xxxx) and Date of Birth (mm/dd/yyyy) as indicated and click Submit. You will be taken to the next sign-up page. 5. Create a [employer] ID. This will be your [employer] login ID and cannot be changed, so think of one that is secure and easy to remember. 6. Create a [employer] password. You can change your password at any time. 7. Enter your Password Reset Question and Answer. This can be used at a later time if you forget your password. 8. Enter your e-mail address. You will receive e-mail notification when new information is available in [address] 9. Click Sign Up. You can now view and download portions of your medical record. 10. Click the Download Summary menu link to download a portable copy of your medical information. If you have questions, please visit the Frequently Asked Questions section of the [employer] website. Remember, [employer] is NOT to be used for urgent needs. For medical emergencies, dial 911. Now available from your iPhone and Android! Please provide this summary of care documentation to your next provider. Your primary care clinician is listed as Orville Curry. If you have any questions after today's visit, please call [phone].

## 2018-08-29 ENCOUNTER — OFFICE VISIT (OUTPATIENT)
Dept: INTERNAL MEDICINE CLINIC | Age: 83
End: 2018-08-29

## 2018-08-29 VITALS
RESPIRATION RATE: 16 BRPM | OXYGEN SATURATION: 97 % | SYSTOLIC BLOOD PRESSURE: 135 MMHG | WEIGHT: 127 LBS | BODY MASS INDEX: 21.16 KG/M2 | HEART RATE: 79 BPM | HEIGHT: 65 IN | DIASTOLIC BLOOD PRESSURE: 70 MMHG | TEMPERATURE: 98.5 F

## 2018-08-29 DIAGNOSIS — E78.5 DYSLIPIDEMIA: ICD-10-CM

## 2018-08-29 DIAGNOSIS — Z23 IMMUNIZATION DUE: ICD-10-CM

## 2018-08-29 DIAGNOSIS — R55 NEAR SYNCOPE: ICD-10-CM

## 2018-08-29 DIAGNOSIS — Z00.00 PE (PHYSICAL EXAM), ANNUAL: Primary | ICD-10-CM

## 2018-08-29 DIAGNOSIS — H91.93 BILATERAL HEARING LOSS, UNSPECIFIED HEARING LOSS TYPE: ICD-10-CM

## 2018-08-29 DIAGNOSIS — S09.90XD TRAUMATIC INJURY OF HEAD, SUBSEQUENT ENCOUNTER: ICD-10-CM

## 2018-08-29 DIAGNOSIS — H35.30 ARMD (AGE RELATED MACULAR DEGENERATION): ICD-10-CM

## 2018-08-29 DIAGNOSIS — E03.8 ADULT ONSET HYPOTHYROIDISM: ICD-10-CM

## 2018-08-29 DIAGNOSIS — I25.10 ASHD (ARTERIOSCLEROTIC HEART DISEASE): ICD-10-CM

## 2018-08-29 DIAGNOSIS — I10 ESSENTIAL HYPERTENSION: ICD-10-CM

## 2018-08-29 DIAGNOSIS — M19.91 PRIMARY OSTEOARTHRITIS, UNSPECIFIED SITE: ICD-10-CM

## 2018-08-29 DIAGNOSIS — R42 DIZZINESS: ICD-10-CM

## 2018-08-29 DIAGNOSIS — Z80.0 FAMILY HISTORY OF COLON CANCER: ICD-10-CM

## 2018-08-29 DIAGNOSIS — M81.0 OSTEOPOROSIS, UNSPECIFIED OSTEOPOROSIS TYPE, UNSPECIFIED PATHOLOGICAL FRACTURE PRESENCE: ICD-10-CM

## 2018-08-29 DIAGNOSIS — F41.9 ANXIETY: ICD-10-CM

## 2018-08-29 DIAGNOSIS — G31.84 MILD COGNITIVE IMPAIRMENT WITH MEMORY LOSS: ICD-10-CM

## 2018-08-29 DIAGNOSIS — Z86.73 HISTORY OF TIA (TRANSIENT ISCHEMIC ATTACK): ICD-10-CM

## 2018-08-29 DIAGNOSIS — E55.9 VITAMIN D DEFICIENCY: ICD-10-CM

## 2018-08-29 LAB — SPIROMETRY INTERPRETATION, SPITPOCT: NORMAL

## 2018-08-29 RX ORDER — LEVOTHYROXINE SODIUM 50 UG/1
50 TABLET ORAL 2 TIMES WEEKLY
Qty: 13 TAB | Refills: 3 | Status: SHIPPED | OUTPATIENT
Start: 2018-08-31 | End: 2018-08-29

## 2018-08-29 RX ORDER — LEVOTHYROXINE SODIUM 50 UG/1
75 TABLET ORAL
COMMUNITY
End: 2019-01-03

## 2018-08-29 RX ORDER — LEVOTHYROXINE SODIUM 75 UG/1
75 TABLET ORAL
Qty: 78 TAB | Refills: 3 | Status: SHIPPED | OUTPATIENT
Start: 2018-08-29 | End: 2019-01-03

## 2018-08-29 NOTE — PROGRESS NOTES
Chief Complaint   Patient presents with    Complete Physical       Depression Risk Factor Screening:     PHQ over the last two weeks 8/28/2018   Little interest or pleasure in doing things Not at all   Feeling down, depressed, irritable, or hopeless Not at all   Total Score PHQ 2 0       Functional Ability and Level of Safety:     Activities of Daily Living  ADL Assessment 8/29/2018   Feeding yourself No Help Needed   Getting from bed to chair No Help Needed   Getting dressed No Help Needed   Bathing or showering No Help Needed   Walk across the room (includes cane/walker) No Help Needed   Using the telphone No Help Needed   Taking your medications No Help Needed   Preparing meals No Help Needed   Managing money (expenses/bills) No Help Needed   Moderately strenuous housework (laundry) No Help Needed   Shopping for personal items (toiletries/medicines) No Help Needed   Shopping for groceries No Help Needed   Driving No Help Needed   Climbing a flight of stairs No Help Needed   Getting to places beyond walking distances No Help Needed       Fall Risk  Fall Risk Assessment, last 12 mths 8/28/2018   Able to walk? Yes   Fall in past 12 months? No   Fall with injury? -   Number of falls in past 12 months -   Fall Risk Score -       Abuse Screen  Abuse Screening Questionnaire 8/29/2018   Do you ever feel afraid of your partner? N   Are you in a relationship with someone who physically or mentally threatens you? N   Is it safe for you to go home?  Y         Patient Care Team   Patient Care Team:  Corina Kwon MD as PCP - General (Internal Medicine)  Alyson Bernard MD as Surgeon (General Surgery)

## 2018-09-05 ENCOUNTER — CLINICAL SUPPORT (OUTPATIENT)
Dept: CARDIOLOGY CLINIC | Age: 83
End: 2018-09-05

## 2018-09-05 DIAGNOSIS — I49.1 PAC (PREMATURE ATRIAL CONTRACTION): ICD-10-CM

## 2018-09-05 DIAGNOSIS — I10 ESSENTIAL HYPERTENSION: ICD-10-CM

## 2018-09-05 DIAGNOSIS — I47.1 SVT (SUPRAVENTRICULAR TACHYCARDIA) (HCC): ICD-10-CM

## 2018-09-05 DIAGNOSIS — E78.5 DYSLIPIDEMIA: ICD-10-CM

## 2018-09-05 DIAGNOSIS — R00.2 PALPITATIONS: ICD-10-CM

## 2018-09-05 DIAGNOSIS — R42 LIGHTHEADEDNESS: ICD-10-CM

## 2018-09-05 DIAGNOSIS — I95.1 ORTHOSTATIC HYPOTENSION: ICD-10-CM

## 2018-09-06 NOTE — PROGRESS NOTES
Call daughter. ECHO essentially normal except mitral valve somewhat leaky. This should not be cause of her symptoms. Probably age related only.

## 2018-09-12 ENCOUNTER — TELEPHONE (OUTPATIENT)
Dept: CARDIOLOGY CLINIC | Age: 83
End: 2018-09-12

## 2018-09-12 NOTE — TELEPHONE ENCOUNTER
Patient's daughter called and said that patient states that she was suppose to wear compression stockings. If so, which kind? Light, Medium, or Heavy? And for how long? Reed Mohr would, also, like to know the results of recent testing  Of the Echo.

## 2018-09-14 NOTE — TELEPHONE ENCOUNTER
Returned daughter Nazario Shields call, on HIPPA form, advised her that pt was dizzy with orthostatic changes in her BP in office and that is why they prescribed the compression stockings. Advised to wear during the day and take off at night before she goes to bed. Advised for pt to try on and see what compression stockings fit her the best. Advised her I did not have the results of the echo but I would send to  for the results and call her on Monday. Daughter verbalized understanding. Message  Received: Today       MD Elena Alfaro LPN       Caller: Unspecified (2 days ago, 12:01 PM)                     Echo looks k       Called daughter Pinky Marie, on HIPPA form, advised her that pt's echo looks good, okay. She advised she would tell her mother(she is there with her now). She verbalized understanding.

## 2018-09-21 ENCOUNTER — HOSPITAL ENCOUNTER (OUTPATIENT)
Dept: MAMMOGRAPHY | Age: 83
Discharge: HOME | End: 2018-09-21
Attending: INTERNAL MEDICINE
Payer: MEDICARE

## 2018-09-21 DIAGNOSIS — Z12.39 SCREENING BREAST EXAMINATION: ICD-10-CM

## 2018-09-21 PROCEDURE — 77067 SCR MAMMO BI INCL CAD: CPT

## 2018-10-10 ENCOUNTER — OFFICE VISIT (OUTPATIENT)
Dept: INTERNAL MEDICINE CLINIC | Age: 83
End: 2018-10-10

## 2018-10-10 VITALS
TEMPERATURE: 98.4 F | HEIGHT: 65 IN | SYSTOLIC BLOOD PRESSURE: 139 MMHG | HEART RATE: 74 BPM | BODY MASS INDEX: 21.36 KG/M2 | OXYGEN SATURATION: 93 % | DIASTOLIC BLOOD PRESSURE: 72 MMHG | WEIGHT: 128.2 LBS

## 2018-10-10 DIAGNOSIS — R42 DIZZINESS: ICD-10-CM

## 2018-10-10 DIAGNOSIS — E03.8 ADULT ONSET HYPOTHYROIDISM: ICD-10-CM

## 2018-10-10 DIAGNOSIS — Z23 ENCOUNTER FOR IMMUNIZATION: Primary | ICD-10-CM

## 2018-10-10 DIAGNOSIS — I95.1 ORTHOSTATIC HYPOTENSION: ICD-10-CM

## 2018-10-10 DIAGNOSIS — I10 ESSENTIAL HYPERTENSION: ICD-10-CM

## 2018-10-10 LAB
T4 FREE SERPL-MCNC: 1.12 NG/DL (ref 0.71–1.85)
TSH BLD-ACNC: 1.62 UIU/ML (ref 0.4–4.2)

## 2018-10-10 RX ORDER — LEVOCETIRIZINE DIHYDROCHLORIDE 5 MG/1
TABLET, FILM COATED ORAL
COMMUNITY
Start: 2018-07-25 | End: 2019-01-03

## 2018-10-10 NOTE — PATIENT INSTRUCTIONS
Vaccine Information Statement    Influenza (Flu) Vaccine (Inactivated or Recombinant): What you need to know    Many Vaccine Information Statements are available in [place] and other languages. See www.immunize.org/vis  Hojas de Información Sobre Vacunas están disponibles en Español y en muchos otros idiomas. Visite www.immunize.org/vis    1. Why get vaccinated? Influenza (flu) is a contagious disease that spreads around the [place] every year, usually between October and May. Flu is caused by influenza viruses, and is spread mainly by coughing, sneezing, and close contact. Anyone can get flu. Flu strikes suddenly and can last several days. Symptoms vary by age, but can include:   fever/chills   sore throat   muscle aches   fatigue   cough   headache    runny or stuffy nose    Flu can also lead to pneumonia and blood infections, and cause diarrhea and seizures in children. If you have a medical condition, such as heart or lung disease, flu can make it worse. Flu is more dangerous for some people. Infants and young children, people 72years of age and older, pregnant women, and people with certain health conditions or a weakened immune system are at greatest risk. Each year thousands of people in the [facility] die from flu, and many more are hospitalized. Flu vaccine can:   keep you from getting flu,   make flu less severe if you do get it, and   keep you from spreading flu to your family and other people. 2. Inactivated and recombinant flu vaccines    A dose of flu vaccine is recommended every flu season. Children 6 months through 6years of age may need two doses during the same flu season. Everyone else needs only one dose each flu season.        Some inactivated flu vaccines contain a very small amount of a mercury-based preservative called thimerosal. Studies have not shown thimerosal in vaccines to be harmful, but flu vaccines that do not contain thimerosal are available. There is no live flu virus in flu shots. They cannot cause the flu. There are many flu viruses, and they are always changing. Each year a new flu vaccine is made to protect against three or four viruses that are likely to cause disease in the upcoming flu season. But even when the vaccine doesnt exactly match these viruses, it may still provide some protection    Flu vaccine cannot prevent:   flu that is caused by a virus not covered by the vaccine, or   illnesses that look like flu but are not. It takes about 2 weeks for protection to develop after vaccination, and protection lasts through the flu season. 3. Some people should not get this vaccine    Tell the person who is giving you the vaccine:     If you have any severe, life-threatening allergies. If you ever had a life-threatening allergic reaction after a dose of flu vaccine, or have a severe allergy to any part of this vaccine, you may be advised not to get vaccinated. Most, but not all, types of flu vaccine contain a small amount of egg protein.  If you ever had Guillain-Barré Syndrome (also called GBS). Some people with a history of GBS should not get this vaccine. This should be discussed with your doctor.  If you are not feeling well. It is usually okay to get flu vaccine when you have a mild illness, but you might be asked to come back when you feel better. 4. Risks of a vaccine reaction    With any medicine, including vaccines, there is a chance of reactions. These are usually mild and go away on their own, but serious reactions are also possible. Most people who get a flu shot do not have any problems with it.      Minor problems following a flu shot include:    soreness, redness, or swelling where the shot was given     hoarseness   sore, red or itchy eyes   cough   fever   aches   headache   itching   fatigue  If these problems occur, they usually begin soon after the shot and last 1 or 2 days. More serious problems following a flu shot can include the following:     There may be a small increased risk of Guillain-Barré Syndrome (GBS) after inactivated flu vaccine. This risk has been estimated at 1 or 2 additional cases per million people vaccinated. This is much lower than the risk of severe complications from flu, which can be prevented by flu vaccine.  Young children who get the flu shot along with pneumococcal vaccine (PCV13) and/or DTaP vaccine at the same time might be slightly more likely to have a seizure caused by fever. Ask your doctor for more information. Tell your doctor if a child who is getting flu vaccine has ever had a seizure. Problems that could happen after any injected vaccine:      People sometimes faint after a medical procedure, including vaccination. Sitting or lying down for about 15 minutes can help prevent fainting, and injuries caused by a fall. Tell your doctor if you feel dizzy, or have vision changes or ringing in the ears.  Some people get severe pain in the shoulder and have difficulty moving the arm where a shot was given. This happens very rarely.  Any medication can cause a severe allergic reaction. Such reactions from a vaccine are very rare, estimated at about 1 in a million doses, and would happen within a few minutes to a few hours after the vaccination. As with any medicine, there is a very remote chance of a vaccine causing a serious injury or death. The safety of vaccines is always being monitored. For more information, visit: www.cdc.gov/vaccinesafety/    5. What if there is a serious reaction? What should I look for?  Look for anything that concerns you, such as signs of a severe allergic reaction, very high fever, or unusual behavior.     Signs of a severe allergic reaction can include hives, swelling of the face and throat, difficulty breathing, a fast heartbeat, dizziness, and weakness - usually within a few minutes to a few hours after the vaccination. What should I do?  If you think it is a severe allergic reaction or other emergency that cant wait, call 9-1-1 and get the person to the nearest hospital. Otherwise, call your doctor.  Reactions should be reported to the Vaccine Adverse Event Reporting System (VAERS). Your doctor should file this report, or you can do it yourself through  the VAERS web site at www.vaers. [facility].gov, or by calling 6-789.473.7090. VAERS does not give medical advice. 6. The National Vaccine Injury Compensation Program    The [facility] Vaccine Injury Compensation Program (VICP) is a federal program that was created to compensate people who may have been injured by certain vaccines. Persons who believe they may have been injured by a vaccine can learn about the program and about filing a claim by calling 1-552.820.2254 or visiting the [employer] website at www.[facility].gov/vaccinecompensation. There is a time limit to file a claim for compensation. 7. How can I learn more?  Ask your healthcare provider. He or she can give you the vaccine package insert or suggest other sources of information.  Call your local or state health department.  Contact the Centers for Disease Control and Prevention (CDC):  - Call 0-194.720.9110 (1-800-CDC-INFO) or  - Visit CDCs website at www.cdc.gov/flu    Vaccine Information Statement   Inactivated Influenza Vaccine   8/7/2015  42 BREANNA Luis Alfredo Sandra [id number]    Department of Health and Human Services  Centers for Disease Control and Prevention    Office Use Only

## 2018-10-10 NOTE — PROGRESS NOTES
Present for routine immunizations. patient denies any symptoms , reactions or allergies that would exclude them from being immunized today. Risks and adverse reactions were discussed and the VIS was given to them. All questions were addressed. patient was observed for 10 min post injection. There were no reactions observed.     Adiel Freitas LPN

## 2018-10-10 NOTE — PROGRESS NOTES
Subjective:   Clinton Sebastian is a 80 y.o. female      Chief Complaint   Patient presents with    Follow-up     6 weeks        History of present illness:  Ms. Kori Webb returns in follow up. Overall she's doing better. She has more good days than bad days. She's still having some issues with dizziness, particularly first thing in the morning when she gets up out of bed. This is very positional.  It tends to wear off as the day goes on, but she still relates that her head just feels funny at times. She's seen the neurologist, cardiologist, etc.  There are  few other people to see. She does see Dr. Polo Viveros on a regular basis to check her ears and clean cerumen. I think it would be worthwhile him seeing her regarding the dizziness to see if there is any inner ear consideration to her current symptoms. Overall she feels better. We're still trying to make it so that she has more good days than bad days, which she is now achieving. Hopefully this will improve further as we go on. Her blood pressure is good today. She denies any chest pain, shortness of breath, palpitations. She's noted no new GI or  symptoms. We will recheck her thyroid today. We'll have her see Dr. Polo Viveros and then follow up in three months' time unless further problems develop in the meantime.         Patient Active Problem List    Diagnosis Date Noted    Adult onset hypothyroidism 08/15/2017     Priority: 1 - One    Dyslipidemia 08/15/2017     Priority: 1 - One    Hypertension 08/15/2017     Priority: 1 - One    Anxiety 08/15/2017     Priority: 2 - Two    Mild cognitive impairment with memory loss 08/15/2017     Priority: 2 - Two    DJD (degenerative joint disease) 08/29/2017     Priority: 3 - Three    Abdominal bloating 08/15/2017     Priority: 3 - Three    ASHD (arteriosclerotic heart disease) 08/15/2017     Priority: 3 - Three    History of TIA (transient ischemic attack) 08/15/2017     Priority: 3 - Three    Near syncope 08/15/2017     Priority: 3 - Three    ARMD (age related macular degeneration) 08/15/2017     Priority: 4 - Four    Frequent UTI 08/15/2017     Priority: 4 - Four    Loss of hair 08/15/2017     Priority: 4 - Four    Urinary incontinence 08/15/2017     Priority: 4 - Four    Villous adenoma 08/15/2017     Priority: 4 - Four    Family history of colon cancer 08/15/2017     Priority: 5 - Five    H/O basal cell carcinoma excision 08/15/2017     Priority: 5 - Five    Herpes zoster without complication 48/25/2225     Priority: 5 - Five    [place] (hard of hearing) 08/15/2017     Priority: 5 - Five    Leg cramps 08/15/2017     Priority: 5 - Five    Medial meniscus tear 08/15/2017     Priority: 5 - Five    Osteoporosis 08/15/2017     Priority: 5 - Five    Vitamin D deficiency 08/15/2017     Priority: 5 - Five    Orthostatic hypotension 08/28/2018    PE (physical exam), annual 11/29/2017      Past Surgical History:   Procedure Laterality Date    ABDOMEN SURGERY PROC UNLISTED      colon resection    HX CATARACT REMOVAL Bilateral     HX HEENT      partial thyroidectomy    HX HEENT  02/02/2017    redo neck exploration w/completion thyroidectomy    HX OTHER SURGICAL      skin cancer removed    HX TONSILLECTOMY      HX UROLOGICAL      Contigen injections    NE COLONOSCOPY FLX DX W/COLLJ SPEC WHEN PFRMD  3/13/2013           Allergies   Allergen Reactions    Statins-Hmg-Coa Reductase Inhibitors Unknown (comments)      Family History   Problem Relation Age of Onset    Cancer Mother      stomach    Dementia Mother     Hypertension Mother     Hypertension Father     Cancer Sister      Colon Cancer    Heart Disease Brother     Diabetes Brother     Cancer Brother      Colon Cancer    Heart Disease Son       Social History     Social History    Marital status:      Spouse name: N/A    Number of children: N/A    Years of education: N/A     Occupational History    Not on file.      Social History Main Topics    Smoking status: Never Smoker    Smokeless tobacco: Never Used    Alcohol use No    Drug use: No    Sexual activity: Not on file     Other Topics Concern    Not on file     Social History Narrative     Outpatient Prescriptions Marked as Taking for the 10/10/18 encounter (Office Visit) with Kenneth Melgar MD   Medication Sig Dispense Refill    levothyroxine (SYNTHROID) 75 mcg tablet Take 1 Tab by mouth Daily (before breakfast). 1 tab M->Sat 78 Tab 3    levothyroxine (SYNTHROID) 50 mcg tablet Take  by mouth every seven (7) days. Sunday      dilTIAZem CD (CARDIZEM CD) 120 mg ER capsule Take 1 Cap by mouth daily. 30 Cap 3    VIT C/E/ZN/COPPR/LUTEIN/ZEAXAN (PRESERVISION AREDS 2 PO) Take 1 Tab by mouth two (2) times a day.  carboxymethylcellulose sodium (THERATEARS) 0.25 % drop Apply 1 Drop to eye two (2) times a day.  CHOLECALCIFEROL, VITAMIN D3, (VITAMIN D3 PO) Take 1,000 Units by mouth daily.  aspirin delayed-release 81 mg tablet Take 81 mg by mouth daily.  cyanocobalamin (VITAMIN B-12) 500 mcg tablet Take 1,000 mcg by mouth daily. Review of Systems              Constitutional:  She denies fever, weight loss, sweats or fatigue. HEENT:  No blurred or double vision, headache or dizziness. No difficulty with swallowing, taste, speech or smell. Respiratory:  No cough, wheezing or shortness of breath. No sputum production. Cardiac:  Denies chest pain, palpitations, unexplained indigestion, syncope, edema, PND or orthopnea. GI:  No changes in bowel movements, no abdominal pain, no bloating, anorexia, nausea, vomiting or heartburn. :  No frequency or dysuria. Denies incontinence. Extremities:  No joint pain, stiffness or swelling. Skin:  No recent rashes or mole changes. Neurological:  No numbness, tingling, burning paresthesias or loss of motor strength. No syncope, frequent headaches or memory loss.   Has dizziness as noted    Objective:     Vitals: 10/10/18 0859   BP: 139/72   Pulse: 74   Temp: 98.4 °F (36.9 °C)   TempSrc: Oral   SpO2: 93%   Weight: 128 lb 3.2 oz (58.2 kg)   Height: 5' 5\" (1.651 m)   PainSc:   0 - No pain       Body mass index is 21.33 kg/(m^2). Physical Examination:              General Appearance:  Well-developed, well-nourished, no acute  distress. HEENT:     Ears:  The TMs and ear canals were clear. Eyes:  The pupillary responses were normal.  Extraocular muscle function intact. Lids and conjunctiva not injected. Neck:  Supple without thyromegaly or adenopathy. No JVD noted. Lungs:  Clear to auscultation and percussion. Cardiac:  Regular rate and rhythm without murmur. GI: nontender w/o mass. Normal BS's. Extremities:  No clubbing, cyanosis or edema. Skin:  No rash or unusual mole changes noted. Neurological:  Grossly normal.            Assessment/Plan:   Impressions:      ICD-10-CM ICD-9-CM    1. Encounter for immunization Z23 V03.89 INFLUENZA VACCINE INACTIVATED (IIV), SUBUNIT, ADJUVANTED, IM   2. Adult onset hypothyroidism E03.8 244.8 AMB POC T4, FREE      AMB POC TSH   3. Essential hypertension I10 401.9 AMB POC TSH   4. Orthostatic hypotension I95.1 458.0 AMB POC TSH   5. Dizziness R42 780.4 REFERRAL TO ENT-OTOLARYNGOLOGY        Plan:  1. Continue present meds  2. Discussed lifestyle modifications including Na restriction and exercise      Follow-up Disposition:  Return in about 3 months (around 1/10/2019).       Orders Placed This Encounter    Influenza Vaccine Inactivated (IIV)(FLUAD), Subunit, Adjuvanted, IM, (09032)    REFERRAL TO ENT-OTOLARYNGOLOGY     Referral Priority:   Routine     Referral Type:   Consultation     Referral Reason:   Specialty Services Required     Referral Location:   [facility]     Referred to Provider:   Conchis Holder MD     Requested Specialty:   Otolaryngology    AMB POC T4, FREE    AMB POC TSH       Mauricio Chen MD

## 2018-10-10 NOTE — PROGRESS NOTES
Reviewed record in preparation for visit and have obtained necessary documentation. Identified pt with two pt identifiers(name and ). Chief Complaint   Patient presents with    Follow-up     6 weeks        Coordination of Care Questionnaire:  :     1) Have you been to an emergency room, urgent care clinic since your last visit? No     Hospitalized since your last visit? No               2) Have you seen or consulted any other health care providers outside of [address] [place] [address] since your last visit?  No

## 2018-10-16 ENCOUNTER — OFFICE VISIT (OUTPATIENT)
Dept: CARDIOLOGY CLINIC | Age: 83
End: 2018-10-16

## 2018-10-16 VITALS
OXYGEN SATURATION: 97 % | WEIGHT: 130.4 LBS | BODY MASS INDEX: 21.73 KG/M2 | HEIGHT: 65 IN | HEART RATE: 77 BPM | SYSTOLIC BLOOD PRESSURE: 124 MMHG | DIASTOLIC BLOOD PRESSURE: 62 MMHG

## 2018-10-16 DIAGNOSIS — I10 ESSENTIAL HYPERTENSION: ICD-10-CM

## 2018-10-16 DIAGNOSIS — I47.1 PSVT (PAROXYSMAL SUPRAVENTRICULAR TACHYCARDIA) (HCC): Primary | ICD-10-CM

## 2018-10-16 DIAGNOSIS — E78.5 DYSLIPIDEMIA: ICD-10-CM

## 2018-10-16 RX ORDER — DILTIAZEM HYDROCHLORIDE 120 MG/1
120 CAPSULE, COATED, EXTENDED RELEASE ORAL DAILY
Qty: 90 CAP | Refills: 3 | Status: SHIPPED | OUTPATIENT
Start: 2018-10-16 | End: 2019-03-11

## 2018-10-16 NOTE — PROGRESS NOTES
Donita Noriega DNP, ANP-BC Subjective/HPI:  
 
Samantha Morris is a 80 y.o. female is here for follow-up visit recent diagnosis of PSVT on Holter monitor, echocardiogram structurally normal heart. Has dizziness upon wakening in the morning otherwise no other positional changes will trigger dizziness. Not orthostatic today. She is pending ENT consult Friday. PCP Provider Nishi Bourne MD 
Past Medical History:  
Diagnosis Date  Abdominal bloating 8/15/2017  Adult onset hypothyroidism 8/15/2017  Anxiety 8/15/2017  ARMD (age related macular degeneration) 8/15/2017  
 ASHD (arteriosclerotic heart disease) 8/15/2017  Cancer ([address] Colon and Skin  Cervical strain 8/15/2017  Dyslipidemia 8/15/2017  Dysphagia 8/15/2017  Family history of colon cancer 8/15/2017  Frequent UTI 8/15/2017  H/O basal cell carcinoma excision 8/15/2017  Herpes zoster without complication 2/33/5353  History of TIA (transient ischemic attack) 8/15/2017  
 [place] (hard of hearing) 8/15/2017  Hypertension  Leg cramps 8/15/2017  Loss of hair 8/15/2017  Medial meniscus tear 8/15/2017  Mild cognitive impairment with memory loss 8/15/2017  Near syncope 8/15/2017  Osteoarthritis of both knees 8/15/2017  Osteoporosis 8/15/2017  Other ill-defined conditions(799.89)   
 hypercholesterolemia  Thyroid disease  Urinary incontinence 8/15/2017  Villous adenoma 8/15/2017  Vitamin D deficiency 8/15/2017 Past Surgical History:  
Procedure Laterality Date  ABDOMEN SURGERY PROC UNLISTED    
 colon resection  HX CATARACT REMOVAL Bilateral   
 HX HEENT    
 partial thyroidectomy  HX HEENT  02/02/2017  
 redo neck exploration w/completion thyroidectomy  HX OTHER SURGICAL    
 skin cancer removed  HX TONSILLECTOMY  HX UROLOGICAL Contigen injections  CT COLONOSCOPY FLX DX W/COLLJ SPEC WHEN PFRMD  3/13/2013 Allergies Allergen Reactions  Statins-Hmg-Coa Reductase Inhibitors Unknown (comments) Family History Problem Relation Age of Onset  Cancer Mother   
  stomach  Dementia Mother  Hypertension Mother  Hypertension Father  Cancer Sister Colon Cancer  Heart Disease Brother  Diabetes Brother  Cancer Brother Colon Cancer  Heart Disease Son   
  
Current Outpatient Prescriptions Medication Sig  levothyroxine (SYNTHROID) 75 mcg tablet Take 1 Tab by mouth Daily (before breakfast). 1 tab M->Sat (Patient taking differently: Take 75 mcg by mouth Daily (before breakfast). SUNDAY)  levothyroxine (SYNTHROID) 50 mcg tablet Take 75 mcg by mouth every seven (7) days. Sunday  dilTIAZem CD (CARDIZEM CD) 120 mg ER capsule Take 1 Cap by mouth daily.  VIT C/E/ZN/COPPR/LUTEIN/ZEAXAN (PRESERVISION AREDS 2 PO) Take 1 Tab by mouth two (2) times a day.  carboxymethylcellulose sodium (THERATEARS) 0.25 % drop Apply 1 Drop to eye two (2) times a day.  CHOLECALCIFEROL, VITAMIN D3, (VITAMIN D3 PO) Take 1,000 Units by mouth daily.  aspirin delayed-release 81 mg tablet Take 81 mg by mouth daily.  cyanocobalamin (VITAMIN B-12) 500 mcg tablet Take 1,000 mcg by mouth daily.  levocetirizine (XYZAL) 5 mg tablet No current facility-administered medications for this visit. Vitals:  
 10/16/18 3545 10/16/18 0935 10/16/18 7434 BP: 132/68 128/64 124/62 Pulse: 77 SpO2: 97% Weight: 130 lb 6.4 oz (59.1 kg) Height: 5' 5\" (1.651 m) Social History Social History  Marital status:  Spouse name: N/A  
 Number of children: N/A  
 Years of education: N/A Occupational History  Not on file. Social History Main Topics  Smoking status: Never Smoker  Smokeless tobacco: Never Used  Alcohol use No  
 Drug use: No  
 Sexual activity: Not on file Other Topics Concern  Not on file Social History Narrative I have reviewed the nurses notes, vitals, problem list, allergy list, medical history, family, social history and medications. Review of Symptoms: 
 
General: Pt denies excessive weight gain or loss. Pt is able to conduct ADL's HEENT: Denies blurred vision, headaches, epistaxis and difficulty swallowing. Respiratory: Denies shortness of breath, DODGE, wheezing or stridor. Cardiovascular: Denies precordial pain, palpitations, edema or PND Gastrointestinal: Denies poor appetite, indigestion, abdominal pain or blood in stool Musculoskeletal: Denies pain or swelling from muscles or joints Neurologic: Denies tremor, paresthesias, + dizziness Skin: Denies rash, itching or texture change. Physical Exam:   
 
General: Well developed, in no acute distress, cooperative and alert HEENT: No carotid bruits, no JVD, trach is midline. Neck Supple, PEERL, EOM intact. Small bilateral ear canals only partial visualization of tympanic membranes no evidence of infection nor significant fluid accumulation. Heart:  Normal S1/S2 negative S3 or S4. Regular, no murmur, gallop or rub.  
Respiratory: Clear bilaterally x 4, no wheezing or rales Abdomen:   Soft, non-tender, no masses, bowel sounds are active.  
Extremities:  No edema, normal cap refill, no cyanosis, atraumatic. Neuro: A&Ox3, speech clear, gait stable. Skin: Skin color is normal. No rashes or lesions. Non diaphoretic Vascular: 2+ pulses symmetric in all extremities Cardiographics ECG: Sinus rhythm Results for orders placed or performed during the hospital encounter of 05/30/17 EKG, 12 LEAD, INITIAL Result Value Ref Range Ventricular Rate 68 BPM  
 Atrial Rate 68 BPM  
 P-R Interval 180 ms QRS Duration 84 ms Q-T Interval 388 ms QTC Calculation (Bezet) 412 ms Calculated P Axis 57 degrees Calculated R Axis 57 degrees Calculated T Axis 63 degrees Diagnosis Normal sinus rhythm Low voltage QRS 
 When compared with ECG of 26-JAN-2017 08:20, No significant change was found Confirmed by Neftaly Navarrete M.D., Citlaly Archuleta ([id number]) on 5/31/2017 3:42:01 PM 
  
 
 
 
Cardiology Labs: 
No results found for: CHOL, [address], [address], [address], [address], HDL, LDL, LDLC, DLDLP, TGLX, TRIGL, TRIGP, CHHD, CHHDX Lab Results Component Value Date/Time Sodium 136 07/18/2018 04:54 PM  
 Potassium 5.1 07/20/2018 10:50 AM  
 Chloride 97 07/18/2018 04:54 PM  
 CO2 25 07/18/2018 04:54 PM  
 Anion gap 7 05/30/2017 08:51 PM  
 Glucose 123 (H) 07/18/2018 04:54 PM  
 BUN 13 07/18/2018 04:54 PM  
 Creatinine 0.71 07/18/2018 04:54 PM  
 BUN/Creatinine ratio 18 07/18/2018 04:54 PM  
 GFR est AA 87 07/18/2018 04:54 PM  
 GFR est non-AA 76 07/18/2018 04:54 PM  
 Calcium 9.3 07/18/2018 04:54 PM  
 Bilirubin, total 0.4 05/30/2017 08:51 PM  
 AST (SGOT) 15 05/30/2017 08:51 PM  
 Alk. phosphatase 85 05/30/2017 08:51 PM  
 Protein, total 7.5 05/30/2017 08:51 PM  
 Albumin 3.9 05/30/2017 08:51 PM  
 Globulin 3.6 05/30/2017 08:51 PM  
 A-G Ratio 1.1 05/30/2017 08:51 PM  
 ALT (SGPT) 25 05/30/2017 08:51 PM  
  
 
 
 Assessment: 
 
 Assessment:  
 
Diagnoses and all orders for this visit: 1. PSVT (paroxysmal supraventricular tachycardia) ([address] 2. Essential hypertension -     AMB POC EKG ROUTINE W/ 12 LEADS, INTER & REP 3. Dyslipidemia ICD-10-CM ICD-9-CM 1. PSVT (paroxysmal supraventricular tachycardia) (HCC) I47.1 427.0 2. Essential hypertension I10 401.9 AMB POC EKG ROUTINE W/ 12 LEADS, INTER & REP 3. Dyslipidemia E78.5 272.4 Orders Placed This Encounter  AMB POC EKG ROUTINE W/ 12 LEADS, INTER & REP Order Specific Question:   Reason for Exam: Answer:   ROUTINE Plan: 1. PSVT: Tolerating diltiazem 120 mg daily continue 2. Hypertension: Stable 124/62 continue current therapy 3. Hyperlipidemia: Per primary care 4.   Recurrent dizziness with position change awakening in the morning: Pending ENT consult Friday. No obvious findings from cardiology, not orthostatic today. Follow-up in 1 year Anuj Hernandez NP This note was created using voice recognition software. Despite editing, there may be syntax errors. Pt seen and examined in details. Agree with NP A&P. No further palpitations since started on diltiazem.   
 
Maryse Chávez MD
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
## 2018-10-16 NOTE — MR AVS SNAPSHOT
[address] 
[phone] Patient: Yong Calderon MRN: [id number] Visit Information Date & Time Provider Department Dept. Phone Encounter #  
 10/16/2018  9:15 AM Sugar Larose, [address] [place] [address] [facility] [phone] [id number] Follow-up Instructions Return in about 1 year (around 10/16/2019). Routing History Follow-up and Disposition History Your Appointments 1/10/2019  9:30 AM  
ACUTE CARE with MD YVETTE Grey [facility] ([address]) Appt Note: 3 month follow up [address] [phone] [address] [facility] [address] Upcoming Health Maintenance Date Due Shingrix Vaccine Age 50> (1 of 2) 1979 GLAUCOMA SCREENING Q2Y 1994 MEDICARE YEARLY EXAM 2019 DTaP/Tdap/Td series (2 - Td) 2021 Allergies as of 10/16/2018  Review Complete On: 10/16/2018 By: Sugar Larose MD  
  
 Severity Noted Reaction Type Reactions Statins-hmg-coa Reductase Inhibitors  08/15/2017    Unknown (comments) Current Immunizations  Never Reviewed Name Date Influenza High Dose Vaccine PF 10/25/2017 Influenza Vaccine 10/21/2016, 10/1/2016, 10/12/2015, 10/9/2014, 10/9/2014, 2013, 10/16/2012, 10/4/2011 Influenza Vaccine (Tri) Adjuvanted 10/10/2018 Pneumococcal Conjugate (PCV-13) 2015 Pneumococcal Polysaccharide (PPSV-23) 3/1/2011 Pneumococcal Vaccine (Unspecified Type) 3/1/2001 Tdap 2011 Zoster Vaccine, Live 2008 Not reviewed this visit You Were Diagnosed With   
  
 Codes Comments PSVT (paroxysmal supraventricular tachycardia) (HCC)    -  Primary ICD-10-CM: I47.1 ICD-9-CM: 427.0 Essential hypertension     ICD-10-CM: I10 
ICD-9-CM: 401.9 Dyslipidemia     ICD-10-CM: E78.5 ICD-9-CM: 272.4 Vitals BP Pulse Height(growth percentile) Weight(growth percentile) LMP SpO2  
 124/62 (BP 1 Location: Right arm, BP Patient Position: Standing) 77 5' 5\" (1.651 m) 130 lb 6.4 oz (59.1 kg) (LMP Unknown) 97% BMI OB Status Smoking Status 21.7 kg/m2 Postmenopausal Never Smoker Vitals History BMI and BSA Data Body Mass Index Body Surface Area 21.7 kg/m 2 1.65 m 2 Preferred Pharmacy Pharmacy Name Phone [facility] [address] [place] [address] Avenue [phone] Your Updated Medication List  
  
   
This list is accurate as of 10/16/18 10:08 AM.  Always use your most recent med list.  
  
  
  
  
 aspirin delayed-release 81 mg tablet Take 81 mg by mouth daily. dilTIAZem  mg ER capsule Commonly known as:  CARDIZEM CD Take 1 Cap by mouth daily. levocetirizine 5 mg tablet Commonly known as:  XYZAL  
  
 * levothyroxine 50 mcg tablet Commonly known as:  SYNTHROID Take 75 mcg by mouth every seven (7) days. Sunday * levothyroxine 75 mcg tablet Commonly known as:  SYNTHROID Take 1 Tab by mouth Daily (before breakfast). 1 tab M->Sat PRESERVISION AREDS 2 PO Take 1 Tab by mouth two (2) times a day. THERATEARS 0.25 % Drop ophthalmic solution Generic drug:  carboxymethylcellulose sodium Apply 1 Drop to eye two (2) times a day. VITAMIN B-12 500 mcg tablet Generic drug:  cyanocobalamin Take 1,000 mcg by mouth daily. VITAMIN D3 PO Take 1,000 Units by mouth daily. * Notice: This list has 2 medication(s) that are the same as other medications prescribed for you. Read the directions carefully, and ask your doctor or other care provider to review them with you. Prescriptions Sent to Pharmacy Refills  
 dilTIAZem CD (CARDIZEM CD) 120 mg ER capsule 3 Sig: Take 1 Cap by mouth daily.   
 Class: Normal  
 Pharmacy: [facility] [address] [place] [address] #: [phone] Route: Oral  
  
We Performed the Following AMB POC EKG ROUTINE W/ 12 LEADS, INTER & REP [83175 CPT(R)] Follow-up Instructions Return in about 1 year (around 10/16/2019). Introducing [facility]! [employer] introduces [employer] patient portal. Now you can access parts of your medical record, email your doctor's office, and request medication refills online. 1. In your internet browser, go to [email or web address] 2. Click on the First Time User? Click Here link in the Sign In box. You will see the New Member Sign Up page. 3. Enter your [employer] Access Code exactly as it appears below. You will not need to use this code after youve completed the sign-up process. If you do not sign up before the expiration date, you must request a new code. · [employer] Access Code: [email or web address] Expires: 10/16/2018  4:58 PM 
 
4. Enter the last four digits of your Social Security Number (xxxx) and Date of Birth (mm/dd/yyyy) as indicated and click Submit. You will be taken to the next sign-up page. 5. Create a [employer] ID. This will be your [employer] login ID and cannot be changed, so think of one that is secure and easy to remember. 6. Create a [employer] password. You can change your password at any time. 7. Enter your Password Reset Question and Answer. This can be used at a later time if you forget your password. 8. Enter your e-mail address. You will receive e-mail notification when new information is available in [address] 9. Click Sign Up. You can now view and download portions of your medical record. 10. Click the Download Summary menu link to download a portable copy of your medical information. If you have questions, please visit the Frequently Asked Questions section of the [employer] website.  Remember, [employer] is NOT to be used for urgent needs. For medical emergencies, dial 911. Now available from your iPhone and Android! Please provide this summary of care documentation to your next provider. Your primary care clinician is listed as Orville Curry. If you have any questions after today's visit, please call [phone].

## 2019-01-03 ENCOUNTER — OFFICE VISIT (OUTPATIENT)
Dept: INTERNAL MEDICINE CLINIC | Age: 84
End: 2019-01-03

## 2019-01-03 VITALS
TEMPERATURE: 98.3 F | HEART RATE: 98 BPM | OXYGEN SATURATION: 97 % | DIASTOLIC BLOOD PRESSURE: 68 MMHG | SYSTOLIC BLOOD PRESSURE: 138 MMHG | HEIGHT: 65 IN | BODY MASS INDEX: 21.99 KG/M2 | WEIGHT: 132 LBS

## 2019-01-03 DIAGNOSIS — J01.01 ACUTE RECURRENT MAXILLARY SINUSITIS: Primary | ICD-10-CM

## 2019-01-03 RX ORDER — FLUTICASONE PROPIONATE 50 MCG
2 SPRAY, SUSPENSION (ML) NASAL DAILY
Qty: 1 BOTTLE | Refills: 0 | Status: SHIPPED | OUTPATIENT
Start: 2019-01-03 | End: 2019-03-15

## 2019-01-03 RX ORDER — LEVOTHYROXINE SODIUM 75 UG/1
75 TABLET ORAL
COMMUNITY
End: 2019-08-21 | Stop reason: SDUPTHER

## 2019-01-03 RX ORDER — CEFUROXIME AXETIL 250 MG/1
250 TABLET ORAL 2 TIMES DAILY
Qty: 20 TAB | Refills: 0 | Status: SHIPPED | OUTPATIENT
Start: 2019-01-03 | End: 2019-01-14 | Stop reason: ALTCHOICE

## 2019-01-03 RX ORDER — LEVOTHYROXINE SODIUM 50 UG/1
50 TABLET ORAL
COMMUNITY
End: 2021-02-01 | Stop reason: ALTCHOICE

## 2019-01-03 NOTE — PROGRESS NOTES
Reviewed record in preparation for visit and have obtained necessary documentation. Identified pt with two pt identifiers(name and ).     Chief Complaint   Patient presents with    Nasal Congestion    Ear Pain     left    Dizziness        Coordination of Care Questionnaire:  :     1) Have you been to an emergency room, urgent care clinic since your last visit? no    Hospitalized since your last visit? no              2) Have you seen or consulted any other health care providers outside of [address] [place] [address] since your last visit? no

## 2019-01-03 NOTE — PROGRESS NOTES
Subjective:     Ms. Rosa Tran comes to the office today complaining of bilateral maxillary pain, as well as frontal sinus pain. She has noted some significant postnasal drainage and some mild cough. She has had no fever or chills, but has noted some fullness in her neck as well. She has been using some saltwater nasal cleansing without much success. She has a sinusitis and we will treat her with an antibiotic and Flonase. She is already scheduled for follow up next week anyway.         Patient Active Problem List    Diagnosis Date Noted    Adult onset hypothyroidism 08/15/2017     Priority: 1 - One    Dyslipidemia 08/15/2017     Priority: 1 - One    Hypertension 08/15/2017     Priority: 1 - One    Anxiety 08/15/2017     Priority: 2 - Two    Mild cognitive impairment with memory loss 08/15/2017     Priority: 2 - Two    DJD (degenerative joint disease) 08/29/2017     Priority: 3 - Three    Abdominal bloating 08/15/2017     Priority: 3 - Three    ASHD (arteriosclerotic heart disease) 08/15/2017     Priority: 3 - Three    History of TIA (transient ischemic attack) 08/15/2017     Priority: 3 - Three    Near syncope 08/15/2017     Priority: 3 - Three    ARMD (age related macular degeneration) 08/15/2017     Priority: 4 - Four    Frequent UTI 08/15/2017     Priority: 4 - Four    Loss of hair 08/15/2017     Priority: 4 - Four    Urinary incontinence 08/15/2017     Priority: 4 - Four    Villous adenoma 08/15/2017     Priority: 4 - Four    Family history of colon cancer 08/15/2017     Priority: 5 - Five    H/O basal cell carcinoma excision 08/15/2017     Priority: 5 - Five    Herpes zoster without complication 88/48/9305     Priority: 5 - Five    [place] (hard of hearing) 08/15/2017     Priority: 5 - Five    Leg cramps 08/15/2017     Priority: 5 - Five    Medial meniscus tear 08/15/2017     Priority: 5 - Five    Osteoporosis 08/15/2017     Priority: 5 - Five    Vitamin D deficiency 08/15/2017     Priority: 5 - Five    Orthostatic hypotension 08/28/2018    PE (physical exam), annual 11/29/2017     Past Surgical History:   Procedure Laterality Date    ABDOMEN SURGERY PROC UNLISTED      colon resection    HX CATARACT REMOVAL Bilateral     HX HEENT      partial thyroidectomy    HX HEENT  02/02/2017    redo neck exploration w/completion thyroidectomy    HX OTHER SURGICAL      skin cancer removed    HX TONSILLECTOMY      HX UROLOGICAL      Contigen injections    KS COLONOSCOPY FLX DX W/COLLJ SPEC WHEN PFRMD  3/13/2013           Social History     Tobacco Use    Smoking status: Never Smoker    Smokeless tobacco: Never Used   Substance Use Topics    Alcohol use: No     Allergies   Allergen Reactions    Statins-Hmg-Coa Reductase Inhibitors Unknown (comments)     Outpatient Medications Marked as Taking for the 1/3/19 encounter (Office Visit) with Ebonie Hernandez MD   Medication Sig Dispense Refill    levothyroxine (SYNTHROID) 75 mcg tablet Take 75 mcg by mouth six (6) days a week. M-Sat      levothyroxine (SYNTHROID) 50 mcg tablet Take 50 mcg by mouth every seven (7) days. Sunday      cefUROXime (CEFTIN) 250 mg tablet Take 1 Tab by mouth two (2) times a day. 20 Tab 0    fluticasone (FLONASE) 50 mcg/actuation nasal spray 2 Sprays by Both Nostrils route daily. 1 Bottle 0    dilTIAZem CD (CARDIZEM CD) 120 mg ER capsule Take 1 Cap by mouth daily. 90 Cap 3    VIT C/E/ZN/COPPR/LUTEIN/ZEAXAN (PRESERVISION AREDS 2 PO) Take 1 Tab by mouth two (2) times a day.  carboxymethylcellulose sodium (THERATEARS) 0.25 % drop Apply 1 Drop to eye two (2) times a day.  CHOLECALCIFEROL, VITAMIN D3, (VITAMIN D3 PO) Take 1,000 Units by mouth daily.  aspirin delayed-release 81 mg tablet Take 81 mg by mouth daily.  cyanocobalamin (VITAMIN B-12) 500 mcg tablet Take 1,000 mcg by mouth daily.          Review of Systems:  GEN: no weight loss, weight gain, fatigue or night sweats  CV: no PND, orthopnea, or palpitations  Resp: no dyspnea on exertion, no cough  Abd: no nausea, vomiting or diarrhea  Endocrine: no hair loss, excessive thirst or polyuria  Neurological ROS: no TIA or stroke symptoms      Objective:     Visit Vitals  /68 (BP 1 Location: Left arm, BP Patient Position: Sitting)   Pulse 98   Temp 98.3 °F (36.8 °C) (Oral)   Ht 5' 5\" (1.651 m)   Wt 132 lb (59.9 kg)   LMP  (LMP Unknown)   SpO2 97%   BMI 21.97 kg/m²     General:   alert, cooperative and no distress   Eyes: conjunctivae/sclerae clear. PERRL   Mouth:  No oral lesions, no pharyngeal erythema, no exudates   ENT: Mucopurulent post nasal drainage     Heart: S1 and S2 normal,no murmurs noted    Lungs: Clear to auscultation bilaterally, no increased work of breathing   Abdomen: Soft, nontender. Normal bowel sounds   Extremities: No edema or cyanosis                                Assessment/Plan:       ICD-10-CM ICD-9-CM    1. Acute recurrent maxillary sinusitis J01.01 461.0 cefUROXime (CEFTIN) 250 mg tablet      fluticasone (FLONASE) 50 mcg/actuation nasal spray       Orders Placed This Encounter    levothyroxine (SYNTHROID) 75 mcg tablet     Sig: Take 75 mcg by mouth six (6) days a week. -Sat    levothyroxine (SYNTHROID) 50 mcg tablet     Sig: Take 50 mcg by mouth every seven (7) days.     cefUROXime (CEFTIN) 250 mg tablet     Sig: Take 1 Tab by mouth two (2) times a day. Dispense:  20 Tab     Refill:  0    fluticasone (FLONASE) 50 mcg/actuation nasal spray     Si Sprays by Both Nostrils route daily. Dispense:  1 Bottle     Refill:  0       Follow-up Disposition:  Return for As scheduled.

## 2019-01-10 ENCOUNTER — OFFICE VISIT (OUTPATIENT)
Dept: INTERNAL MEDICINE CLINIC | Age: 84
End: 2019-01-10

## 2019-01-10 VITALS
RESPIRATION RATE: 16 BRPM | OXYGEN SATURATION: 98 % | DIASTOLIC BLOOD PRESSURE: 68 MMHG | SYSTOLIC BLOOD PRESSURE: 128 MMHG | WEIGHT: 133.2 LBS | BODY MASS INDEX: 22.19 KG/M2 | HEART RATE: 81 BPM | TEMPERATURE: 97.3 F | HEIGHT: 65 IN

## 2019-01-10 DIAGNOSIS — F41.9 ANXIETY: ICD-10-CM

## 2019-01-10 DIAGNOSIS — I10 ESSENTIAL HYPERTENSION: Primary | ICD-10-CM

## 2019-01-10 DIAGNOSIS — R06.02 SOB (SHORTNESS OF BREATH): ICD-10-CM

## 2019-01-10 LAB
BUN BLD-MCNC: 13 MG/DL (ref 7–17)
CALCIUM BLD-MCNC: 9.7 MG/DL (ref 8.4–10.2)
CHLORIDE BLD-SCNC: 100 MMOL/L (ref 98–107)
CO2 POC: 31 MMOL/L (ref 22–32)
CREAT BLD-MCNC: 0.5 MG/DL (ref 0.7–1.2)
EGFR (POC): 85.8
GLUCOSE POC: 122 MG/DL (ref 65–105)
GRAN# POC: 4.8 K/UL (ref 2–7.8)
GRAN% POC: 78.1 % (ref 37–92)
HCT VFR BLD CALC: 40 % (ref 37–51)
HGB BLD-MCNC: 13.6 G/DL (ref 12–18)
LY# POC: 1.1 K/UL (ref 0.6–4.1)
LY% POC: 18.7 % (ref 10–58.5)
MCH RBC QN: 30.5 PG (ref 26–32)
MCHC RBC-ENTMCNC: 34.1 G/DL (ref 30–36)
MCV RBC: 90 FL (ref 80–97)
MID #, POC: 0.1 K/UL (ref 0–1.8)
MID% POC: 3.2 % (ref 0.1–24)
PLATELET # BLD: 231 K/UL (ref 140–440)
POTASSIUM SERPL-SCNC: 4.9 MMOL/L (ref 3.6–5)
RBC # BLD: 4.47 M/UL (ref 4.2–6.3)
SODIUM SERPL-SCNC: 138 MMOL/L (ref 137–145)
WBC # BLD: 6 K/UL (ref 4.1–10.9)

## 2019-01-10 NOTE — PROGRESS NOTES
1. Have you been to the ER, urgent care clinic since your last visit? Hospitalized since your last visit? No    2. Have you seen or consulted any other health care providers outside of the [address] [place] [address] since your last visit? Include any pap smears or colon screening. No     Chief Complaint   Patient presents with    Hypertension     3 mo.  f/u    Cholesterol Problem

## 2019-01-10 NOTE — PROGRESS NOTES
Subjective:   Won hCester is a 80 y.o. female      Chief Complaint   Patient presents with    Hypertension     3 mo. f/u    Cholesterol Problem        History of present illness:  Ms. Bettie Wright returns in follow up. She has resolved most of the sinus symptoms that she had last visit. She woke up this morning feeling there was something in the back of her throat and then she could not breathe exactly right and is now concerned about a pneumonia. Her chest xray is clear today, as are her lungs on auscultation, however. I think a lot of her symptoms are related to anxiety in this particular instance. She denies other new cardiorespiratory, GI or  symptoms. She continues her thyroid medication as is. She has had no significant dizziness or near syncope on the Diltiazem and that seems to have been a good choice in terms of her medications. She denies any recent palpitations. We will check some of her usual labs and she will follow up in three months' time pending results of the lab.         Patient Active Problem List    Diagnosis Date Noted    Adult onset hypothyroidism 08/15/2017     Priority: 1 - One    Dyslipidemia 08/15/2017     Priority: 1 - One    Hypertension 08/15/2017     Priority: 1 - One    Anxiety 08/15/2017     Priority: 2 - Two    Mild cognitive impairment with memory loss 08/15/2017     Priority: 2 - Two    DJD (degenerative joint disease) 08/29/2017     Priority: 3 - Three    Abdominal bloating 08/15/2017     Priority: 3 - Three    ASHD (arteriosclerotic heart disease) 08/15/2017     Priority: 3 - Three    History of TIA (transient ischemic attack) 08/15/2017     Priority: 3 - Three    Near syncope 08/15/2017     Priority: 3 - Three    ARMD (age related macular degeneration) 08/15/2017     Priority: 4 - Four    Frequent UTI 08/15/2017     Priority: 4 - Four    Loss of hair 08/15/2017     Priority: 4 - Four    Urinary incontinence 08/15/2017     Priority: 4 - Four    Villous adenoma 08/15/2017     Priority: 4 - Four    Family history of colon cancer 08/15/2017     Priority: 5 - Five    H/O basal cell carcinoma excision 08/15/2017     Priority: 5 - Five    Herpes zoster without complication 82/64/1277     Priority: 5 - Five    [place] (hard of hearing) 08/15/2017     Priority: 5 - Five    Leg cramps 08/15/2017     Priority: 5 - Five    Medial meniscus tear 08/15/2017     Priority: 5 - Five    Osteoporosis 08/15/2017     Priority: 5 - Five    Vitamin D deficiency 08/15/2017     Priority: 5 - Five    Orthostatic hypotension 08/28/2018    PE (physical exam), annual 11/29/2017      Past Surgical History:   Procedure Laterality Date    ABDOMEN SURGERY PROC UNLISTED      colon resection    HX CATARACT REMOVAL Bilateral     HX HEENT      partial thyroidectomy    HX HEENT  02/02/2017    redo neck exploration w/completion thyroidectomy    HX OTHER SURGICAL      skin cancer removed    HX TONSILLECTOMY      HX UROLOGICAL      Contigen injections    DC COLONOSCOPY FLX DX W/COLLJ SPEC WHEN PFRMD  3/13/2013           Allergies   Allergen Reactions    Statins-Hmg-Coa Reductase Inhibitors Unknown (comments)      Family History   Problem Relation Age of Onset    Cancer Mother         stomach    Dementia Mother     Hypertension Mother     Hypertension Father     Cancer Sister         Colon Cancer    Heart Disease Brother     Diabetes Brother     Cancer Brother         Colon Cancer    Heart Disease Son       Social History     Socioeconomic History    Marital status:      Spouse name: Not on file    Number of children: Not on file    Years of education: Not on file    Highest education level: Not on file   Social Needs    Financial resource strain: Not on file    Food insecurity - worry: Not on file    Food insecurity - inability: Not on file   M:[employer] needs - medical: Not on file   M:[employer] needs - non-medical: Not on file   Occupational History    Not on file   Tobacco Use    Smoking status: Never Smoker    Smokeless tobacco: Never Used   Substance and Sexual Activity    Alcohol use: No    Drug use: No    Sexual activity: Not on file   Other Topics Concern    Not on file   Social History Narrative    Not on file     Outpatient Medications Marked as Taking for the 1/10/19 encounter (Office Visit) with Peyton Gatica MD   Medication Sig Dispense Refill    levothyroxine (SYNTHROID) 75 mcg tablet Take 75 mcg by mouth six (6) days a week. M-Sat      levothyroxine (SYNTHROID) 50 mcg tablet Take 50 mcg by mouth every seven (7) days. Sunday      cefUROXime (CEFTIN) 250 mg tablet Take 1 Tab by mouth two (2) times a day. 20 Tab 0    fluticasone (FLONASE) 50 mcg/actuation nasal spray 2 Sprays by Both Nostrils route daily. 1 Bottle 0    dilTIAZem CD (CARDIZEM CD) 120 mg ER capsule Take 1 Cap by mouth daily. 90 Cap 3    VIT C/E/ZN/COPPR/LUTEIN/ZEAXAN (PRESERVISION AREDS 2 PO) Take 1 Tab by mouth two (2) times a day.  carboxymethylcellulose sodium (THERATEARS) 0.25 % drop Apply 1 Drop to eye two (2) times a day.  CHOLECALCIFEROL, VITAMIN D3, (VITAMIN D3 PO) Take 1,000 Units by mouth daily.  aspirin delayed-release 81 mg tablet Take 81 mg by mouth daily.  cyanocobalamin (VITAMIN B-12) 500 mcg tablet Take 1,000 mcg by mouth daily. Review of Systems              Constitutional:  She denies fever, weight loss, sweats or fatigue. HEENT:  No blurred or double vision, headache or dizziness. No difficulty with swallowing, taste, speech or smell. Respiratory:  No cough, wheezing or shortness of breath. No sputum production. Cardiac:  Denies chest pain, palpitations, unexplained indigestion, syncope, edema, PND or orthopnea. GI:  No changes in bowel movements, no abdominal pain, no bloating, anorexia, nausea, vomiting or heartburn. :  No frequency or dysuria. Denies incontinence.   Extremities:  No joint pain, stiffness or swelling. Skin:  No recent rashes or mole changes. Neurological:  No numbness, tingling, burning paresthesias or loss of motor strength. No syncope, dizziness, frequent headaches or memory loss. Objective:     Vitals:    01/10/19 0930   BP: 128/68   Pulse: 81   Resp: 16   Temp: 97.3 °F (36.3 °C)   TempSrc: Oral   SpO2: 98%   Weight: 133 lb 3.2 oz (60.4 kg)   Height: 5' 5\" (1.651 m)   PainSc:   0 - No pain       Body mass index is 22.17 kg/m². Physical Examination:              General Appearance:  Well-developed, well-nourished, no acute  distress. HEENT:     Ears:  The TMs and ear canals were clear. Eyes:  The pupillary responses were normal.  Extraocular muscle function intact. Lids and conjunctiva not injected. Neck:  Supple without thyromegaly or adenopathy. No JVD noted. Lungs:  Clear to auscultation and percussion. Cardiac:  Regular rate and rhythm without murmur. GI: nontender w/o mass. Normal BS's. Extremities:  No clubbing, cyanosis or edema. Skin:  No rash or unusual mole changes noted. Neurological:  Grossly normal.            Assessment/Plan:   Impressions:      ICD-10-CM ICD-9-CM    1. Essential hypertension I10 401.9 AMB POC BASIC METABOLIC PANEL   2. Anxiety F41.9 300.00    3. SOB (shortness of breath) R06.02 786.05 AMB POC COMPLETE CBC,AUTOMATED ENTER      XR CHEST PA LAT        Plan:  1. Continue present meds  2. Discussed lifestyle modifications including Na restriction, low carb/fat diet, weight reduction and exercise (at least a walking program). Follow-up Disposition:  Return in about 3 months (around 4/10/2019).       Orders Placed This Encounter    XR CHEST PA LAT     Standing Status:   Future     Standing Expiration Date:   1/11/2020     Order Specific Question:   Reason for Exam     Answer:   Stacey Quinones MD

## 2019-01-11 NOTE — PROGRESS NOTES
Patient informed that Hgb and WBC normal.  Blood sugar, kidney and K+ normal.  Good. No change in Rx.

## 2019-01-14 ENCOUNTER — OFFICE VISIT (OUTPATIENT)
Dept: INTERNAL MEDICINE CLINIC | Age: 84
End: 2019-01-14

## 2019-01-14 ENCOUNTER — TELEPHONE (OUTPATIENT)
Dept: INTERNAL MEDICINE CLINIC | Age: 84
End: 2019-01-14

## 2019-01-14 VITALS
WEIGHT: 133.2 LBS | BODY MASS INDEX: 22.19 KG/M2 | SYSTOLIC BLOOD PRESSURE: 146 MMHG | TEMPERATURE: 97.9 F | OXYGEN SATURATION: 98 % | HEART RATE: 79 BPM | RESPIRATION RATE: 16 BRPM | HEIGHT: 65 IN | DIASTOLIC BLOOD PRESSURE: 72 MMHG

## 2019-01-14 DIAGNOSIS — I10 ESSENTIAL HYPERTENSION: ICD-10-CM

## 2019-01-14 DIAGNOSIS — B37.0 ORAL CANDIDIASIS: Primary | ICD-10-CM

## 2019-01-14 RX ORDER — NYSTATIN 100000 [USP'U]/ML
500000 SUSPENSION ORAL 4 TIMES DAILY
Qty: 200 ML | Refills: 0 | Status: SHIPPED | OUTPATIENT
Start: 2019-01-14 | End: 2019-03-15

## 2019-01-14 RX ORDER — CLOTRIMAZOLE 10 MG/1
10 LOZENGE ORAL; TOPICAL
Qty: 50 TAB | Refills: 0
Start: 2019-01-14 | End: 2019-01-24

## 2019-01-14 NOTE — TELEPHONE ENCOUNTER
[address] called stating Mycostatin Suspension is currently on backorder. Per Dr Ketty Yang change to:  Requested Prescriptions     Pending Prescriptions Disp Refills    clotrimazole (MYCELEX) 10 mg amy 50 Tab 0     Sig: Take 1 Tab by mouth five (5) times daily for 10 days. Allow to dissolve, do not swallow whole.

## 2019-01-14 NOTE — PROGRESS NOTES
Subjective:     Ms. Edson Escobar comes to the office today complaining of a sore throat that began over the last couple days' time. She just finished her Cefdinir, for which she was being treated for bronchitis and sinusitis on Sunday. It would seem odd that she would have a significant infection at this point. She has had no fever or chills. She has had her flu shot. She denies significant cough or sputum production, mostly it is just a sore throat deep in her throat. She appears to have some early oral candidiasis and I think treatment of that should resolve the situation.         Patient Active Problem List    Diagnosis Date Noted    Adult onset hypothyroidism 08/15/2017     Priority: 1 - One    Dyslipidemia 08/15/2017     Priority: 1 - One    Hypertension 08/15/2017     Priority: 1 - One    Anxiety 08/15/2017     Priority: 2 - Two    Mild cognitive impairment with memory loss 08/15/2017     Priority: 2 - Two    DJD (degenerative joint disease) 08/29/2017     Priority: 3 - Three    Abdominal bloating 08/15/2017     Priority: 3 - Three    ASHD (arteriosclerotic heart disease) 08/15/2017     Priority: 3 - Three    History of TIA (transient ischemic attack) 08/15/2017     Priority: 3 - Three    Near syncope 08/15/2017     Priority: 3 - Three    ARMD (age related macular degeneration) 08/15/2017     Priority: 4 - Four    Frequent UTI 08/15/2017     Priority: 4 - Four    Loss of hair 08/15/2017     Priority: 4 - Four    Urinary incontinence 08/15/2017     Priority: 4 - Four    Villous adenoma 08/15/2017     Priority: 4 - Four    Family history of colon cancer 08/15/2017     Priority: 5 - Five    H/O basal cell carcinoma excision 08/15/2017     Priority: 5 - Five    Herpes zoster without complication 89/46/6979     Priority: 5 - Five    Hus[place] (hard of hearing) 08/15/2017     Priority: 5 - Five    Leg cramps 08/15/2017     Priority: 5 - Five    Medial meniscus tear 08/15/2017     Priority: 5 - Five  Osteoporosis 08/15/2017     Priority: 5 - Five    Vitamin D deficiency 08/15/2017     Priority: 5 - Five    Orthostatic hypotension 08/28/2018    PE (physical exam), annual 11/29/2017     Past Surgical History:   Procedure Laterality Date    ABDOMEN SURGERY PROC UNLISTED      colon resection    HX CATARACT REMOVAL Bilateral     HX HEENT      partial thyroidectomy    HX HEENT  02/02/2017    redo neck exploration w/completion thyroidectomy    HX OTHER SURGICAL      skin cancer removed    HX TONSILLECTOMY      HX UROLOGICAL      Contigen injections    MI COLONOSCOPY FLX DX W/COLLJ SPEC WHEN PFRMD  3/13/2013           Social History     Tobacco Use    Smoking status: Never Smoker    Smokeless tobacco: Never Used   Substance Use Topics    Alcohol use: No     Allergies   Allergen Reactions    Statins-Hmg-Coa Reductase Inhibitors Unknown (comments)     Outpatient Medications Marked as Taking for the 1/14/19 encounter (Office Visit) with Amelia Yeager MD   Medication Sig Dispense Refill    nystatin (MYCOSTATIN) 100,000 unit/mL suspension Take 5 mL by mouth four (4) times daily. swish and spit 200 mL 0    levothyroxine (SYNTHROID) 75 mcg tablet Take 75 mcg by mouth six (6) days a week. M-Sat      levothyroxine (SYNTHROID) 50 mcg tablet Take 50 mcg by mouth every seven (7) days. Sunday      fluticasone (FLONASE) 50 mcg/actuation nasal spray 2 Sprays by Both Nostrils route daily. 1 Bottle 0    dilTIAZem CD (CARDIZEM CD) 120 mg ER capsule Take 1 Cap by mouth daily. 90 Cap 3    VIT C/E/ZN/COPPR/LUTEIN/ZEAXAN (PRESERVISION AREDS 2 PO) Take 1 Tab by mouth two (2) times a day.  carboxymethylcellulose sodium (THERATEARS) 0.25 % drop Apply 1 Drop to eye two (2) times a day.  CHOLECALCIFEROL, VITAMIN D3, (VITAMIN D3 PO) Take 1,000 Units by mouth daily.  aspirin delayed-release 81 mg tablet Take 81 mg by mouth daily.       cyanocobalamin (VITAMIN B-12) 500 mcg tablet Take 1,000 mcg by mouth daily.         Review of Systems:  GEN: no weight loss, weight gain, fatigue or night sweats  CV: no PND, orthopnea, or palpitations  Resp: no dyspnea on exertion, no cough  Abd: no nausea, vomiting or diarrhea  Endocrine: no hair loss, excessive thirst or polyuria  Neurological ROS: no TIA or stroke symptoms      Objective:     Visit Vitals  /72 (BP 1 Location: Left arm, BP Patient Position: Sitting)   Pulse 79   Temp 97.9 °F (36.6 °C) (Oral)   Resp 16   Ht 5' 5\" (1.651 m)   Wt 133 lb 3.2 oz (60.4 kg)   LMP  (LMP Unknown)   SpO2 98%   BMI 22.17 kg/m²     General:   alert, cooperative and no distress   Eyes: conjunctivae/sclerae clear. PERRL   Mouth:  No oral lesions, no pharyngeal erythema, no exudates   ENT: Mucopurulent post nasal drainage     Heart: S1 and S2 normal,no murmurs noted    Lungs: Clear to auscultation bilaterally, no increased work of breathing   Abdomen: Soft, nontender. Normal bowel sounds   Extremities: No edema or cyanosis                                Assessment/Plan:       ICD-10-CM ICD-9-CM    1. Oral candidiasis B37.0 112.0    2. Essential hypertension I10 401.9        Orders Placed This Encounter    nystatin (MYCOSTATIN) 100,000 unit/mL suspension     Sig: Take 5 mL by mouth four (4) times daily. swish and spit     Dispense:  200 mL     Refill:  0       Follow-up Disposition:  Return for As scheduled.

## 2019-01-14 NOTE — PROGRESS NOTES
1. Have you been to the ER, urgent care clinic since your last visit? Hospitalized since your last visit? No    2. Have you seen or consulted any other health care providers outside of the [address] [place] [address] since your last visit? Include any pap smears or colon screening.  No     Chief Complaint   Patient presents with    Sore Throat     Sore throat x 2 days

## 2019-01-28 DIAGNOSIS — J31.2 CHRONIC PHARYNGITIS: Primary | ICD-10-CM

## 2019-02-25 ENCOUNTER — TELEPHONE (OUTPATIENT)
Dept: INTERNAL MEDICINE CLINIC | Age: 84
End: 2019-02-25

## 2019-02-25 NOTE — TELEPHONE ENCOUNTER
Patient called stating she hit her ankle on the dresser this morning. It is still swollen, and she has been applying ice to it. She also states she is having difficulty walking now and wants to be seen tomorrow. Patient made an appt for tomorrow. Advised patient to go to the emergency room if her ankle worsens.

## 2019-02-28 ENCOUNTER — TELEPHONE (OUTPATIENT)
Dept: INTERNAL MEDICINE CLINIC | Age: 84
End: 2019-02-28

## 2019-02-28 NOTE — TELEPHONE ENCOUNTER
Patient called asking if she can make herself an appointment with Dr Quezada so she can get a second opinion on the redness in her throat. Okay per Dr Sunday Whitmore.

## 2019-03-11 ENCOUNTER — OFFICE VISIT (OUTPATIENT)
Dept: CARDIOLOGY CLINIC | Age: 84
End: 2019-03-11

## 2019-03-11 VITALS
WEIGHT: 128.9 LBS | HEIGHT: 65 IN | SYSTOLIC BLOOD PRESSURE: 146 MMHG | BODY MASS INDEX: 21.48 KG/M2 | OXYGEN SATURATION: 98 % | HEART RATE: 69 BPM | DIASTOLIC BLOOD PRESSURE: 72 MMHG | RESPIRATION RATE: 18 BRPM

## 2019-03-11 DIAGNOSIS — I49.3 PVC'S (PREMATURE VENTRICULAR CONTRACTIONS): ICD-10-CM

## 2019-03-11 DIAGNOSIS — R00.2 INTERMITTENT PALPITATIONS: Primary | ICD-10-CM

## 2019-03-11 DIAGNOSIS — I10 ESSENTIAL HYPERTENSION: ICD-10-CM

## 2019-03-11 RX ORDER — DILTIAZEM HYDROCHLORIDE 180 MG/1
180 CAPSULE, COATED, EXTENDED RELEASE ORAL DAILY
Qty: 90 CAP | Refills: 3 | Status: SHIPPED | OUTPATIENT
Start: 2019-03-11 | End: 2020-02-23

## 2019-03-11 NOTE — PROGRESS NOTES
Link Apryl DNP, ANP-BC  Subjective/HPI:     Michelle Fish is a 80 y.o. female is here for evaluation of irregular heartbeat. Patient reports she was at dermatology who appreciated an irregular heartbeat. She denies fluttering palpitations lightheadedness or dizziness. Blood pressure was also elevated this weekend. To recall patient has a history of PSVT diagnosed last year placed patient on 120 mg diltiazem and has done well.     PCP Provider  Ami Goldsmith MD  Past Medical History:   Diagnosis Date    Abdominal bloating 8/15/2017    Adult onset hypothyroidism 8/15/2017    Anxiety 8/15/2017    ARMD (age related macular degeneration) 8/15/2017    ASHD (arteriosclerotic heart disease) 8/15/2017    Cancer ([facility] [address]     Colon and Skin    Cervical strain 8/15/2017    Dyslipidemia 8/15/2017    Dysphagia 8/15/2017    Family history of colon cancer 8/15/2017    Frequent UTI 8/15/2017    H/O basal cell carcinoma excision 8/15/2017    Herpes zoster without complication 4/39/4084    History of TIA (transient ischemic attack) 8/15/2017    Pl[place] (hard of hearing) 8/15/2017    Hypertension     Leg cramps 8/15/2017    Loss of hair 8/15/2017    Medial meniscus tear 8/15/2017    Mild cognitive impairment with memory loss 8/15/2017    Near syncope 8/15/2017    Osteoarthritis of both knees 8/15/2017    Osteoporosis 8/15/2017    Other ill-defined conditions(799.89)     hypercholesterolemia    Thyroid disease     Urinary incontinence 8/15/2017    Villous adenoma 8/15/2017    Vitamin D deficiency 8/15/2017      Past Surgical History:   Procedure Laterality Date    ABDOMEN SURGERY PROC UNLISTED      colon resection    HX CATARACT REMOVAL Bilateral     HX HEENT      partial thyroidectomy    HX HEENT  02/02/2017    redo neck exploration w/completion thyroidectomy    HX OTHER SURGICAL      skin cancer removed    HX TONSILLECTOMY      HX UROLOGICAL      Contigen injections    AL COLONOSCOPY FLX DX W/COLLJ SPEC WHEN PFRMD  3/13/2013          Allergies   Allergen Reactions    Statins-Hmg-Coa Reductase Inhibitors Unknown (comments)      Family History   Problem Relation Age of Onset    Cancer Mother         stomach    Dementia Mother     Hypertension Mother     Hypertension Father     Cancer Sister         Colon Cancer    Heart Disease Brother     Diabetes Brother     Cancer Brother         Colon Cancer    Heart Disease Son       Current Outpatient Medications   Medication Sig    levothyroxine (SYNTHROID) 75 mcg tablet Take 75 mcg by mouth six (6) days a week. M-Sat    levothyroxine (SYNTHROID) 50 mcg tablet Take 50 mcg by mouth every seven (7) days. Sunday    fluticasone (FLONASE) 50 mcg/actuation nasal spray 2 Sprays by Both Nostrils route daily.  dilTIAZem CD (CARDIZEM CD) 120 mg ER capsule Take 1 Cap by mouth daily.  VIT C/E/ZN/COPPR/LUTEIN/ZEAXAN (PRESERVISION AREDS 2 PO) Take 1 Tab by mouth two (2) times a day.  carboxymethylcellulose sodium (THERATEARS) 0.25 % drop Apply 1 Drop to eye two (2) times a day.  CHOLECALCIFEROL, VITAMIN D3, (VITAMIN D3 PO) Take 1,000 Units by mouth daily.  aspirin delayed-release 81 mg tablet Take 81 mg by mouth daily.  cyanocobalamin (VITAMIN B-12) 500 mcg tablet Take 1,000 mcg by mouth daily.  nystatin (MYCOSTATIN) 100,000 unit/mL suspension Take 5 mL by mouth four (4) times daily. swish and spit     No current facility-administered medications for this visit.        Vitals:    03/11/19 1056 03/11/19 1114   BP: 140/66 146/72   Pulse: 69    Resp: 18    SpO2: 98%    Weight: 128 lb 14.4 oz (58.5 kg)    Height: 5' 5\" (1.651 m)      Social History     Socioeconomic History    Marital status:      Spouse name: Not on file    Number of children: Not on file    Years of education: Not on file    Highest education level: Not on file   Social Needs    Financial resource strain: Not on file    Food insecurity - worry: Not on file   [facility] - inability: Not on file    Transportation needs - medical: Not on file   [facility] needs - non-medical: Not on file   Occupational History    Not on file   Tobacco Use    Smoking status: Never Smoker    Smokeless tobacco: Never Used   Substance and Sexual Activity    Alcohol use: No    Drug use: No    Sexual activity: Not on file   Other Topics Concern    Not on file   Social History Narrative    Not on file       I have reviewed the nurses notes, vitals, problem list, allergy list, medical history, family, social history and medications. Review of Symptoms:    General: Pt denies excessive weight gain or loss. Pt is able to conduct ADL's  HEENT: Denies blurred vision, headaches, epistaxis and difficulty swallowing. Respiratory: Denies shortness of breath, DODGE, wheezing or stridor. Cardiovascular: Denies precordial pain, + palpitations, edema or PND  Gastrointestinal: Denies poor appetite, indigestion, abdominal pain or blood in stool  Musculoskeletal: Denies pain or swelling from muscles or joints  Neurologic: Denies tremor, paresthesias, or sensory motor disturbance  Skin: Denies rash, itching or texture change. Physical Exam:      General: Well developed, in no acute distress, cooperative and alert  HEENT: No carotid bruits, no JVD, trach is midline. Neck Supple, PEERL, EOM intact. Heart:  Normal S1/S2 negative S3 or S4. Irregularly irregular, no murmur, gallop or rub.   Respiratory: Clear bilaterally x 4, no wheezing or rales  Abdomen:   Soft, non-tender, no masses, bowel sounds are active.   Extremities:  No edema, normal cap refill, no cyanosis, atraumatic. Neuro: A&Ox3, speech clear, gait stable. Skin: Skin color is normal. No rashes or lesions.  Non diaphoretic  Vascular: 2+ pulses symmetric in all extremities    Cardiographics    ECG: sinus with unifocal pvc's   Results for orders placed or performed during the hospital encounter of 05/30/17   EKG, 12 LEAD, INITIAL Result Value Ref Range    Ventricular Rate 68 BPM    Atrial Rate 68 BPM    P-R Interval 180 ms    QRS Duration 84 ms    Q-T Interval 388 ms    QTC Calculation (Bezet) 412 ms    Calculated P Axis 57 degrees    Calculated R Axis 57 degrees    Calculated T Axis 63 degrees    Diagnosis       Normal sinus rhythm  Low voltage QRS  When compared with ECG of 26-JAN-2017 08:20,  No significant change was found  Confirmed by Sammy Rios M.D., Radha Grimaldo ([id number]) on 5/31/2017 3:42:01 PM           Cardiology Labs:  No results found for: CHOL, CHOLX, CHLST, CHOLV, [id number], HDL, LDL, LDLC, DLDLP, Rexanne Glow, [facility], [facility]    Lab Results   Component Value Date/Time    Sodium 136 07/18/2018 04:54 PM    Potassium 5.1 07/20/2018 10:50 AM    Chloride 97 07/18/2018 04:54 PM    CO2 25 07/18/2018 04:54 PM    Anion gap 7 05/30/2017 08:51 PM    Glucose 123 (H) 07/18/2018 04:54 PM    BUN 13 07/18/2018 04:54 PM    Creatinine 0.71 07/18/2018 04:54 PM    BUN/Creatinine ratio 18 07/18/2018 04:54 PM    GFR est AA 87 07/18/2018 04:54 PM    GFR est non-AA 76 07/18/2018 04:54 PM    Calcium 9.3 07/18/2018 04:54 PM    Bilirubin, total 0.4 05/30/2017 08:51 PM    AST (SGOT) 15 05/30/2017 08:51 PM    Alk. phosphatase 85 05/30/2017 08:51 PM    Protein, total 7.5 05/30/2017 08:51 PM    Albumin 3.9 05/30/2017 08:51 PM    Globulin 3.6 05/30/2017 08:51 PM    A-G Ratio 1.1 05/30/2017 08:51 PM    ALT (SGPT) 25 05/30/2017 08:51 PM           Assessment:     Assessment:     Diagnoses and all orders for this visit:    1. Intermittent palpitations  -     AMB POC EKG ROUTINE W/ 12 LEADS, INTER & REP    2. Essential hypertension    3. PVC's (premature ventricular contractions)        ICD-10-CM ICD-9-CM    1. Intermittent palpitations R00.2 785.1 AMB POC EKG ROUTINE W/ 12 LEADS, INTER & REP   2. Essential hypertension I10 401.9    3.  PVC's (premature ventricular contractions) I49.3 427.69      Orders Placed This Encounter    AMB POC EKG ROUTINE W/ 12 LEADS, INTER & REP     Order Specific Question:   Reason for Exam:     Answer:   routine        Plan:     Patient presents sinus rhythm with PVCs asymptomatic and elevated blood pressure. We will adjust diltiazem increasing it to 180 mg daily. Follow-up in 6 months. Merlyn Goltz, NP    This note was created using voice recognition software. Despite editing, there may be syntax errors. Patient seen and examined by me with nurse practitioner. Willie Becerra personally performed all components of the history, physical, and medical decision making and agree with the assessment and plan with minor modifications as noted. 1.  PSVT: stable. 2.  Hypertension: as above.    3.  Hyperlipidemia: Per primary care    Luis E Castle MD

## 2019-03-11 NOTE — PROGRESS NOTES
1. Have you been to the ER, urgent care clinic since your last visit? Hospitalized since your last visit? No.    2. Have you seen or consulted any other health care providers outside of the [address] [place] [address] since your last visit? Include any pap smears or colon screening. Seen by Dermatologist Vinh Castillo. Seen at [facility] for ankle injury.     Chief Complaint   Patient presents with    Follow-up     6 month- HIgh BP this weekend, referred by Dermatology today for irregular heartbeat

## 2019-03-15 ENCOUNTER — OFFICE VISIT (OUTPATIENT)
Dept: CARDIOLOGY CLINIC | Age: 84
End: 2019-03-15

## 2019-03-15 VITALS
HEART RATE: 62 BPM | WEIGHT: 126.9 LBS | RESPIRATION RATE: 16 BRPM | OXYGEN SATURATION: 98 % | SYSTOLIC BLOOD PRESSURE: 144 MMHG | DIASTOLIC BLOOD PRESSURE: 64 MMHG | HEIGHT: 65 IN | BODY MASS INDEX: 21.14 KG/M2

## 2019-03-15 DIAGNOSIS — I49.3 PVC'S (PREMATURE VENTRICULAR CONTRACTIONS): Primary | ICD-10-CM

## 2019-03-15 DIAGNOSIS — I10 ESSENTIAL HYPERTENSION: ICD-10-CM

## 2019-03-15 DIAGNOSIS — E78.5 DYSLIPIDEMIA: ICD-10-CM

## 2019-03-15 RX ORDER — RANITIDINE HCL 75 MG
75 TABLET ORAL DAILY
COMMUNITY
End: 2020-07-30

## 2019-03-15 NOTE — PROGRESS NOTES
1. Have you been to the ER, urgent care clinic since your last visit? Hospitalized since your last visit? No    2. Have you seen or consulted any other health care providers outside of the [address] [place] [address] since your last visit? Include any pap smears or colon screening.  No       Chief Complaint   Patient presents with    Irregular Heart Beat     pt reports heat will skip beats     Hypertension     pt reports high blood pressure reading 170s/70s    Dizziness     pt c/o mild dizziness this morning    Claudication     pt c/o heaviness feeling to both legs

## 2019-03-15 NOTE — PROGRESS NOTES
Subjective/HPI:     Ms. Gladys Mello is a 80 y.o. female is here for routine f/u. She has a PMHx of PVCs, HTN and HLD. She was seen on 3/11/19 for concerns of irregular rhythm, with a known history of PVCs. Her diltiazem was increased to 180 mg daily, which she started taking the day after. She notes today she was at [facility] when she had a sharp pain in her left flank that resolved instantly. While walking back to her car, she felt her legs feel heavy. She had no shortness of breath with exertion, no chest pain symptoms, orthopnea, PND or edema. She notes a slightly elevated BP this morning in the 170s. She has not checked her BP over the past week since increasing diltiazem. She remains concerned about irregular heart beat. Her daughter reports she has been checking her pulse rate constantly, to see if her heart is skipping a beat. She would like to know if there is anything she can do to make this go away. She reports her symptoms are not limiting. She does note that her symptoms improve with exercise.          PCP Provider  Db Webb MD  Past Medical History:   Diagnosis Date    Abdominal bloating 8/15/2017    Adult onset hypothyroidism 8/15/2017    Anxiety 8/15/2017    ARMD (age related macular degeneration) 8/15/2017    ASHD (arteriosclerotic heart disease) 8/15/2017    Cancer ([address]     Colon and Skin    Cervical strain 8/15/2017    Dyslipidemia 8/15/2017    Dysphagia 8/15/2017    Family history of colon cancer 8/15/2017    Frequent UTI 8/15/2017    H/O basal cell carcinoma excision 8/15/2017    Herpes zoster without complication 3/25/2653    History of TIA (transient ischemic attack) 8/15/2017    [place] (hard of hearing) 8/15/2017    Hypertension     Leg cramps 8/15/2017    Loss of hair 8/15/2017    Medial meniscus tear 8/15/2017    Mild cognitive impairment with memory loss 8/15/2017    Near syncope 8/15/2017    Osteoarthritis of both knees 8/15/2017    Osteoporosis 8/15/2017    Other ill-defined conditions(799.89)     hypercholesterolemia    Thyroid disease     Urinary incontinence 8/15/2017    Villous adenoma 8/15/2017    Vitamin D deficiency 8/15/2017      Past Surgical History:   Procedure Laterality Date    ABDOMEN SURGERY PROC UNLISTED      colon resection    HX CATARACT REMOVAL Bilateral     HX HEENT      partial thyroidectomy    HX HEENT  02/02/2017    redo neck exploration w/completion thyroidectomy    HX OTHER SURGICAL      skin cancer removed    HX TONSILLECTOMY      HX UROLOGICAL      Contigen injections    MS COLONOSCOPY FLX DX W/COLLJ SPEC WHEN PFRMD  3/13/2013          Family History   Problem Relation Age of Onset    Cancer Mother         stomach    Dementia Mother     Hypertension Mother     Hypertension Father     Cancer Sister         Colon Cancer    Heart Disease Brother     Diabetes Brother     Cancer Brother         Colon Cancer    Heart Disease Son      Social History     Socioeconomic History    Marital status:      Spouse name: Not on file    Number of children: Not on file    Years of education: Not on file    Highest education level: Not on file   Social Needs    Financial resource strain: Not on file    Food insecurity - worry: Not on file    Food insecurity - inability: Not on file   [employer] needs - medical: Not on file   [employer] needs - non-medical: Not on file   Occupational History    Not on file   Tobacco Use    Smoking status: Never Smoker    Smokeless tobacco: Never Used   Substance and Sexual Activity    Alcohol use: No    Drug use: No    Sexual activity: Not on file   Other Topics Concern    Not on file   Social History Narrative    Not on file       Allergies   Allergen Reactions    Statins-Hmg-Coa Reductase Inhibitors Unknown (comments)        Current Outpatient Medications   Medication Sig    dilTIAZem CD (CARDIZEM CD) 180 mg ER capsule Take 1 Cap by mouth daily.  Increased 3/11/19    levothyroxine (SYNTHROID) 75 mcg tablet Take 75 mcg by mouth six (6) days a week. M-Sat    levothyroxine (SYNTHROID) 50 mcg tablet Take 50 mcg by mouth every seven (7) days. Sunday    VIT C/E/ZN/COPPR/LUTEIN/ZEAXAN (PRESERVISION AREDS 2 PO) Take 1 Tab by mouth two (2) times a day.  carboxymethylcellulose sodium (THERATEARS) 0.25 % drop Apply 1 Drop to eye two (2) times a day.  CHOLECALCIFEROL, VITAMIN D3, (VITAMIN D3 PO) Take 1,000 Units by mouth daily.  aspirin delayed-release 81 mg tablet Take 81 mg by mouth daily.  cyanocobalamin (VITAMIN B-12) 500 mcg tablet Take 1,000 mcg by mouth daily.  raNITIdine (ZANTAC 75) 75 mg tab Take 75 mg by mouth daily. No current facility-administered medications for this visit. I have reviewed the problem list, allergy list, medical history, family, social history and medications. Review of Symptoms:    Review of Systems   Constitutional: Negative for chills, fever and weight loss. HENT: Negative for nosebleeds. Eyes: Negative for blurred vision and double vision. Respiratory: Negative for cough, shortness of breath and wheezing. Cardiovascular: Negative for chest pain, palpitations, orthopnea, leg swelling and PND. Gastrointestinal: Negative for abdominal pain, blood in stool, diarrhea, nausea and vomiting. Musculoskeletal: Negative for joint pain. Skin: Negative for rash. Neurological: Negative for dizziness, tingling and loss of consciousness. Endo/Heme/Allergies: Does not bruise/bleed easily. Physical Exam:      General: Well developed, in no acute distress, cooperative and alert  HEENT: No carotid bruits, no JVD, trach is midline. Neck Supple, PEERL, EOM intact.   Heart:  reg rate and rhythm; normal S1/S2; no murmurs, gallops or rubs.   Respiratory: Clear bilaterally x 4, no wheezing or rales  Abdomen:   Soft, non-tender, no distention, no masses. + BS.   Extremities:  Normal cap refill, no cyanosis, atraumatic. No edema. Neuro: A&Ox3, speech clear, gait stable. Skin: Skin color is normal. No rashes or lesions. Non diaphoretic  Vascular: 2+ pulses symmetric in all extremities    Vitals:    03/15/19 1500 03/15/19 1526 03/15/19 1527   BP: 142/68 150/66 144/64   Pulse: (!) 59 72 62   Resp: 16     SpO2: 98%     Weight: 126 lb 14.4 oz (57.6 kg)     Height: 5' 5\" (1.651 m)         Cardiographics    ECG: sinus rhythm with PVCs  Results for orders placed or performed during the hospital encounter of 05/30/17   EKG, 12 LEAD, INITIAL   Result Value Ref Range    Ventricular Rate 68 BPM    Atrial Rate 68 BPM    P-R Interval 180 ms    QRS Duration 84 ms    Q-T Interval 388 ms    QTC Calculation (Bezet) 412 ms    Calculated P Axis 57 degrees    Calculated R Axis 57 degrees    Calculated T Axis 63 degrees    Diagnosis       Normal sinus rhythm  Low voltage QRS  When compared with ECG of 26-JAN-2017 08:20,  No significant change was found  Confirmed by Mihaela Jett M.D., Ruth Walls ([id number]) on 5/31/2017 3:42:01 PM         Cardiology Labs:  No results found for: CHOL, CHOLX, CHLST, CHOLV, 211122, HDL, LDL, LDLC, DLDLP, [place] [facility], [facility], [facility]    Lab Results   Component Value Date/Time    Sodium 136 07/18/2018 04:54 PM    Potassium 5.1 07/20/2018 10:50 AM    Chloride 97 07/18/2018 04:54 PM    CO2 25 07/18/2018 04:54 PM    Anion gap 7 05/30/2017 08:51 PM    Glucose 123 (H) 07/18/2018 04:54 PM    BUN 13 07/18/2018 04:54 PM    Creatinine 0.71 07/18/2018 04:54 PM    BUN/Creatinine ratio 18 07/18/2018 04:54 PM    GFR est AA 87 07/18/2018 04:54 PM    GFR est non-AA 76 07/18/2018 04:54 PM    Calcium 9.3 07/18/2018 04:54 PM    Bilirubin, total 0.4 05/30/2017 08:51 PM    AST (SGOT) 15 05/30/2017 08:51 PM    Alk.  phosphatase 85 05/30/2017 08:51 PM    Protein, total 7.5 05/30/2017 08:51 PM    Albumin 3.9 05/30/2017 08:51 PM    Globulin 3.6 05/30/2017 08:51 PM    A-G Ratio 1.1 05/30/2017 08:51 PM    ALT (SGPT) 25 05/30/2017 08:51 PM Assessment:     Assessment:       ICD-10-CM ICD-9-CM    1. PVC's (premature ventricular contractions) I49.3 427.69 AMB POC EKG ROUTINE W/ 12 LEADS, INTER & REP   2. Essential hypertension I10 401.9    3. Dyslipidemia E78.5 272.4         Plan:     1. PVC's (premature ventricular contractions)  With known PVCs, with recently increased Diltiazem dose to 180 mg daily. Reassured that at this time, her PVCs are benign, and we will continue with rate control therapies with Diltiazem. - AMB POC EKG ROUTINE W/ 12 LEADS, INTER & REP    2. Essential hypertension  BP controlled today; she will monitor her BP daily for the next week. If BP > 140/90, then she will call for further adjustment. Discussed low sodium diet and exercise. 3. Dyslipidemia  Continue low fat, low cholesterol diet. Lipids managed by PCP.       Sixto Ruiz NP

## 2019-04-11 ENCOUNTER — OFFICE VISIT (OUTPATIENT)
Dept: INTERNAL MEDICINE CLINIC | Age: 84
End: 2019-04-11

## 2019-04-11 VITALS
DIASTOLIC BLOOD PRESSURE: 62 MMHG | TEMPERATURE: 97.8 F | HEIGHT: 65 IN | WEIGHT: 128.2 LBS | BODY MASS INDEX: 21.36 KG/M2 | HEART RATE: 75 BPM | OXYGEN SATURATION: 97 % | SYSTOLIC BLOOD PRESSURE: 128 MMHG

## 2019-04-11 DIAGNOSIS — E03.8 ADULT ONSET HYPOTHYROIDISM: ICD-10-CM

## 2019-04-11 DIAGNOSIS — I49.3 FREQUENT UNIFOCAL PVCS: ICD-10-CM

## 2019-04-11 DIAGNOSIS — G31.84 MILD COGNITIVE IMPAIRMENT WITH MEMORY LOSS: ICD-10-CM

## 2019-04-11 DIAGNOSIS — I10 ESSENTIAL HYPERTENSION: Primary | ICD-10-CM

## 2019-04-11 PROBLEM — K21.9 LARYNGOPHARYNGEAL REFLUX (LPR): Status: ACTIVE | Noted: 2019-04-11

## 2019-04-11 PROBLEM — J33.9 NASAL POLYP: Status: ACTIVE | Noted: 2019-04-11

## 2019-04-11 LAB
T4 FREE SERPL-MCNC: 1.25 NG/DL (ref 0.58–2.3)
TSH SERPL DL<=0.05 MIU/L-ACNC: 4.04 UIU/ML (ref 0.34–5.6)

## 2019-04-11 NOTE — PROGRESS NOTES
Subjective:   Sho Haro is a 80 y.o. female      Chief Complaint   Patient presents with    Cholesterol Problem    Hypertension        History of present illness:  Ms. Cyndy Rivera returns in follow up. She has a variety of concerns today, but since last visit here she has seen Dr. Emilee Merritt and is scheduled to have a nasal polyp removed the first of next week. It sounds as though he has also told her that she has LPR and he prescribed some Zantac. She was taking this regularly for a while, but then felt as though she was feeling bad on it and therefore has stopped it. I told her she could continue this on an as needed basis when she has irritation of her throat, however. She has noted some mild increased lower extremity edema recently, but had seen Dr. Jadyn Lama recently because of palpitations. She was noted to have PVCs and her blood pressure was noted to be elevated and he increased her Cardizem to the 180 capsule daily. This could certainly cause some mild lower extremity edema. Her blood pressure is improved. She is still having issues with a sensation of skipping beats at times and this still seems to be PVCs. I have tried to convince her that this is a benign entity and that she should not worry about it. I did also tell her that if her thyroid is off it could be creating more of these, so we will check that today. Her blood pressure is excellent otherwise. She can return to the Zantac as needed if she wants. We will follow up in August for a comprehensive examination. I do not see much else that we would change at this point.         Patient Active Problem List    Diagnosis Date Noted    Adult onset hypothyroidism 08/15/2017     Priority: 1 - One    Dyslipidemia 08/15/2017     Priority: 1 - One    Hypertension 08/15/2017     Priority: 1 - One    Laryngopharyngeal reflux (LPR) 04/11/2019     Priority: 2 - Two    Anxiety 08/15/2017     Priority: 2 - Two    Mild cognitive impairment with memory loss 08/15/2017     Priority: 2 - Two    Nasal polyp 04/11/2019     Priority: 3 - Three    Frequent unifocal PVCs 04/11/2019     Priority: 3 - Three    DJD (degenerative joint disease) 08/29/2017     Priority: 3 - Three    Abdominal bloating 08/15/2017     Priority: 3 - Three    ASHD (arteriosclerotic heart disease) 08/15/2017     Priority: 3 - Three    History of TIA (transient ischemic attack) 08/15/2017     Priority: 3 - Three    Near syncope 08/15/2017     Priority: 3 - Three    ARMD (age related macular degeneration) 08/15/2017     Priority: 4 - Four    Frequent UTI 08/15/2017     Priority: 4 - Four    Loss of hair 08/15/2017     Priority: 4 - Four    Urinary incontinence 08/15/2017     Priority: 4 - Four    Villous adenoma 08/15/2017     Priority: 4 - Four    Family history of colon cancer 08/15/2017     Priority: 5 - Five    H/O basal cell carcinoma excision 08/15/2017     Priority: 5 - Five    Herpes zoster without complication 43/91/0609     Priority: 5 - Five    [place] (hard of hearing) 08/15/2017     Priority: 5 - Five    Leg cramps 08/15/2017     Priority: 5 - Five    Medial meniscus tear 08/15/2017     Priority: 5 - Five    Osteoporosis 08/15/2017     Priority: 5 - Five    Vitamin D deficiency 08/15/2017     Priority: 5 - Five    PVC's (premature ventricular contractions) 03/15/2019    Orthostatic hypotension 08/28/2018    PE (physical exam), annual 11/29/2017      Past Surgical History:   Procedure Laterality Date    ABDOMEN SURGERY PROC UNLISTED      colon resection    HX CATARACT REMOVAL Bilateral     HX HEENT      partial thyroidectomy    HX HEENT  02/02/2017    redo neck exploration w/completion thyroidectomy    HX OTHER SURGICAL      skin cancer removed    HX TONSILLECTOMY      HX UROLOGICAL      Contigen injections    DE COLONOSCOPY FLX DX W/COLLJ SPEC WHEN PFRMD  3/13/2013           Allergies   Allergen Reactions    Statins-Hmg-Coa Reductase Inhibitors Unknown (comments) Family History   Problem Relation Age of Onset   [facility] Cancer Mother         stomach    Dementia Mother     Hypertension Mother     Hypertension Father     Cancer Sister         Colon Cancer    Heart Disease Brother     Diabetes Brother     Cancer Brother         Colon Cancer    Heart Disease Son       Social History     Socioeconomic History    Marital status:      Spouse name: Not on file    Number of children: Not on file    Years of education: Not on file    Highest education level: Not on file   Occupational History    Not on file   Social Needs    Financial resource strain: Not on file    Food insecurity:     Worry: Not on file     Inability: Not on file    Transportation needs:     Medical: Not on file     Non-medical: Not on file   Tobacco Use    Smoking status: Never Smoker    Smokeless tobacco: Never Used   Substance and Sexual Activity    Alcohol use: No    Drug use: No    Sexual activity: Not on file   Lifestyle    Physical activity:     Days per week: Not on file     Minutes per session: Not on file    Stress: Not on file   Relationships    Social connections:     Talks on phone: Not on file     Gets together: Not on file     Attends [employer] service: Not on file     Active member of club or organization: Not on file     Attends meetings of clubs or organizations: Not on file     Relationship status: Not on file    Intimate partner violence:     Fear of current or ex partner: Not on file     Emotionally abused: Not on file     Physically abused: Not on file     Forced sexual activity: Not on file   Other Topics Concern    Not on file   Social History Narrative    Not on file     Outpatient Medications Marked as Taking for the 4/11/19 encounter (Office Visit) with Raleigh Armstrong MD   Medication Sig Dispense Refill    dilTIAZem CD (CARDIZEM CD) 180 mg ER capsule Take 1 Cap by mouth daily.  Increased 3/11/19 90 Cap 3    levothyroxine (SYNTHROID) 75 mcg tablet Take 75 mcg by mouth six (6) days a week. M-Sat      levothyroxine (SYNTHROID) 50 mcg tablet Take 50 mcg by mouth every seven (7) days. Sunday      VIT C/E/ZN/COPPR/LUTEIN/ZEAXAN (PRESERVISION AREDS 2 PO) Take 1 Tab by mouth two (2) times a day.  carboxymethylcellulose sodium (THERATEARS) 0.25 % drop Apply 1 Drop to eye two (2) times a day.  CHOLECALCIFEROL, VITAMIN D3, (VITAMIN D3 PO) Take 1,000 Units by mouth daily.  aspirin delayed-release 81 mg tablet Take 81 mg by mouth daily.  cyanocobalamin (VITAMIN B-12) 500 mcg tablet Take 1,000 mcg by mouth daily. Review of Systems              Constitutional:  She denies fever, weight loss, sweats or fatigue. HEENT:  No blurred or double vision, headache or dizziness. No difficulty with swallowing, taste, speech or smell. Respiratory:  No cough, wheezing or shortness of breath. No sputum production. Cardiac:  Denies chest pain, palpitations, unexplained indigestion, syncope, edema, PND or orthopnea. GI:  No changes in bowel movements, no abdominal pain, no bloating, anorexia, nausea, vomiting or heartburn. :  No frequency or dysuria. Denies incontinence. Extremities:  No joint pain, stiffness or swelling. Skin:  No recent rashes or mole changes. Neurological:  No numbness, tingling, burning paresthesias or loss of motor strength. No syncope, dizziness, frequent headaches or memory loss. Objective:     Vitals:    04/11/19 0923   BP: 128/62   Pulse: 75   Temp: 97.8 °F (36.6 °C)   TempSrc: Oral   SpO2: 97%   Weight: 128 lb 3.2 oz (58.2 kg)   Height: 5' 5\" (1.651 m)   PainSc:   0 - No pain       Body mass index is 21.33 kg/m². Physical Examination:              General Appearance:  Well-developed, well-nourished, no acute  distress. HEENT:     Ears:  The TMs and ear canals were clear. Eyes:  The pupillary responses were normal.  Extraocular muscle function intact. Lids and conjunctiva not injected.     Neck: Supple without thyromegaly or adenopathy. No JVD noted. Lungs:  Clear to auscultation and percussion. Cardiac:  Regular rate and rhythm. Occasional ectopicr. GI: nontender w/o mass. Normal BS's. Extremities:  No clubbing, cyanosis or edema. Skin:  No rash or unusual mole changes noted. Neurological:  Grossly normal.            Assessment/Plan:   Impressions:      ICD-10-CM ICD-9-CM    1. Essential hypertension I10 401.9 TSH 3RD GENERATION      T4, FREE   2. Adult onset hypothyroidism E03.8 244.8 TSH 3RD GENERATION      T4, FREE   3. Mild cognitive impairment with memory loss G31.84 331.83 TSH 3RD GENERATION     780.93 T4, FREE   4. Frequent unifocal PVCs I49.3 427.69 TSH 3RD GENERATION      T4, FREE        Plan:  1. Continue present meds  2. Discussed lifestyle modifications including Na restriction, low carb/fat diet and exercise (at least a walking program). Follow-up and Dispositions    · Return in about 4 months (around 8/11/2019) for CPE.            Orders Placed This Encounter    TSH 3RD GENERATION ([facility])    T4, FREE ([facility])       Glory Issa MD

## 2019-04-11 NOTE — PROGRESS NOTES
Chief Complaint   Patient presents with    Cholesterol Problem    Hypertension       Depression Risk Factor Screening:     3 most recent PHQ Screens 3/11/2019   Little interest or pleasure in doing things Not at all   Feeling down, depressed, irritable, or hopeless Not at all   Total Score PHQ 2 0       Functional Ability and Level of Safety:     Activities of Daily Living  ADL Assessment 2018   Feeding yourself No Help Needed   Getting from bed to chair No Help Needed   Getting dressed No Help Needed   Bathing or showering No Help Needed   Walk across the room (includes cane/walker) No Help Needed   Using the telphone No Help Needed   Taking your medications No Help Needed   Preparing meals No Help Needed   Managing money (expenses/bills) No Help Needed   Moderately strenuous housework (laundry) No Help Needed   Shopping for personal items (toiletries/medicines) No Help Needed   Shopping for groceries No Help Needed   Driving No Help Needed   Climbing a flight of stairs No Help Needed   Getting to places beyond walking distances No Help Needed       Fall Risk  Fall Risk Assessment, last 12 mths 3/11/2019   Able to walk? Yes   Fall in past 12 months? No   Fall with injury? -   Number of falls in past 12 months -   Fall Risk Score -       Abuse Screen  Abuse Screening Questionnaire 3/11/2019   Do you ever feel afraid of your partner? N   Are you in a relationship with someone who physically or mentally threatens you? N   Is it safe for you to go home? Y     Reviewed record in preparation for visit and have obtained necessary documentation. Identified pt with two pt identifiers(name and ).     Chief Complaint   Patient presents with    Cholesterol Problem    Hypertension      Wt Readings from Last 3 Encounters:   19 128 lb 3.2 oz (58.2 kg)   03/15/19 126 lb 14.4 oz (57.6 kg)   19 128 lb 14.4 oz (58.5 kg)     Temp Readings from Last 3 Encounters:   19 97.8 °F (36.6 °C) (Oral)   19 97.9 °F (36.6 °C) (Oral)   01/10/19 97.3 °F (36.3 °C) (Oral)     BP Readings from Last 3 Encounters:   04/11/19 128/62   03/15/19 144/64   03/11/19 146/72     Pulse Readings from Last 3 Encounters:   04/11/19 75   03/15/19 62   03/11/19 69       Coordination of Care Questionnaire:  :     1) Have you been to an emergency room, urgent care clinic since your last visit? No     Hospitalized since your last visit? No     When:  Where:  Diagnosis:          2) Have you seen or consulted any other health care providers outside of [address] [place] [address] since your last visit?  Yes Dr Candido Biggs and Dr Mario Puga            Patient Care Team   Patient Care Team:  Sky Willingham MD as PCP - General (Internal Medicine)  Valentina Russ MD as Surgeon (General Surgery)  Hema Cote MD (Cardiology)  Kevin Vergara NP (Nurse Practitioner)

## 2019-04-15 ENCOUNTER — TELEPHONE (OUTPATIENT)
Dept: INTERNAL MEDICINE CLINIC | Age: 84
End: 2019-04-15

## 2019-04-15 NOTE — TELEPHONE ENCOUNTER
Ms. Meliton FELIPAciara called and said that she got the 1st shingrix shot 3/10/2019. Please add this to my chart.

## 2019-04-15 NOTE — TELEPHONE ENCOUNTER
Spoke with patient and advised her to have her pharmacy send us documentation of Shingrix vaccination. Patient states she will call them and have them send it over.

## 2019-04-19 ENCOUNTER — TELEPHONE (OUTPATIENT)
Dept: CARDIOLOGY CLINIC | Age: 84
End: 2019-04-19

## 2019-04-19 NOTE — TELEPHONE ENCOUNTER
Patient has been off of her aspirin regimen for 2 weeks now is it really necessary for her to be on it. She has heard the new reports out about taking aspirin and wants Dr Plascencia's opinion on this.     [phone]    Thanks  Jose Carlos Zuñiga

## 2019-04-19 NOTE — TELEPHONE ENCOUNTER
Message   Received: Today   Message Contents   MD Rosalio Turcios LPN   Caller: Unspecified (Today, 10:47 AM)             She does not need it and can stop. Called pt,verified pt with two pt identifiers, told pt that  has advised she does not need to take an ASA at this time. Pt verbalized understanding.

## 2019-06-14 ENCOUNTER — TELEPHONE (OUTPATIENT)
Dept: INTERNAL MEDICINE CLINIC | Age: 84
End: 2019-06-14

## 2019-06-14 ENCOUNTER — LAB ONLY (OUTPATIENT)
Dept: INTERNAL MEDICINE CLINIC | Age: 84
End: 2019-06-14

## 2019-06-14 DIAGNOSIS — I10 ESSENTIAL HYPERTENSION: Primary | ICD-10-CM

## 2019-06-14 LAB
A-G RATIO,AGRAT: 1.7 RATIO
ALBUMIN SERPL-MCNC: 4.2 G/DL (ref 3.9–5.4)
ALP SERPL-CCNC: 85 U/L (ref 38–126)
ALT SERPL-CCNC: 18 U/L (ref 9–52)
ANION GAP SERPL CALC-SCNC: 7 MMOL/L
AST SERPL W P-5'-P-CCNC: 23 U/L (ref 14–36)
BILIRUB SERPL-MCNC: 0.5 MG/DL (ref 0.2–1.3)
BUN SERPL-MCNC: 13 MG/DL (ref 7–17)
BUN/CREATININE RATIO,BUCR: 26 RATIO
CALCIUM SERPL-MCNC: 9.4 MG/DL (ref 8.4–10.2)
CHLORIDE SERPL-SCNC: 101 MMOL/L (ref 98–107)
CO2 SERPL-SCNC: 31 MMOL/L (ref 22–32)
CREAT SERPL-MCNC: 0.5 MG/DL (ref 0.7–1.2)
ERYTHROCYTE [DISTWIDTH] IN BLOOD BY AUTOMATED COUNT: 12.9 %
GLOBULIN,GLOB: 2.5
GLUCOSE SERPL-MCNC: 90 MG/DL (ref 65–105)
HCT VFR BLD AUTO: 41 % (ref 37–51)
HGB BLD-MCNC: 14.1 G/DL (ref 12–18)
LYMPHOCYTES ABSOLUTE: 2.2 K/UL (ref 0.6–4.1)
LYMPHOCYTES NFR BLD: 31.5 % (ref 10–58.5)
MCH RBC QN AUTO: 30.7 PG (ref 26–32)
MCHC RBC AUTO-ENTMCNC: 34.4 G/DL (ref 30–36)
MCV RBC AUTO: 89.2 FL (ref 80–97)
MONOCYTES ABS-DIF,2141: 0.4 K/UL (ref 0–1.8)
MONOCYTES NFR BLD: 6.1 % (ref 0.1–24)
NEUTROPHILS # BLD: 62.4 % (ref 37–92)
NEUTROPHILS ABS,2156: 4.4 K/UL (ref 2–7.8)
PLATELET # BLD AUTO: 271 K/UL (ref 140–440)
PMV BLD AUTO: 8.1 FL
POTASSIUM SERPL-SCNC: 4.4 MMOL/L (ref 3.6–5)
PROT SERPL-MCNC: 6.7 G/DL (ref 6.3–8.2)
RBC # BLD AUTO: 4.6 M/UL (ref 4.2–6.3)
SODIUM SERPL-SCNC: 139 MMOL/L (ref 137–145)
WBC # BLD AUTO: 7 K/UL (ref 4.1–10.9)

## 2019-06-14 NOTE — TELEPHONE ENCOUNTER
Patient called with c/o not feeling good, no energy, and has moments of dizziness that comes and goes. She is requesting to have labs done to see what could be the cause. Discussed with Dr. Sharan Jean Baptiste. Per read back verbal order from Dr. Sharan Jean Baptiste, order CMP, TSH, CBC, and schedule an appt for patient to be seen next week. Patient notified. Patient to come in at 2pm today for lab work.

## 2019-06-18 ENCOUNTER — TELEPHONE (OUTPATIENT)
Dept: INTERNAL MEDICINE CLINIC | Age: 84
End: 2019-06-18

## 2019-06-18 LAB — TSH SERPL DL<=0.05 MIU/L-ACNC: 2.49 UIU/ML (ref 0.34–5.6)

## 2019-06-18 NOTE — TELEPHONE ENCOUNTER
Left message at phone number(s) listed in chart for patient to call the office at [phone] to discuss lab results.

## 2019-06-19 NOTE — PROGRESS NOTES
PHI verified. Patient informed that Dr. Avtar Melendrez has reviewed lab results and stated All her labs normal.  Thyroid, hgb, K+, kidney and liver. How doing? Patient states she is doing better, but does state she has irregular heart beat with exertion. Also, she states she has a knot on her neck that is hard and leaking white drainage with a foul odor.  Appt scheduled for patient to see Joseluis tomorrow at 11am.

## 2019-06-20 ENCOUNTER — OFFICE VISIT (OUTPATIENT)
Dept: INTERNAL MEDICINE CLINIC | Age: 84
End: 2019-06-20

## 2019-06-20 VITALS
HEART RATE: 56 BPM | TEMPERATURE: 98 F | DIASTOLIC BLOOD PRESSURE: 50 MMHG | BODY MASS INDEX: 20.25 KG/M2 | RESPIRATION RATE: 16 BRPM | HEIGHT: 66 IN | WEIGHT: 126 LBS | OXYGEN SATURATION: 96 % | SYSTOLIC BLOOD PRESSURE: 136 MMHG

## 2019-06-20 DIAGNOSIS — L02.12 BOIL, NECK: Primary | ICD-10-CM

## 2019-06-20 RX ORDER — CEPHALEXIN 250 MG/1
500 CAPSULE ORAL 3 TIMES DAILY
Qty: 21 CAP | Refills: 0 | Status: SHIPPED | OUTPATIENT
Start: 2019-06-20 | End: 2019-06-25 | Stop reason: ALTCHOICE

## 2019-06-20 NOTE — PROGRESS NOTES
Subjective:  Ms. Bradley Cockayne is a pleasant 80year old lady, patient of Dr. Jeff Cuevas, who comes in today for evaluation of a small knot she has developed along the incision of her thyroidectomy. This was done in February of 2017 by Dr. Eduardo Peres. She has done extremely well until just recently when she started to notice a small knot along the incision. Two days ago she squeezed a foul odor substance from the knot. Since then there has been no further drainage. It is painless. She denies chills or fever. She denies systemic symptoms.     Past Medical History:   Diagnosis Date    Abdominal bloating 8/15/2017    Adult onset hypothyroidism 8/15/2017    Anxiety 8/15/2017    ARMD (age related macular degeneration) 8/15/2017    ASHD (arteriosclerotic heart disease) 8/15/2017    Cancer ([facility] [address]     Colon and Skin    Cervical strain 8/15/2017    Dyslipidemia 8/15/2017    Dysphagia 8/15/2017    Family history of colon cancer 8/15/2017    Frequent UTI 8/15/2017    H/O basal cell carcinoma excision 8/15/2017    Herpes zoster without complication 8/55/5156    History of TIA (transient ischemic attack) 8/15/2017    [place] (hard of hearing) 8/15/2017    Hypertension     Leg cramps 8/15/2017    Loss of hair 8/15/2017    Medial meniscus tear 8/15/2017    Mild cognitive impairment with memory loss 8/15/2017    Near syncope 8/15/2017    Osteoarthritis of both knees 8/15/2017    Osteoporosis 8/15/2017    Other ill-defined conditions(799.89)     hypercholesterolemia    Thyroid disease     Urinary incontinence 8/15/2017    Villous adenoma 8/15/2017    Vitamin D deficiency 8/15/2017     Past Surgical History:   Procedure Laterality Date    ABDOMEN SURGERY PROC UNLISTED      colon resection    HX CATARACT REMOVAL Bilateral     HX HEENT      partial thyroidectomy    HX HEENT  02/02/2017    redo neck exploration w/completion thyroidectomy    HX OTHER SURGICAL      skin cancer removed    HX TONSILLECTOMY  HX UROLOGICAL      Contigen injections    [place] COLONOSCOPY FLX DX W/COLLJ SPEC WHEN PFRMD  3/13/2013            Current Outpatient Medications on File Prior to Visit   Medication Sig Dispense Refill    raNITIdine (ZANTAC 75) 75 mg tab Take 75 mg by mouth daily.  dilTIAZem CD (CARDIZEM CD) 180 mg ER capsule Take 1 Cap by mouth daily. Increased 3/11/19 90 Cap 3    levothyroxine (SYNTHROID) 75 mcg tablet Take 75 mcg by mouth six (6) days a week. M-Sat      levothyroxine (SYNTHROID) 50 mcg tablet Take 50 mcg by mouth every seven (7) days. Sunday      VIT C/E/ZN/COPPR/LUTEIN/ZEAXAN (PRESERVISION AREDS 2 PO) Take 1 Tab by mouth two (2) times a day.  carboxymethylcellulose sodium (THERATEARS) 0.25 % drop Apply 1 Drop to eye two (2) times a day.  CHOLECALCIFEROL, VITAMIN D3, (VITAMIN D3 PO) Take 1,000 Units by mouth daily.  aspirin delayed-release 81 mg tablet Take 81 mg by mouth daily.  cyanocobalamin (VITAMIN B-12) 500 mcg tablet Take 1,000 mcg by mouth daily. No current facility-administered medications on file prior to visit. Allergies   Allergen Reactions    Statins-Hmg-Coa Reductase Inhibitors Unknown (comments)     Physical Examination:  GENERAL:  Pleasant lady in no acute distress. She is alert and oriented. She answers my questions appropriately. VITALS:  BP: initially 141/49, repeated by myself 136/50. P: 56.  R: 16.  T: 98. O2 sat: 96%. HEENT:  Normocephalic, atraumatic. NECK:  She has a well healed incision from previous thyroidectomy. A little bit to the right of her trachea along the incision is a palpable fairly hard nodule measuring about 1 cm. I was unable to express any fluid out of this lesion. There is only very minimal redness. It is painless. No adenopathy. CHEST:  Lungs clear to auscultation, no rales or wheezes. CV:  Heart regular rhythm. EXTREMITIES:  No edema or calf tenderness. Distal pulses were present. Impression:  1.  Suspect she might have a  Retained stitch that has gotten infected. Plan:  1. I have opted to start Keflex 250 mg three times a day for seven days. 2. We have made arrangements for her to follow up with Dr. Darlin Bardales. 3. Certainly should she note any increasing chills, drainage, fever, she will contact us immediately.

## 2019-06-20 NOTE — PROGRESS NOTES
Patricia Fang  Identified pt with two pt identifiers(name and ). Chief Complaint   Patient presents with    Lesion     right leg    Cyst     throat       1. Have you been to the ER, urgent care clinic since your last visit? Hospitalized since your last visit? no    2. Have you seen or consulted any other health care providers outside of the [address] [place] [address] [place] since your last visit? Include any pap smears or colon screening. no      Medication reconciliation up to date and corrected with patient at this time. Today's provider has been notified of reason for visit, vitals and flowsheets obtained on patients. Reviewed record in preparation for visit, huddled with provider and have obtained necessary documentation. Depression Risk Factor Screening:     3 most recent PHQ Screens 3/11/2019   Little interest or pleasure in doing things Not at all   Feeling down, depressed, irritable, or hopeless Not at all   Total Score PHQ 2 0       Functional Ability and Level of Safety:     Activities of Daily Living  ADL Assessment 2018   Feeding yourself No Help Needed   Getting from bed to chair No Help Needed   Getting dressed No Help Needed   Bathing or showering No Help Needed   Walk across the room (includes cane/walker) No Help Needed   Using the telphone No Help Needed   Taking your medications No Help Needed   Preparing meals No Help Needed   Managing money (expenses/bills) No Help Needed   Moderately strenuous housework (laundry) No Help Needed   Shopping for personal items (toiletries/medicines) No Help Needed   Shopping for groceries No Help Needed   Driving No Help Needed   Climbing a flight of stairs No Help Needed   Getting to places beyond walking distances No Help Needed       Fall Risk  Fall Risk Assessment, last 12 mths 3/11/2019   Able to walk? Yes   Fall in past 12 months?  No   Fall with injury? -   Number of falls in past 12 months -   Fall Risk Score -       Abuse Screen  Abuse Screening Questionnaire 3/11/2019   Do you ever feel afraid of your partner? N   Are you in a relationship with someone who physically or mentally threatens you? N   Is it safe for you to go home?  Y           Health Maintenance Due   Topic    GLAUCOMA SCREENING Q2Y        Wt Readings from Last 3 Encounters:   06/20/19 126 lb (57.2 kg)   04/11/19 128 lb 3.2 oz (58.2 kg)   03/15/19 126 lb 14.4 oz (57.6 kg)     Temp Readings from Last 3 Encounters:   06/20/19 98 °F (36.7 °C) (Oral)   04/11/19 97.8 °F (36.6 °C) (Oral)   01/14/19 97.9 °F (36.6 °C) (Oral)     BP Readings from Last 3 Encounters:   06/20/19 141/49   04/11/19 128/62   03/15/19 144/64     Pulse Readings from Last 3 Encounters:   06/20/19 (!) 56   04/11/19 75   03/15/19 62     Vitals:    06/20/19 1019   BP: 141/49   Pulse: (!) 56   Resp: 16   Temp: 98 °F (36.7 °C)   TempSrc: Oral   SpO2: 96%   Weight: 126 lb (57.2 kg)   Height: 5' 5.5\" (1.664 m)   PainSc:   0 - No pain         Patient Care Team   Patient Care Team:  Easton Regalado MD as PCP - General (Internal Medicine)  Aye Sims MD as Surgeon (General Surgery)  Marck Hernandez MD (Cardiology)  Terence Cates NP (Nurse Practitioner)

## 2019-06-20 NOTE — PATIENT INSTRUCTIONS
Skin Abscess: Care Instructions  Your Care Instructions    A skin abscess is a bacterial infection that forms a pocket of pus. A boil is a kind of skin abscess. The doctor may have cut an opening in the abscess so that the pus can drain out. You may have gauze in the cut so that the abscess will stay open and keep draining. You may need antibiotics. You will need to follow up with your doctor to make sure the infection has gone away. The doctor has checked you carefully, but problems can develop later. If you notice any problems or new symptoms, get medical treatment right away. Follow-up care is a key part of your treatment and safety. Be sure to make and go to all appointments, and call your doctor if you are having problems. It's also a good idea to know your test results and keep a list of the medicines you take. How can you care for yourself at home? · Apply warm and dry compresses, a heating pad set on low, or a hot water bottle 3 or 4 times a day for pain. Keep a cloth between the heat source and your skin. · If your doctor prescribed antibiotics, take them as directed. Do not stop taking them just because you feel better. You need to take the full course of antibiotics. · Take pain medicines exactly as directed. ? If the doctor gave you a prescription medicine for pain, take it as prescribed. ? If you are not taking a prescription pain medicine, ask your doctor if you can take an over-the-counter medicine. · Keep your bandage clean and dry. Change the bandage whenever it gets wet or dirty, or at least one time a day. · If the abscess was packed with gauze:  ? Keep follow-up appointments to have the gauze changed or removed. If the doctor instructed you to remove the gauze, follow the instructions you were given for how to remove it. ? After the gauze is removed, soak the area in warm water for 15 to 20 minutes 2 times a day, until the wound closes. When should you call for help?   Call your doctor now or seek immediate medical care if:    · You have signs of worsening infection, such as:  ? Increased pain, swelling, warmth, or redness. ? Red streaks leading from the infected skin. ? Pus draining from the wound. ? A fever.    Watch closely for changes in your health, and be sure to contact your doctor if:    · You do not get better as expected. Where can you learn more? Go to http://isa-phan.info/. Enter N841 in the search box to learn more about \"Skin Abscess: Care Instructions. \"  Current as of: April 17, 2018  Content Version: 11.9  © 4467-5457 [employer]. Care instructions adapted under license by [employer] (which disclaims liability or warranty for this information). If you have questions about a medical condition or this instruction, always ask your healthcare professional. [address] any warranty or liability for your use of this information.

## 2019-06-21 ENCOUNTER — OFFICE VISIT (OUTPATIENT)
Dept: INTERNAL MEDICINE CLINIC | Age: 84
End: 2019-06-21

## 2019-06-21 VITALS
HEART RATE: 73 BPM | RESPIRATION RATE: 18 BRPM | DIASTOLIC BLOOD PRESSURE: 52 MMHG | BODY MASS INDEX: 20.34 KG/M2 | OXYGEN SATURATION: 95 % | HEIGHT: 66 IN | SYSTOLIC BLOOD PRESSURE: 122 MMHG | WEIGHT: 126.6 LBS | TEMPERATURE: 98 F

## 2019-06-21 DIAGNOSIS — L02.91 ABSCESS: Primary | ICD-10-CM

## 2019-06-21 NOTE — PROGRESS NOTES
Chief Complaint   Patient presents with    Lesion     pt has lesion on her neck    Hypertension     follow up     Visit Vitals  /52 (BP 1 Location: Left arm, BP Patient Position: Sitting)   Pulse 73   Temp 98 °F (36.7 °C) (Oral)   Resp 18   Ht 5' 5.5\" (1.664 m)   Wt 126 lb 9.6 oz (57.4 kg)   LMP  (LMP Unknown)   SpO2 95%   BMI 20.75 kg/m²     1. Have you been to the ER, urgent care clinic since your last visit? Hospitalized since your last visit? No    2. Have you seen or consulted any other health care providers outside of the [address] [place] [address] since your last visit? Include any pap smears or colon screening.  Dr. Jeferson Winter - 5/2019

## 2019-06-21 NOTE — PROGRESS NOTES
Subjective:     Ms. Christelle Remy comes to the office today with several concerns. Primary is an abscess on her anterior neck, which Ignacio saw a couple days ago and started her on Keflex, without any drainage procedure at that time. Today minimal pressure on the abscess resulted in expression of a copious amount of foul smelling, purulent material.  This is right over her thyroidectomy excision and I wonder if it did not originate as a stitch abscess. I have counseled her in cleaning it daily with a Q-tip and hydrogen peroxide. She is due to see Dr. Omar Naylor on 06/25/19 regardless. She had other concerns recently, including some increased fatigue, for which she came in and got blood work, including CBC, CMP and thyroid. All of this was normal.  She has had some occasional palpitations, which are usually PACs and is still seeing Dr. Sophia Knowles with regard to her heart disease. She has had some swelling in her feet related to Cardizem, but has done well blood pressure-wise and with reduction of her palpitations on that and I would not want to stop that medication. She is actually due for her comprehensive examination in August, but I will not be here, and she asked to see Matt Wilson for that. We will schedule that. Overall I think she is doing very well for her age, however.          Patient Active Problem List    Diagnosis Date Noted    Adult onset hypothyroidism 08/15/2017     Priority: 1 - One    Dyslipidemia 08/15/2017     Priority: 1 - One    Hypertension 08/15/2017     Priority: 1 - One    Laryngopharyngeal reflux (LPR) 04/11/2019     Priority: 2 - Two    Anxiety 08/15/2017     Priority: 2 - Two    Mild cognitive impairment with memory loss 08/15/2017     Priority: 2 - Two    Nasal polyp 04/11/2019     Priority: 3 - Three    Frequent unifocal PVCs 04/11/2019     Priority: 3 - Three    DJD (degenerative joint disease) 08/29/2017     Priority: 3 - Three    Abdominal bloating 08/15/2017     Priority: 3 - Three    ASHD (arteriosclerotic heart disease) 08/15/2017     Priority: 3 - Three    History of TIA (transient ischemic attack) 08/15/2017     Priority: 3 - Three    Near syncope 08/15/2017     Priority: 3 - Three    ARMD (age related macular degeneration) 08/15/2017     Priority: 4 - Four    Frequent UTI 08/15/2017     Priority: 4 - Four    Loss of hair 08/15/2017     Priority: 4 - Four    Urinary incontinence 08/15/2017     Priority: 4 - Four    Villous adenoma 08/15/2017     Priority: 4 - Four    Family history of colon cancer 08/15/2017     Priority: 5 - Five    H/O basal cell carcinoma excision 08/15/2017     Priority: 5 - Five    Herpes zoster without complication 69/28/2124     Priority: 5 - Five    [place] (hard of hearing) 08/15/2017     Priority: 5 - Five    Leg cramps 08/15/2017     Priority: 5 - Five    Medial meniscus tear 08/15/2017     Priority: 5 - Five    Osteoporosis 08/15/2017     Priority: 5 - Five    Vitamin D deficiency 08/15/2017     Priority: 5 - Five    PVC's (premature ventricular contractions) 03/15/2019    Orthostatic hypotension 08/28/2018    PE (physical exam), annual 11/29/2017     Past Surgical History:   Procedure Laterality Date    ABDOMEN SURGERY PROC UNLISTED      colon resection    HX CATARACT REMOVAL Bilateral     HX HEENT      partial thyroidectomy    HX HEENT  02/02/2017    redo neck exploration w/completion thyroidectomy    HX OTHER SURGICAL      skin cancer removed    HX TONSILLECTOMY      HX UROLOGICAL      Contigen injections    [place] COLONOSCOPY FLX DX W/COLLJ SPEC WHEN PFRMD  3/13/2013           Social History     Tobacco Use    Smoking status: Never Smoker    Smokeless tobacco: Never Used   Substance Use Topics    Alcohol use: No     Allergies   Allergen Reactions    Statins-Hmg-Coa Reductase Inhibitors Unknown (comments)     Outpatient Medications Marked as Taking for the 6/21/19 encounter (Office Visit) with Chelsea George MD   Medication Sig Dispense Refill    cephALEXin (KEFLEX) 250 mg capsule Take 2 Caps by mouth three (3) times daily for 7 days. 21 Cap 0    raNITIdine (ZANTAC 75) 75 mg tab Take 75 mg by mouth daily.  dilTIAZem CD (CARDIZEM CD) 180 mg ER capsule Take 1 Cap by mouth daily. Increased 3/11/19 90 Cap 3    levothyroxine (SYNTHROID) 75 mcg tablet Take 75 mcg by mouth six (6) days a week. M-Sat      levothyroxine (SYNTHROID) 50 mcg tablet Take 50 mcg by mouth every seven (7) days. Sunday      VIT C/E/ZN/COPPR/LUTEIN/ZEAXAN (PRESERVISION AREDS 2 PO) Take 1 Tab by mouth two (2) times a day.  carboxymethylcellulose sodium (THERATEARS) 0.25 % drop Apply 1 Drop to eye two (2) times a day.  CHOLECALCIFEROL, VITAMIN D3, (VITAMIN D3 PO) Take 1,000 Units by mouth daily.  cyanocobalamin (VITAMIN B-12) 500 mcg tablet Take 1,000 mcg by mouth daily. Review of Systems:  GEN: no weight loss, weight gain, fatigue or night sweats  CV: no PND, orthopnea, or palpitations  Resp: no dyspnea on exertion, no cough  Abd: no nausea, vomiting or diarrhea  Endocrine: no hair loss, excessive thirst or polyuria  Neurological ROS: no TIA or stroke symptoms      Objective:     Visit Vitals  /52 (BP 1 Location: Left arm, BP Patient Position: Sitting)   Pulse 73   Temp 98 °F (36.7 °C) (Oral)   Resp 18   Ht 5' 5.5\" (1.664 m)   Wt 126 lb 9.6 oz (57.4 kg)   LMP  (LMP Unknown)   SpO2 95%   BMI 20.75 kg/m²     General:   alert, cooperative and no distress   Eyes: conjunctivae/sclerae clear. PERRL   Mouth:  No oral lesions, no pharyngeal erythema, no exudates   Skin: Abscess anterior neck drained and cultured   Heart: S1 and S2 normal,no murmurs noted    Lungs: Clear to auscultation bilaterally, no increased work of breathing   Abdomen: Soft, nontender. Normal bowel sounds   Extremities: No edema or cyanosis                                Assessment/Plan:       ICD-10-CM ICD-9-CM    1.  Abscess L02.91 682.9 CULTURE, BODY FLUID BOB Mathis STAIN    anterior neck       Orders Placed This Encounter    CULTURE, BODY FLUID W GRAM STAIN     Order Specific Question:   Tube Number:     Answer:   1     Finish Keflex    See Dr. Richie Acharya    Follow-up and Dispositions    · Return in about 2 months (around 8/21/2019) for CPE.

## 2019-06-25 ENCOUNTER — OFFICE VISIT (OUTPATIENT)
Dept: SURGERY | Age: 84
End: 2019-06-25

## 2019-06-25 VITALS
HEIGHT: 66 IN | DIASTOLIC BLOOD PRESSURE: 54 MMHG | OXYGEN SATURATION: 98 % | RESPIRATION RATE: 16 BRPM | WEIGHT: 127.6 LBS | SYSTOLIC BLOOD PRESSURE: 131 MMHG | BODY MASS INDEX: 20.51 KG/M2 | TEMPERATURE: 97.4 F | HEART RATE: 66 BPM

## 2019-06-25 DIAGNOSIS — L02.11 ABSCESS OF SKIN OF NECK: Primary | ICD-10-CM

## 2019-06-25 LAB
BACTERIA SPEC AEROBE CULT: ABNORMAL
BACTERIA SPEC AEROBE CULT: ABNORMAL
BACTERIA SPEC ANAEROBE CULT: ABNORMAL

## 2019-06-25 NOTE — PROGRESS NOTES
Chief Complaint   Patient presents with    Skin Problem     Seen at the request of [facility], Eval abscess on neck, LOV 3/2019     1. Have you been to the ER, urgent care clinic since your last visit? Hospitalized since your last visit? No    2. Have you seen or consulted any other health care providers outside of the [address] [place] [address] since your last visit? Include any pap smears or colon screening.  No

## 2019-06-25 NOTE — PROGRESS NOTES
HISTORY OF PRESENT ILLNESS  Tate Tran is a 80 y.o. female who comes in for consultation by Wilfredo Philippe MD for a neck cyst  HPI  She had noted some swelling near her prior thyroidectomy scar for a long time. Recently it got read and came to a head. Wilfredo Philippe MD did an I and D by squeezing it and sent her here for follow up. She states that it feels better. She denies fever, chills or sweats.     Past Medical History:   Diagnosis Date    Abdominal bloating 8/15/2017    Adult onset hypothyroidism 8/15/2017    Anxiety 8/15/2017    ARMD (age related macular degeneration) 8/15/2017    ASHD (arteriosclerotic heart disease) 8/15/2017    Cancer ([facility] [address]     Colon and Skin    Cervical strain 8/15/2017    Dyslipidemia 8/15/2017    Dysphagia 8/15/2017    Family history of colon cancer 8/15/2017    Frequent UTI 8/15/2017    H/O basal cell carcinoma excision 8/15/2017    Herpes zoster without complication 5/21/5592    History of TIA (transient ischemic attack) 8/15/2017    [place] (hard of hearing) 8/15/2017    Hypertension     Leg cramps 8/15/2017    Loss of hair 8/15/2017    Medial meniscus tear 8/15/2017    Mild cognitive impairment with memory loss 8/15/2017    Near syncope 8/15/2017    Osteoarthritis of both knees 8/15/2017    Osteoporosis 8/15/2017    Other ill-defined conditions(799.89)     hypercholesterolemia    Thyroid disease     Urinary incontinence 8/15/2017    Villous adenoma 8/15/2017    Vitamin D deficiency 8/15/2017     Past Surgical History:   Procedure Laterality Date    ABDOMEN SURGERY PROC UNLISTED      colon resection    HX CATARACT REMOVAL Bilateral     HX HEENT      partial thyroidectomy    HX HEENT  02/02/2017    redo neck exploration w/completion thyroidectomy    HX OTHER SURGICAL      skin cancer removed    HX TONSILLECTOMY      HX UROLOGICAL      Contigen injections    [place] COLONOSCOPY FLX DX W/COLLJ SPEC WHEN PFRMD  3/13/2013          Family History Problem Relation Age of Onset    Cancer Mother         stomach    Dementia Mother     Hypertension Mother     Hypertension Father     Cancer Sister         Colon Cancer    Heart Disease Brother     Diabetes Brother     Cancer Brother         Colon Cancer    Heart Disease Son      Social History     Tobacco Use    Smoking status: Never Smoker    Smokeless tobacco: Never Used   Substance Use Topics    Alcohol use: No    Drug use: No     Current Outpatient Medications   Medication Sig    dilTIAZem CD (CARDIZEM CD) 180 mg ER capsule Take 1 Cap by mouth daily. Increased 3/11/19    levothyroxine (SYNTHROID) 75 mcg tablet Take 75 mcg by mouth six (6) days a week. M-Sat    levothyroxine (SYNTHROID) 50 mcg tablet Take 50 mcg by mouth every seven (7) days. Sunday    VIT C/E/ZN/COPPR/LUTEIN/ZEAXAN (PRESERVISION AREDS 2 PO) Take 1 Tab by mouth two (2) times a day.  carboxymethylcellulose sodium (THERATEARS) 0.25 % drop Apply 1 Drop to eye two (2) times a day.  CHOLECALCIFEROL, VITAMIN D3, (VITAMIN D3 PO) Take 1,000 Units by mouth daily.  aspirin delayed-release 81 mg tablet Take 81 mg by mouth daily.  cyanocobalamin (VITAMIN B-12) 500 mcg tablet Take 1,000 mcg by mouth daily.  raNITIdine (ZANTAC 75) 75 mg tab Take 75 mg by mouth daily. No current facility-administered medications for this visit. Allergies   Allergen Reactions    Statins-Hmg-Coa Reductase Inhibitors Unknown (comments)       Review of Systems   Constitutional: Negative for chills, diaphoresis, fever and weight loss. HENT: Positive for hearing loss. Negative for sore throat. Eyes: Positive for blurred vision. Negative for discharge. Respiratory: Negative for cough, shortness of breath and wheezing. Cardiovascular: Positive for palpitations and leg swelling. Negative for chest pain, orthopnea and claudication.    Gastrointestinal: Negative for abdominal pain, constipation, diarrhea, heartburn, melena, nausea and vomiting. Genitourinary: Negative for dysuria, flank pain, frequency and hematuria. Musculoskeletal: Negative for back pain, joint pain, myalgias and neck pain. Skin: Negative for rash. Neurological: Positive for dizziness. Negative for speech change, focal weakness, seizures, loss of consciousness, weakness and headaches. Endo/Heme/Allergies: Bruises/bleeds easily. Psychiatric/Behavioral: Negative for depression and memory loss. The patient is nervous/anxious. Visit Vitals  /54 (BP 1 Location: Left arm, BP Patient Position: Sitting)   Pulse 66   Temp 97.4 °F (36.3 °C) (Oral)   Resp 16   Ht 5' 5.5\" (1.664 m)   Wt 57.9 kg (127 lb 9.6 oz)   LMP  (LMP Unknown)   SpO2 98%   BMI 20.91 kg/m²       Physical Exam   Constitutional: She is oriented to person, place, and time. She appears well-developed and well-nourished. No distress. HENT:   Head: Normocephalic and atraumatic. Mouth/Throat: Oropharynx is clear and moist. No oropharyngeal exudate. Eyes: Pupils are equal, round, and reactive to light. Conjunctivae and EOM are normal. No scleral icterus. Neck: Trachea normal, normal range of motion, full passive range of motion without pain and phonation normal. Neck supple. No JVD present. No tracheal deviation present. No thyroid mass and no thyromegaly present. Cardiovascular: Normal rate and regular rhythm. Exam reveals no gallop and no friction rub. No murmur heard. Pulmonary/Chest: Effort normal and breath sounds normal. No stridor. No respiratory distress. She has no wheezes. She has no rales. Abdominal: Soft. Bowel sounds are normal. She exhibits no distension and no mass. There is no tenderness. There is no rebound and no guarding. Musculoskeletal: Normal range of motion. She exhibits no edema. Lymphadenopathy:     She has no cervical adenopathy. Neurological: She is alert and oriented to person, place, and time. No cranial nerve deficit. Skin: Skin is warm and dry.  No rash noted. She is not diaphoretic. No erythema. No pallor. Psychiatric: She has a normal mood and affect. Her behavior is normal. Judgment and thought content normal.       ASSESSMENT and PLAN  1. Neck abscess. I suspect it was an old suture abscess that drained to the surface. It may be an epidermal cyst but it is directly over the scar so less likely. I think it will heal on its own with local wound care.     RTC 4 weeks if not healed or if it recurs      Severo Sloan, MD FACS

## 2019-06-26 RX ORDER — CIPROFLOXACIN 500 MG/1
500 TABLET ORAL 2 TIMES DAILY
Qty: 10 TAB | Refills: 0 | Status: SHIPPED | OUTPATIENT
Start: 2019-06-26 | End: 2020-07-30 | Stop reason: ALTCHOICE

## 2019-06-26 NOTE — TELEPHONE ENCOUNTER
PHI verified. Patient informed that Dr. Vielka Starks has reviewed lab results and stated Culture abscess not sensitive Keflex.  Give Cipro 500 mg bid x 5 days. Requested Prescriptions     Pending Prescriptions Disp Refills    ciprofloxacin HCl (CIPRO) 500 mg tablet 10 Tab 0     Sig: Take 1 Tab by mouth two (2) times a day.

## 2019-06-26 NOTE — TELEPHONE ENCOUNTER
----- Message from Connie Rosales MD sent at 6/25/2019  5:09 PM EDT -----  Culture abscess not sensitive Keflex.   Give Cipro 500 mg bid x 5 days

## 2019-08-21 RX ORDER — LEVOTHYROXINE SODIUM 75 UG/1
75 TABLET ORAL
Qty: 48 TAB | Refills: 0 | Status: SHIPPED | OUTPATIENT
Start: 2019-08-21 | End: 2020-07-30 | Stop reason: SDUPTHER

## 2019-08-21 NOTE — TELEPHONE ENCOUNTER
This makes it less confusing for her     Requested Prescriptions     Pending Prescriptions Disp Refills    levothyroxine (SYNTHROID) 75 mcg tablet 48 Tab 0     Sig: Take 1 Tab by mouth six (6) days a week.  M-Sat

## 2019-08-21 NOTE — TELEPHONE ENCOUNTER
Patient stated its not enough pills, she only got 30 and wants 48 more on her thyroid medication. Stated she wants to keep all her medications to where she runs out at the same time.     Call Back [phone]

## 2019-09-24 ENCOUNTER — HOSPITAL ENCOUNTER (OUTPATIENT)
Dept: MAMMOGRAPHY | Age: 84
Discharge: HOME | End: 2019-09-24
Attending: FAMILY MEDICINE
Payer: MEDICARE

## 2019-09-24 DIAGNOSIS — Z12.39 SCREENING BREAST EXAMINATION: ICD-10-CM

## 2019-09-24 PROCEDURE — 77067 SCR MAMMO BI INCL CAD: CPT

## 2019-10-24 ENCOUNTER — OFFICE VISIT (OUTPATIENT)
Dept: CARDIOLOGY CLINIC | Age: 84
End: 2019-10-24

## 2019-10-24 VITALS
SYSTOLIC BLOOD PRESSURE: 120 MMHG | OXYGEN SATURATION: 95 % | DIASTOLIC BLOOD PRESSURE: 58 MMHG | RESPIRATION RATE: 16 BRPM | HEART RATE: 71 BPM | WEIGHT: 128.7 LBS | BODY MASS INDEX: 20.68 KG/M2 | HEIGHT: 66 IN

## 2019-10-24 DIAGNOSIS — I49.3 PVC'S (PREMATURE VENTRICULAR CONTRACTIONS): ICD-10-CM

## 2019-10-24 DIAGNOSIS — I25.10 ASHD (ARTERIOSCLEROTIC HEART DISEASE): ICD-10-CM

## 2019-10-24 DIAGNOSIS — I10 ESSENTIAL HYPERTENSION: Primary | ICD-10-CM

## 2019-10-24 DIAGNOSIS — E78.5 DYSLIPIDEMIA: ICD-10-CM

## 2019-10-24 RX ORDER — CLINDAMYCIN HYDROCHLORIDE 150 MG/1
CAPSULE ORAL
Refills: 0 | COMMUNITY
Start: 2019-09-26 | End: 2020-07-30 | Stop reason: ALTCHOICE

## 2019-10-24 RX ORDER — AMLODIPINE BESYLATE 5 MG/1
5 TABLET ORAL DAILY
COMMUNITY
Start: 2018-04-23 | End: 2020-07-30

## 2019-10-24 RX ORDER — DICLOFENAC SODIUM 10 MG/G
GEL TOPICAL
COMMUNITY
Start: 2018-05-18 | End: 2020-07-30

## 2019-10-24 RX ORDER — FAMOTIDINE 20 MG/1
20 TABLET, FILM COATED ORAL
COMMUNITY
Start: 2018-02-19 | End: 2021-08-16

## 2019-10-24 NOTE — PROGRESS NOTES
1. Have you been to the ER, urgent care clinic since your last visit? Hospitalized since your last visit? No    2. Have you seen or consulted any other health care providers outside of the [address] [place] [address] since your last visit? Include any pap smears or colon screening. No     Chief Complaint   Patient presents with    Cholesterol Problem     annual f/u    Hypertension     annual f/u     Pt reports she feels weak after vigorous exercise. MyChart Activation    Thank you for requesting access to [employer] [place]. Please follow the instructions below to securely access and download your online medical record. [employer] [place] allows you to send messages to your doctor, view your test results, renew your prescriptions, schedule appointments, and more. How Do I Sign Up? 1. In your internet browser, go to [email or web address]  2. Click on the First Time User? Click Here link in the Sign In box. You will be redirect to the New Member Sign Up page. 3. Enter your [employer] [place] Access Code exactly as it appears below. You will not need to use this code after youve completed the sign-up process. If you do not sign up before the expiration date, you must request a new code. [employer] [place] Access Code: [unfilled] (This is the date your [employer] [place] access code will )    4. Enter the last four digits of your Social Security Number (xxxx) and Date of Birth (mm/dd/yyyy) as indicated and click Submit. You will be taken to the next sign-up page. 5. Create a [employer] [place] ID. This will be your [employer] [place] login ID and cannot be changed, so think of one that is secure and easy to remember. 6. Create a [employer] [place] password. You can change your password at any time. 7. Enter your Password Reset Question and Answer. This can be used at a later time if you forget your password. 8. Enter your e-mail address. You will receive e-mail notification when new information is available in [employer]. 9. Click Sign Up.  You can now view and download portions of your medical record. 10. Click the Download Summary menu link to download a portable copy of your medical information. Additional Information    If you have questions, please visit the Frequently Asked Questions section of the [employer] website at [email or web address] Remember, [employer] is NOT to be used for urgent needs. For medical emergencies, dial 911.

## 2019-10-24 NOTE — PROGRESS NOTES
Hayley Rosario DNP, ANP-BC  Subjective/HPI:     Davonte Green is a 80 y.o. female is here for routine f/u. Patient is accompanied today by her daughter. Patient reports that after working out in the gym it takes her several days to recover due to lack of energy. Her typical workout routine involves both upper body and leg exercise with several repetitions at low impact weight. She walks half a mile to 1 mile a day when she can. She has a history of PSVT denies fluttering palpitations lightheadedness or dizziness.     PCP Provider  Ann Marie Figueroa MD  Past Medical History:   Diagnosis Date    Abdominal bloating 8/15/2017    Adult onset hypothyroidism 8/15/2017    Anxiety 8/15/2017    ARMD (age related macular degeneration) 8/15/2017    ASHD (arteriosclerotic heart disease) 8/15/2017    Basal cell carcinoma (BCC) 10/2019    Cancer ([facility] [address]     Colon and Skin    Cervical strain 8/15/2017    Dyslipidemia 8/15/2017    Dysphagia 8/15/2017    Family history of colon cancer 8/15/2017    Frequent UTI 8/15/2017    H/O basal cell carcinoma excision 8/15/2017    Herpes zoster without complication 6/29/5532    History of TIA (transient ischemic attack) 8/15/2017    [place] (hard of hearing) 8/15/2017    Hypertension     Leg cramps 8/15/2017    Loss of hair 8/15/2017    Medial meniscus tear 8/15/2017    Mild cognitive impairment with memory loss 8/15/2017    Near syncope 8/15/2017    Osteoarthritis of both knees 8/15/2017    Osteoporosis 8/15/2017    Other ill-defined conditions(799.89)     hypercholesterolemia    Thyroid disease     Urinary incontinence 8/15/2017    Villous adenoma 8/15/2017    Vitamin D deficiency 8/15/2017      Past Surgical History:   Procedure Laterality Date    ABDOMEN SURGERY PROC UNLISTED      colon resection    HX CATARACT REMOVAL Bilateral     HX HEENT      partial thyroidectomy    HX HEENT  02/02/2017    redo neck exploration w/completion thyroidectomy    HX OTHER SURGICAL      skin cancer removed    HX TONSILLECTOMY      HX UROLOGICAL      Contigen injections    UT COLONOSCOPY FLX DX W/COLLJ SPEC WHEN PFRMD  3/13/2013          Allergies   Allergen Reactions    Statins-Hmg-Coa Reductase Inhibitors Unknown (comments)      Family History   Problem Relation Age of Onset    Cancer Mother         stomach    Dementia Mother     Hypertension Mother     Hypertension Father     Cancer Sister         Colon Cancer    Heart Disease Brother     Diabetes Brother     Cancer Brother         Colon Cancer    Heart Disease Son       Current Outpatient Medications   Medication Sig    amLODIPine (NORVASC) 5 mg tablet Take 5 mg by mouth daily.  levothyroxine (SYNTHROID) 75 mcg tablet TAKE 1 TABLET BY MOUTH DAILY ON MONDAY-SATURDAY (BEFORE BREAKFAST)    dilTIAZem CD (CARDIZEM CD) 180 mg ER capsule Take 1 Cap by mouth daily. Increased 3/11/19    levothyroxine (SYNTHROID) 50 mcg tablet Take 50 mcg by mouth every seven (7) days. Sunday    VIT C/E/ZN/COPPR/LUTEIN/ZEAXAN (PRESERVISION AREDS 2 PO) Take 1 Tab by mouth two (2) times a day.  carboxymethylcellulose sodium (THERATEARS) 0.25 % drop Apply 1 Drop to eye two (2) times a day.  CHOLECALCIFEROL, VITAMIN D3, (VITAMIN D3 PO) Take 1,000 Units by mouth daily.  aspirin delayed-release 81 mg tablet Take 81 mg by mouth daily.  cyanocobalamin (VITAMIN B-12) 500 mcg tablet Take 1,000 mcg by mouth daily.  clindamycin (CLEOCIN) 150 mg capsule TAKE 1 CAPSULE BY MOUTH THREE TIMES DAILY UNTIL GONE    diclofenac (VOLTAREN) 1 % gel Apply two grams by topical route four times daily to the affected area (s).  famotidine (PEPCID) 20 mg tablet     levothyroxine (SYNTHROID) 75 mcg tablet Take 1 Tab by mouth six (6) days a week. M-Sat    ciprofloxacin HCl (CIPRO) 500 mg tablet Take 1 Tab by mouth two (2) times a day.  raNITIdine (ZANTAC 75) 75 mg tab Take 75 mg by mouth daily.      No current facility-administered medications for this visit. Vitals:    10/24/19 1319 10/24/19 1337   BP: 130/60 120/58   Pulse: 71    Resp: 16    SpO2: 95%    Weight: 128 lb 11.2 oz (58.4 kg)    Height: 5' 5.5\" (1.664 m)      Social History     Socioeconomic History    Marital status:      Spouse name: Not on file    Number of children: Not on file    Years of education: Not on file    Highest education level: Not on file   Occupational History    Not on file   Social Needs    Financial resource strain: Not on file    Food insecurity:     Worry: Not on file     Inability: Not on file    Transportation needs:     Medical: Not on file     Non-medical: Not on file   Tobacco Use    Smoking status: Never Smoker    Smokeless tobacco: Never Used   Substance and Sexual Activity    Alcohol use: No    Drug use: No    Sexual activity: Not on file   Lifestyle    Physical activity:     Days per week: Not on file     Minutes per session: Not on file    Stress: Not on file   Relationships    Social connections:     Talks on phone: Not on file     Gets together: Not on file     Attends [employer] service: Not on file     Active member of club or organization: Not on file     Attends meetings of clubs or organizations: Not on file     Relationship status: Not on file    Intimate partner violence:     Fear of current or ex partner: Not on file     Emotionally abused: Not on file     Physically abused: Not on file     Forced sexual activity: Not on file   Other Topics Concern    Not on file   Social History Narrative    Not on file       I have reviewed the nurses notes, vitals, problem list, allergy list, medical history, family, social history and medications. Review of Symptoms:    General: Pt denies excessive weight gain or loss. Pt is able to conduct ADL's, reporting fatigue after working out. HEENT: Denies blurred vision, headaches, epistaxis and difficulty swallowing.   Respiratory: Denies shortness of breath, DODGE, wheezing or stridor. Cardiovascular: Denies precordial pain, palpitations, edema or PND  Gastrointestinal: Denies poor appetite, indigestion, abdominal pain or blood in stool  Musculoskeletal: Denies pain or swelling from muscles or joints  Neurologic: Denies tremor, paresthesias, or sensory motor disturbance  Skin: Denies rash, itching or texture change. Physical Exam:      General: Well developed, in no acute distress, cooperative and alert  HEENT: No carotid bruits, no JVD, trach is midline. Neck Supple,   Heart:  Normal S1/S2 negative S3 or S4. Regular, no murmur, gallop or rub. Respiratory: Clear bilaterally x 4, no wheezing or rales  Abdomen:   Soft, non-tender, no masses, bowel sounds are active. Extremities:  No edema, normal cap refill, no cyanosis, atraumatic. Neuro: A&Ox3, speech clear, gait stable. Skin: Skin color is normal. No rashes or lesions.  Non diaphoretic  Vascular: 2+ pulses symmetric in all extremities    Cardiographics    ECG: Sinus rhythm  Results for orders placed or performed during the hospital encounter of 05/30/17   EKG, 12 LEAD, INITIAL   Result Value Ref Range    Ventricular Rate 68 BPM    Atrial Rate 68 BPM    P-R Interval 180 ms    QRS Duration 84 ms    Q-T Interval 388 ms    QTC Calculation (Bezet) 412 ms    Calculated P Axis 57 degrees    Calculated R Axis 57 degrees    Calculated T Axis 63 degrees    Diagnosis       Normal sinus rhythm  Low voltage QRS  When compared with ECG of 26-JAN-2017 08:20,  No significant change was found  Confirmed by George Kathleen M.D., Carmen Tarango ([id number]) on 5/31/2017 3:42:01 PM           Cardiology Labs:  No results found for: CHOL, CHOLX, CHLST, CHOLV, 157083, HDL, HDLP, LDL, LDLC, DLDLP, TGLX, TRIGL, TRIGP, [facility], [facility]    Lab Results   Component Value Date/Time    Sodium 139 06/14/2019 02:00 PM    Potassium 4.4 06/14/2019 02:00 PM    Chloride 101 06/14/2019 02:00 PM    CO2 31.0 06/14/2019 02:00 PM    Anion gap 7 06/14/2019 02:00 PM    Glucose 90 06/14/2019 02:00 PM    BUN 13.0 06/14/2019 02:00 PM    Creatinine 0.5 (L) 06/14/2019 02:00 PM    BUN/Creatinine ratio 26 06/14/2019 02:00 PM    GFR est AA >60 06/14/2019 02:00 PM    GFR est non-AA >60 06/14/2019 02:00 PM    Calcium 9.4 06/14/2019 02:00 PM    Bilirubin, total 0.5 06/14/2019 02:00 PM    AST (SGOT) 23.0 06/14/2019 02:00 PM    Alk. phosphatase 85 06/14/2019 02:00 PM    Protein, total 6.7 06/14/2019 02:00 PM    Albumin 4.2 06/14/2019 02:00 PM    Globulin 2.50 06/14/2019 02:00 PM    A-G Ratio 1.7 06/14/2019 02:00 PM    ALT (SGPT) 18 06/14/2019 02:00 PM           Assessment:     Assessment:     Diagnoses and all orders for this visit:    1. ASHD (arteriosclerotic heart disease)    2. Dyslipidemia  -     AMB POC EKG ROUTINE W/ 12 LEADS, INTER & REP    3. Essential hypertension    4. PVC's (premature ventricular contractions)        ICD-10-CM ICD-9-CM    1. ASHD (arteriosclerotic heart disease) I25.10 414.00    2. Dyslipidemia E78.5 272.4 AMB POC EKG ROUTINE W/ 12 LEADS, INTER & REP   3. Essential hypertension I10 401.9    4. PVC's (premature ventricular contractions) I49.3 427.69      Orders Placed This Encounter    AMB POC EKG ROUTINE W/ 12 LEADS, INTER & REP     Order Specific Question:   Reason for Exam:     Answer:   Routine    clindamycin (CLEOCIN) 150 mg capsule     Sig: TAKE 1 CAPSULE BY MOUTH THREE TIMES DAILY UNTIL GONE     Refill:  0    diclofenac (VOLTAREN) 1 % gel     Sig: Apply two grams by topical route four times daily to the affected area (s).  amLODIPine (NORVASC) 5 mg tablet     Sig: Take 5 mg by mouth daily.  famotidine (PEPCID) 20 mg tablet        Plan:     1. History of PSVT: Palpitations symptoms controlled with previous up titration of diltiazem continue current medications  2. Hypertension: 128/58 continue amlodipine 5 mg  3. Fatigue post workout: Echocardiogram normal 2018, fairly active at 80 years young going to the gym to lift weights, walking half to-1 mile a day.   Recommend pre-post hydration dietary intake of foods rich in potassium. Previous nuclear stress test negative for ischemia. We will have her follow-up in 6 months if no improvement and if the patient prefers can perform ischemic work-up. Maddy Jones NP      Please note that this dictation was completed with [employer], the computer voice recognition software. Quite often unanticipated grammatical, syntax, homophones, and other interpretive errors are inadvertently transcribed by the computer software. Please disregard these errors. Please excuse any errors that have escaped final proofreading. Thank you. Patient seen and examined by me with nurse practitioner. I personally performed all components of the history, physical, and medical decision making and agree with the assessment and plan as noted.     Tiny Bermeo MD

## 2020-02-23 RX ORDER — DILTIAZEM HYDROCHLORIDE 180 MG/1
CAPSULE, EXTENDED RELEASE ORAL
Qty: 90 CAP | Refills: 0 | Status: SHIPPED | OUTPATIENT
Start: 2020-02-23 | End: 2020-05-23

## 2020-05-23 RX ORDER — DILTIAZEM HYDROCHLORIDE 180 MG/1
CAPSULE, EXTENDED RELEASE ORAL
Qty: 90 CAP | Refills: 0 | Status: SHIPPED | OUTPATIENT
Start: 2020-05-23 | End: 2020-08-24

## 2020-07-30 ENCOUNTER — OFFICE VISIT (OUTPATIENT)
Dept: CARDIOLOGY CLINIC | Age: 85
End: 2020-07-30

## 2020-07-30 VITALS
RESPIRATION RATE: 18 BRPM | HEART RATE: 73 BPM | BODY MASS INDEX: 20.53 KG/M2 | SYSTOLIC BLOOD PRESSURE: 126 MMHG | OXYGEN SATURATION: 98 % | WEIGHT: 123.2 LBS | DIASTOLIC BLOOD PRESSURE: 64 MMHG | HEIGHT: 65 IN

## 2020-07-30 DIAGNOSIS — I47.1 PSVT (PAROXYSMAL SUPRAVENTRICULAR TACHYCARDIA) (HCC): ICD-10-CM

## 2020-07-30 DIAGNOSIS — I25.10 ASHD (ARTERIOSCLEROTIC HEART DISEASE): Primary | ICD-10-CM

## 2020-07-30 DIAGNOSIS — I49.3 PVC'S (PREMATURE VENTRICULAR CONTRACTIONS): ICD-10-CM

## 2020-07-30 DIAGNOSIS — I10 ESSENTIAL HYPERTENSION: ICD-10-CM

## 2020-07-30 RX ORDER — FLUTICASONE PROPIONATE 50 MCG
SPRAY, SUSPENSION (ML) NASAL
COMMUNITY
Start: 2020-06-23 | End: 2021-08-16

## 2020-07-30 RX ORDER — MONTELUKAST SODIUM 10 MG/1
TABLET ORAL
COMMUNITY
Start: 2020-07-20 | End: 2021-02-01 | Stop reason: ALTCHOICE

## 2020-07-30 NOTE — PROGRESS NOTES
1. Have you been to the ER, urgent care clinic since your last visit? Hospitalized since your last visit? No.    2. Have you seen or consulted any other health care providers outside of the [address] [place] [address] since your last visit? Include any pap smears or colon screening.    No.      Chief Complaint   Patient presents with    Follow-up     6 month- 3 episodes of waking at night not able to breathe

## 2020-07-30 NOTE — PROGRESS NOTES
[address], [address] [facility]  [phone]     Subjective:      Driss Krishnamurthy is a 80 y.o. female is here for routine f/u. She has a PMHx of PVCs, HTN and HLD. Last seen by us in 10/19. She continues to stay active and walks around her neighborhood. Prior to Covid, she has been going to the gym. She reports 3 episodes of mild sob in the middle of the night this week, awoken her from her sleep. Continues to sleep with one pillow. Has been having issues with allergy, Recently started on flonase/singulair by pcp. She states feeling well rested after a night's sleep, denies any further fatigue. The patient denies chest pain,  orthopnea, PND, LE edema, palpitations, syncope, or presyncope.        Patient Active Problem List    Diagnosis Date Noted    Laryngopharyngeal reflux (LPR) 04/11/2019     Priority: 2 - Two    Nasal polyp 04/11/2019     Priority: 3 - Three    Frequent unifocal PVCs 04/11/2019     Priority: 3 - Three    DJD (degenerative joint disease) 08/29/2017     Priority: 3 - Three    Abscess of skin of neck 06/25/2019    PVC's (premature ventricular contractions) 03/15/2019    Orthostatic hypotension 08/28/2018    PE (physical exam), annual 11/29/2017    Abdominal bloating 08/15/2017    Adult onset hypothyroidism 08/15/2017    Anxiety 08/15/2017    ARMD (age related macular degeneration) 08/15/2017    ASHD (arteriosclerotic heart disease) 08/15/2017    Dyslipidemia 08/15/2017    Family history of colon cancer 08/15/2017    Frequent UTI 08/15/2017    H/O basal cell carcinoma excision 08/15/2017    Herpes zoster without complication 19/75/0622    History of TIA (transient ischemic attack) 08/15/2017    [place] (hard of hearing) 08/15/2017    Hypertension 08/15/2017    Leg cramps 08/15/2017    Loss of hair 08/15/2017    Medial meniscus tear 08/15/2017    Mild cognitive impairment with memory loss 08/15/2017    Near syncope 08/15/2017    Osteoporosis 08/15/2017    Urinary incontinence 08/15/2017    Villous adenoma 08/15/2017    Vitamin D deficiency 08/15/2017      Opal Arguelles MD  Past Medical History:   Diagnosis Date    Abdominal bloating 8/15/2017    Adult onset hypothyroidism 8/15/2017    Anxiety 8/15/2017    ARMD (age related macular degeneration) 8/15/2017    ASHD (arteriosclerotic heart disease) 8/15/2017    Basal cell carcinoma (BCC) 10/2019    Cancer ([address]     Colon and Skin    Cervical strain 8/15/2017    Dyslipidemia 8/15/2017    Dysphagia 8/15/2017    Family history of colon cancer 8/15/2017    Frequent UTI 8/15/2017    H/O basal cell carcinoma excision 8/15/2017    Herpes zoster without complication 2/23/4390    History of TIA (transient ischemic attack) 8/15/2017    [place] (hard of hearing) 8/15/2017    Hypertension     Leg cramps 8/15/2017    Loss of hair 8/15/2017    Medial meniscus tear 8/15/2017    Mild cognitive impairment with memory loss 8/15/2017    Near syncope 8/15/2017    Osteoarthritis of both knees 8/15/2017    Osteoporosis 8/15/2017    Other ill-defined conditions(799.89)     hypercholesterolemia    Thyroid disease     Urinary incontinence 8/15/2017    Villous adenoma 8/15/2017    Vitamin D deficiency 8/15/2017      Past Surgical History:   Procedure Laterality Date    ABDOMEN SURGERY PROC UNLISTED      colon resection    HX CATARACT REMOVAL Bilateral     HX HEENT      partial thyroidectomy    HX HEENT  02/02/2017    redo neck exploration w/completion thyroidectomy    HX OTHER SURGICAL      skin cancer removed    HX TONSILLECTOMY      HX UROLOGICAL      Contigen injections    [place] COLONOSCOPY FLX DX W/COLLJ SPEC WHEN PFRMD  3/13/2013          Allergies   Allergen Reactions    Statins-Hmg-Coa Reductase Inhibitors Unknown (comments)      Family History   Problem Relation Age of Onset    Cancer Mother         stomach    Dementia Mother     Hypertension Mother     Hypertension Father     Cancer Sister         Colon Cancer    Heart Disease Brother     Diabetes Brother     Cancer Brother         Colon Cancer    Heart Disease Son       Social History     Socioeconomic History    Marital status:      Spouse name: Not on file    Number of children: Not on file    Years of education: Not on file    Highest education level: Not on file   Occupational History    Not on file   Social Needs    Financial resource strain: Not on file    Food insecurity     Worry: Not on file     Inability: Not on file   [employer] needs     Medical: Not on file     Non-medical: Not on file   Tobacco Use    Smoking status: Never Smoker    Smokeless tobacco: Never Used   Substance and Sexual Activity    Alcohol use: No    Drug use: No    Sexual activity: Not on file   Lifestyle    Physical activity     Days per week: Not on file     Minutes per session: Not on file    Stress: Not on file   Relationships    Social connections     Talks on phone: Not on file     Gets together: Not on file     Attends Confucianism service: Not on file     Active member of club or organization: Not on file     Attends meetings of clubs or organizations: Not on file     Relationship status: Not on file    Intimate partner violence     Fear of current or ex partner: Not on file     Emotionally abused: Not on file     Physically abused: Not on file     Forced sexual activity: Not on file   Other Topics Concern    Not on file   Social History Narrative    Not on file      Current Outpatient Medications   Medication Sig    fluticasone propionate (FLONASE) 50 mcg/actuation nasal spray USE 1 SPRAY(S) IN EACH NOSTRIL ONCE DAILY FOR 30 DAYS    montelukast (SINGULAIR) 10 mg tablet TAKE 1 TABLET BY MOUTH ONCE DAILY    Cartia  mg capsule Take 1 capsule by mouth once daily    famotidine (PEPCID) 20 mg tablet Take 20 mg by mouth nightly.     levothyroxine (SYNTHROID) 75 mcg tablet TAKE 1 TABLET BY MOUTH DAILY ON MONDAY-SATURDAY (BEFORE BREAKFAST)    levothyroxine (SYNTHROID) 50 mcg tablet Take 50 mcg by mouth every seven (7) days. Sunday    VIT C/E/ZN/COPPR/LUTEIN/ZEAXAN (PRESERVISION AREDS 2 PO) Take 1 Tab by mouth two (2) times a day.  carboxymethylcellulose sodium (THERATEARS) 0.25 % drop Apply 1 Drop to eye two (2) times a day.  CHOLECALCIFEROL, VITAMIN D3, (VITAMIN D3 PO) Take 1,000 Units by mouth daily.  aspirin delayed-release 81 mg tablet Take 81 mg by mouth daily.  cyanocobalamin (VITAMIN B-12) 500 mcg tablet Take 1,000 mcg by mouth daily. No current facility-administered medications for this visit. Review of Symptoms:  11 systems reviewed, negative other than as stated in the HPI    Physical ExamPhysical Exam:    Vitals:    07/30/20 1530   BP: 126/64   Pulse: 73   Resp: 18   SpO2: 98%   Weight: 123 lb 3.2 oz (55.9 kg)   Height: 5' 5\" (1.651 m)     Body mass index is 20.5 kg/m². General PE  Gen:  NAD  Mental Status - Alert. General Appearance - Not in acute distress. HEENT:  PERRL, no carotid bruits or JVD  Chest and Lung Exam   Inspection: Accessory muscles - No use of accessory muscles in breathing. Auscultation:   Breath sounds: - Normal.   Cardiovascular   Inspection: Jugular vein - Bilateral - Inspection Normal.   Palpation/Percussion:   Apical Impulse: - Normal.   Auscultation: Rhythm - Regular. Heart Sounds - S1 WNL and S2 WNL. No S3 or S4. Murmurs & Other Heart Sounds: Auscultation of the heart reveals - No Murmurs. Peripheral Vascular   Upper Extremity: Inspection - Bilateral - No Cyanotic nailbeds or Digital clubbing. Lower Extremity:   Palpation: Edema - Bilateral - No edema. Abdomen:   Soft, non-tender, bowel sounds are active.   Neuro: A&O times 3, CN and motor grossly WNL    Labs:   No results found for: CHOL, CHOLX, CHLST, CHOLV, 414728, HDL, HDLP, LDL, LDLC, DLDLP, TGLX, TRIGL, TRIGP, CHHD, [facility]  Lab Results   Component Value Date/Time    CK 60 01/17/2014 12:15 AM     Lab Results   Component Value Date/Time    Sodium 139 06/14/2019 02:00 PM    Potassium 4.4 06/14/2019 02:00 PM    Chloride 101 06/14/2019 02:00 PM    CO2 31.0 06/14/2019 02:00 PM    Anion gap 7 06/14/2019 02:00 PM    Glucose 90 06/14/2019 02:00 PM    BUN 13.0 06/14/2019 02:00 PM    Creatinine 0.5 (L) 06/14/2019 02:00 PM    BUN/Creatinine ratio 26 06/14/2019 02:00 PM    GFR est AA >60 06/14/2019 02:00 PM    GFR est non-AA >60 06/14/2019 02:00 PM    Calcium 9.4 06/14/2019 02:00 PM    Bilirubin, total 0.5 06/14/2019 02:00 PM    Alk. phosphatase 85 06/14/2019 02:00 PM    Protein, total 6.7 06/14/2019 02:00 PM    Albumin 4.2 06/14/2019 02:00 PM    Globulin 2.50 06/14/2019 02:00 PM    A-G Ratio 1.7 06/14/2019 02:00 PM    ALT (SGPT) 18 06/14/2019 02:00 PM       EKG:     Assessment:     Assessment:      1. ASHD (arteriosclerotic heart disease)    2. PSVT (paroxysmal supraventricular tachycardia) ([facility]    3. Essential hypertension    4. PVC's (premature ventricular contractions)        Orders Placed This Encounter    AMB POC EKG ROUTINE W/ 12 LEADS, INTER & REP     Order Specific Question:   Reason for Exam:     Answer:   routine    fluticasone propionate (FLONASE) 50 mcg/actuation nasal spray     Sig: USE 1 SPRAY(S) IN EACH NOSTRIL ONCE DAILY FOR 30 DAYS    montelukast (SINGULAIR) 10 mg tablet     Sig: TAKE 1 TABLET BY MOUTH ONCE DAILY        Plan:     Patient presents for follow up, last seen by us in 10/19. She continues to stay active and walks around her neighborhood. Prior to Covid, she has been going to the gym. She reports 3 episodes of mild sob in the middle of the night this week, awoken her from her sleep. Continues to sleep with one pillow. Has been having issues with allergy, Recently started on flonase/singulair by pcp. She states feeling well rested after a night's sleep, denies any further fatigue. 1.  History of PSVT: No further palpitations with Diltiazem  2.   Hypertension: Controlled with Diltiazem  3. Hx fatigue, resolved:       Echocardiogram normal 2018, fairly active at 80 yr old, exercising at a local gym / walking 0.5-1 mile daily. Previous nuclear stress test negative for ischemia in 2014    Continue current care and f/u in 6 months. Ernestine Puckett NP       Patient seen and examined by me with nurse practitioner. I personally performed all components of the history, physical, and medical decision making and agree with the assessment and plan as noted. Advised her to call back if she has any recurrence of symptoms.      Eve White MD

## 2020-08-24 RX ORDER — DILTIAZEM HYDROCHLORIDE 180 MG/1
CAPSULE, EXTENDED RELEASE ORAL
Qty: 90 CAP | Refills: 0 | Status: SHIPPED | OUTPATIENT
Start: 2020-08-24 | End: 2020-11-23

## 2020-10-14 ENCOUNTER — HOSPITAL ENCOUNTER (OUTPATIENT)
Dept: MAMMOGRAPHY | Age: 85
Discharge: HOME | End: 2020-10-14
Attending: NURSE PRACTITIONER
Payer: MEDICARE

## 2020-10-14 DIAGNOSIS — Z12.31 VISIT FOR SCREENING MAMMOGRAM: ICD-10-CM

## 2020-10-14 PROCEDURE — 77067 SCR MAMMO BI INCL CAD: CPT

## 2020-11-23 RX ORDER — DILTIAZEM HYDROCHLORIDE 180 MG/1
CAPSULE, EXTENDED RELEASE ORAL
Qty: 90 CAP | Refills: 0 | Status: SHIPPED | OUTPATIENT
Start: 2020-11-23 | End: 2021-02-19

## 2021-01-07 ENCOUNTER — TELEPHONE (OUTPATIENT)
Dept: CARDIOLOGY CLINIC | Age: 86
End: 2021-01-07

## 2021-01-07 NOTE — TELEPHONE ENCOUNTER
Returned call,verified pt with two pt identifiers, pt advised for the past 2 days she has been having chills then hot. No fever or aches. She has nausea today. No signs of UTI or any other infections. She has not been around anyone positive for COVID or who has been sick. No headaches, dizziness, chest pain, sob, heart palps. She was due to have biopsy on eye today but did cancel it because she did not feel well. She did stop ASA in preparation for the procedure but will start it back tonight. She has been taking BP every morning at 8 after meds and for the past 2 days it has been elevated. It was yesterday 186/85 and today 173/78. She did not retake it. No changes in meds. She has an appt on 2/1/21 with . she notes feeling better today then she has been. She is worried about BP. Advised I would send to  and call her by tomorrow. Pt verbalized understanding.

## 2021-01-08 RX ORDER — HYDROCHLOROTHIAZIDE 12.5 MG/1
12.5 TABLET ORAL DAILY
Qty: 30 TAB | Refills: 5 | Status: SHIPPED | OUTPATIENT
Start: 2021-01-08 | End: 2021-02-01

## 2021-01-08 RX ORDER — HYDROCHLOROTHIAZIDE 12.5 MG/1
12.5 TABLET ORAL DAILY
COMMUNITY
End: 2021-01-08 | Stop reason: SDUPTHER

## 2021-01-08 NOTE — TELEPHONE ENCOUNTER
Message  Received: Yesterday  Message Contents   MD Adan Monet LPN   Caller: Unspecified (Yesterday,  8:14 AM)             She can try hctz 12.5 mg daily and continue to monitor BP. Called pt,verified pt with two pt identifiers, asked pt what her BP was this am. 152/74 at 8 am. Advised still elevated. Advised  would like to add medication hctz 12.5 mg daily. She is in agreement to start medication. Verified pharmacy and spelled out medication for pt to write down. Advised of 24 hours for pharmacy to receive and call them if she does not hear from them. Advised to monitor her BP after starting medication and bring in her BP readings at her visit. Pt verbalized understanding. Sent hctz to  for approval and to send to pharmacy.

## 2021-02-01 ENCOUNTER — OFFICE VISIT (OUTPATIENT)
Dept: CARDIOLOGY CLINIC | Age: 86
End: 2021-02-01
Payer: MEDICARE

## 2021-02-01 VITALS
BODY MASS INDEX: 20.71 KG/M2 | DIASTOLIC BLOOD PRESSURE: 72 MMHG | HEIGHT: 65 IN | SYSTOLIC BLOOD PRESSURE: 144 MMHG | WEIGHT: 124.3 LBS | RESPIRATION RATE: 18 BRPM | HEART RATE: 66 BPM | OXYGEN SATURATION: 98 %

## 2021-02-01 DIAGNOSIS — I25.10 ASHD (ARTERIOSCLEROTIC HEART DISEASE): ICD-10-CM

## 2021-02-01 DIAGNOSIS — I10 ESSENTIAL HYPERTENSION: ICD-10-CM

## 2021-02-01 DIAGNOSIS — I47.1 PSVT (PAROXYSMAL SUPRAVENTRICULAR TACHYCARDIA) (HCC): Primary | ICD-10-CM

## 2021-02-01 DIAGNOSIS — E78.5 DYSLIPIDEMIA: ICD-10-CM

## 2021-02-01 PROCEDURE — 1090F PRES/ABSN URINE INCON ASSESS: CPT | Performed by: INTERNAL MEDICINE

## 2021-02-01 PROCEDURE — 93010 ELECTROCARDIOGRAM REPORT: CPT | Performed by: INTERNAL MEDICINE

## 2021-02-01 PROCEDURE — G8420 CALC BMI NORM PARAMETERS: HCPCS | Performed by: INTERNAL MEDICINE

## 2021-02-01 PROCEDURE — G8536 NO DOC ELDER MAL SCRN: HCPCS | Performed by: INTERNAL MEDICINE

## 2021-02-01 PROCEDURE — 93005 ELECTROCARDIOGRAM TRACING: CPT | Performed by: INTERNAL MEDICINE

## 2021-02-01 PROCEDURE — 1101F PT FALLS ASSESS-DOCD LE1/YR: CPT | Performed by: INTERNAL MEDICINE

## 2021-02-01 PROCEDURE — G0463 HOSPITAL OUTPT CLINIC VISIT: HCPCS | Performed by: INTERNAL MEDICINE

## 2021-02-01 PROCEDURE — G8427 DOCREV CUR MEDS BY ELIG CLIN: HCPCS | Performed by: INTERNAL MEDICINE

## 2021-02-01 PROCEDURE — 99214 OFFICE O/P EST MOD 30 MIN: CPT | Performed by: INTERNAL MEDICINE

## 2021-02-01 PROCEDURE — G8510 SCR DEP NEG, NO PLAN REQD: HCPCS | Performed by: INTERNAL MEDICINE

## 2021-02-01 RX ORDER — CYANOCOBALAMIN (VITAMIN B-12) 2500 MCG
5000 TABLET, SUBLINGUAL SUBLINGUAL DAILY
COMMUNITY

## 2021-02-01 NOTE — PROGRESS NOTES
1. Have you been to the ER, urgent care clinic since your last visit? Hospitalized since your last visit? No    2. Have you seen or consulted any other health care providers outside of the [address] [place] [address] since your last visit? Include any pap smears or colon screening.  No       Chief Complaint   Patient presents with    Coronary Artery Disease     C/O Leg swelling

## 2021-02-01 NOTE — PROGRESS NOTES
2 [address], [address] [facility]  [phone]     Subjective:      Fransisca Braun is a 80 y.o. female is here for routine f/u. Last seen by us in 7/2020. She continues to stay active and walks around her neighborhood. Other times she walks indoors. She does about a mile daily. She called here beginning of the year for elevated BP, we started low dose Hctz. Pt took meds 1-2 days and  developed headache, leg cramps. Per daughter, went to PCP, and was found to have low sodium. Bp today 140s/70s. States home BP running about same.     The patient denies chest pain/ shortness of breath, orthopnea, PND, LE edema, palpitations, syncope, or presyncope.        Patient Active Problem List    Diagnosis Date Noted    Laryngopharyngeal reflux (LPR) 04/11/2019     Priority: 2 - Two    Nasal polyp 04/11/2019     Priority: 3 - Three    Frequent unifocal PVCs 04/11/2019     Priority: 3 - Three    DJD (degenerative joint disease) 08/29/2017     Priority: 3 - Three    PSVT (paroxysmal supraventricular tachycardia) ([facility] [address] 02/01/2021    Abscess of skin of neck 06/25/2019    PVC's (premature ventricular contractions) 03/15/2019    Orthostatic hypotension 08/28/2018    PE (physical exam), annual 11/29/2017    Abdominal bloating 08/15/2017    Adult onset hypothyroidism 08/15/2017    Anxiety 08/15/2017    ARMD (age related macular degeneration) 08/15/2017    ASHD (arteriosclerotic heart disease) 08/15/2017    Dyslipidemia 08/15/2017    Family history of colon cancer 08/15/2017    Frequent UTI 08/15/2017    H/O basal cell carcinoma excision 08/15/2017    Herpes zoster without complication 98/58/2669    History of TIA (transient ischemic attack) 08/15/2017    [place] (hard of hearing) 08/15/2017    Hypertension 08/15/2017    Leg cramps 08/15/2017    Loss of hair 08/15/2017    Medial meniscus tear 08/15/2017    Mild cognitive impairment with memory loss 08/15/2017    Near syncope 08/15/2017    Osteoporosis 08/15/2017    Urinary incontinence 08/15/2017    Villous adenoma 08/15/2017    Vitamin D deficiency 08/15/2017      Mike Ahmadi MD  Past Medical History:   Diagnosis Date    Abdominal bloating 8/15/2017    Adult onset hypothyroidism 8/15/2017    Anxiety 8/15/2017    ARMD (age related macular degeneration) 8/15/2017    ASHD (arteriosclerotic heart disease) 8/15/2017    Basal cell carcinoma (BCC) 10/2019    Cancer ([facility] [address]     Colon and Skin    Cervical strain 8/15/2017    Dyslipidemia 8/15/2017    Dysphagia 8/15/2017    Family history of colon cancer 8/15/2017    Frequent UTI 8/15/2017    H/O basal cell carcinoma excision 8/15/2017    Herpes zoster without complication 7/43/1243    History of TIA (transient ischemic attack) 8/15/2017    [place] (hard of hearing) 8/15/2017    Hypertension     Leg cramps 8/15/2017    Loss of hair 8/15/2017    Medial meniscus tear 8/15/2017    Menopause     Mild cognitive impairment with memory loss 8/15/2017    Near syncope 8/15/2017    Osteoarthritis of both knees 8/15/2017    Osteoporosis 8/15/2017    Other ill-defined conditions(799.89)     hypercholesterolemia    Thyroid disease     Urinary incontinence 8/15/2017    Villous adenoma 8/15/2017    Vitamin D deficiency 8/15/2017      Past Surgical History:   Procedure Laterality Date    HX CATARACT REMOVAL Bilateral     HX HEENT      partial thyroidectomy    HX HEENT  02/02/2017    redo neck exploration w/completion thyroidectomy    HX OTHER SURGICAL      skin cancer removed    HX TONSILLECTOMY      HX UROLOGICAL      Contigen injections    [place] ABDOMEN SURGERY PROC UNLISTED      colon resection    [place] COLONOSCOPY FLX DX W/COLLJ SPEC WHEN PFRMD  3/13/2013          Allergies   Allergen Reactions    Hydrochlorothiazide Other (comments)     Hyponatremia, headache      Statins-Hmg-Coa Reductase Inhibitors Unknown (comments)      Family History   Problem Relation Age of Onset  Cancer Mother         stomach    Dementia Mother     Hypertension Mother     Hypertension Father     Cancer Sister         Colon Cancer    Heart Disease Brother     Diabetes Brother     Cancer Brother         Colon Cancer    Heart Disease Son       Social History     Socioeconomic History    Marital status:      Spouse name: Not on file    Number of children: Not on file    Years of education: Not on file    Highest education level: Not on file   Occupational History    Not on file   Social Needs    Financial resource strain: Not on file    Food insecurity     Worry: Not on file     Inability: Not on file   [employer] needs     Medical: Not on file     Non-medical: Not on file   Tobacco Use    Smoking status: Never Smoker    Smokeless tobacco: Never Used   Substance and Sexual Activity    Alcohol use: No    Drug use: No    Sexual activity: Not on file   Lifestyle    Physical activity     Days per week: Not on file     Minutes per session: Not on file    Stress: Not on file   Relationships    Social connections     Talks on phone: Not on file     Gets together: Not on file     Attends Holiness service: Not on file     Active member of club or organization: Not on file     Attends meetings of clubs or organizations: Not on file     Relationship status: Not on file    Intimate partner violence     Fear of current or ex partner: Not on file     Emotionally abused: Not on file     Physically abused: Not on file     Forced sexual activity: Not on file   Other Topics Concern    Not on file   Social History Narrative    Not on file      Current Outpatient Medications   Medication Sig    biotin 5,000 mcg subl 5,000 mcg by SubLINGual route daily.     Cartia  mg capsule Take 1 capsule by mouth once daily    fluticasone propionate (FLONASE) 50 mcg/actuation nasal spray USE 1 SPRAY(S) IN EACH NOSTRIL ONCE DAILY FOR 30 DAYS    famotidine (PEPCID) 20 mg tablet Take 20 mg by mouth nightly. As needed    levothyroxine (SYNTHROID) 75 mcg tablet TAKE 1 TABLET BY MOUTH DAILY ON MONDAY-SATURDAY (BEFORE BREAKFAST)    VIT C/E/ZN/COPPR/LUTEIN/ZEAXAN (PRESERVISION AREDS 2 PO) Take 1 Tab by mouth two (2) times a day.  carboxymethylcellulose sodium (THERATEARS) 0.25 % drop Apply 1 Drop to eye two (2) times a day.  CHOLECALCIFEROL, VITAMIN D3, (VITAMIN D3 PO) Take 1,000 Units by mouth daily.  aspirin delayed-release 81 mg tablet Take 81 mg by mouth daily.  cyanocobalamin (VITAMIN B-12) 500 mcg tablet Take 1,000 mcg by mouth daily. No current facility-administered medications for this visit. Review of Symptoms:  11 systems reviewed, negative other than as stated in the HPI    Physical ExamPhysical Exam:    Vitals:    02/01/21 1012 02/01/21 1027   BP: (!) 142/70 (!) 144/72   Pulse: 66    Resp: 18    SpO2: 98%    Weight: 124 lb 4.8 oz (56.4 kg)    Height: 5' 5\" (1.651 m)      Body mass index is 20.68 kg/m². General PE  Gen:  NAD  Mental Status - Alert. General Appearance - Not in acute distress. HEENT:  PERRL, no carotid bruits or JVD  Chest and Lung Exam   Inspection: Accessory muscles - No use of accessory muscles in breathing. Auscultation:   Breath sounds: - Normal.   Cardiovascular   Inspection: Jugular vein - Bilateral - Inspection Normal.   Palpation/Percussion:   Apical Impulse: - Normal.   Auscultation: Rhythm - Regular. Heart Sounds - S1 WNL and S2 WNL. No S3 or S4. Murmurs & Other Heart Sounds: Auscultation of the heart reveals - No Murmurs. Peripheral Vascular   Upper Extremity: Inspection - Bilateral - No Cyanotic nailbeds or Digital clubbing. Lower Extremity:   Palpation: Edema - Bilateral - No edema. Abdomen:   Soft, non-tender, bowel sounds are active.   Neuro: A&O times 3, CN and motor grossly WNL    Labs:   No results found for: CHOL, CHOLX, CHLST, CHOLV, 868130, HDL, HDLP, LDL, LDLC, DLDLP, TGLX, TRIGL, TRIGP, CHHD, CHHDX  Lab Results   Component Value Date/Time    CK 60 2014 12:15 AM     Lab Results   Component Value Date/Time    Sodium 139 2019 02:00 PM    Potassium 4.4 2019 02:00 PM    Chloride 101 2019 02:00 PM    CO2 31.0 2019 02:00 PM    Anion gap 7 2019 02:00 PM    Glucose 90 2019 02:00 PM    BUN 13.0 2019 02:00 PM    Creatinine 0.5 (L) 2019 02:00 PM    BUN/Creatinine ratio 26 2019 02:00 PM    GFR est AA >60 2019 02:00 PM    GFR est non-AA >60 2019 02:00 PM    Calcium 9.4 2019 02:00 PM    Bilirubin, total 0.5 2019 02:00 PM    Alk. phosphatase 85 2019 02:00 PM    Protein, total 6.7 2019 02:00 PM    Albumin 4.2 2019 02:00 PM    Globulin 2.50 2019 02:00 PM    A-G Ratio 1.7 2019 02:00 PM    ALT (SGPT) 18 2019 02:00 PM       EKG:  SR     Assessment:     Assessment:      1. PSVT (paroxysmal supraventricular tachycardia) ([address]    2. ASHD (arteriosclerotic heart disease)    3. Essential hypertension    4. Dyslipidemia        Orders Placed This Encounter    AMB POC EKG ROUTINE W/ 12 LEADS, INTER & REP     Order Specific Question:   Reason for Exam:     Answer:   ROUTINE    biotin 5,000 mcg subl     Si,000 mcg by SubLINGual route daily. Plan:     1.  History of PSVT: No further palpitations with Diltiazem  2.  Hypertension: Controlled with Diltiazem. Intolerant to Hctz, added to allergy  3.  Hx fatigue, resolved:       Echocardiogram normal , fairly active at 80 yr old, walking a mile a day, either indoors/outdoors. Previous nuclear stress test negative for ischemia in      Continue current care and f/u in 6 months.     Aretta Mcardle, NP       Patient seen and examined by me with nurse practitioner. I personally performed all components of the history, physical, and medical decision making and agree with the assessment and plan as noted. Last blood work reviewed.      Chris Mckeon MD

## 2021-02-19 RX ORDER — DILTIAZEM HYDROCHLORIDE 180 MG/1
CAPSULE, EXTENDED RELEASE ORAL
Qty: 90 CAP | Refills: 0 | Status: SHIPPED | OUTPATIENT
Start: 2021-02-19 | End: 2021-05-17

## 2021-04-01 ENCOUNTER — TELEPHONE (OUTPATIENT)
Dept: CARDIOLOGY CLINIC | Age: 86
End: 2021-04-01

## 2021-04-01 NOTE — TELEPHONE ENCOUNTER
Patient having dental implants on May 14,2021 wants to know if she can stop blood thinners and or aspirins prior and when should she restart. .    [phone]    Thanks  Caio Cary

## 2021-04-01 NOTE — TELEPHONE ENCOUNTER
Called pt,verified pt with two pt identifiers, asked pt how many dental implants she is getting. She is getting 1 implant on 5/14/21. They are not asking for cardiac clearance just clearance on stopping ASA before procedure. Advised I would send to  and call pt back. Message  Received: Today  Message Contents   Bernadette Townsend MD sent to Lester Jolly LPN   Caller: Unspecified (Today, 11:19 AM)             K. She can hold for 5 days. Called pt,verified pt with two pt identifiers, advised pt that  said she could hold her ASA 5 days before her dental implant if needed and resume asap. Pt verbalized understanding.

## 2021-05-17 RX ORDER — DILTIAZEM HYDROCHLORIDE 180 MG/1
CAPSULE, COATED, EXTENDED RELEASE ORAL
Qty: 90 CAP | Refills: 0 | Status: SHIPPED | OUTPATIENT
Start: 2021-05-17 | End: 2021-06-11 | Stop reason: SDUPTHER

## 2021-06-07 ENCOUNTER — TELEPHONE (OUTPATIENT)
Dept: CARDIOLOGY CLINIC | Age: 86
End: 2021-06-07

## 2021-06-07 NOTE — TELEPHONE ENCOUNTER
Returned call,verified pt with two pt identifiers, pt advised for the past 2 days her BP has been elevated. She awoke 2 days ago at 2:30 am with her heart fluttering and since her BP has been elevated. I asked pt for BP numbers and she advised 172/82, 160/ 76 and 192/90 all taken before her am Diltiazem medication. She takes her Diltiazem 180 mg at 8:00 am . She is taking her BP starting sometimes at 5 am. Advised pt her BP will be elevated in am before her med. Advised to take her Diltiazem at 8 am and then an hour or so later take her BP, write it down and call me on Friday this week with her numbers. That will give us a better reading of her BP numbers. Pt verbalized understanding and would do that.

## 2021-06-07 NOTE — TELEPHONE ENCOUNTER
Please call patient complaining of high blood pressure wants to know if we need to adjust her medication      Diltiazem     [phone]    Thanks  Waqas Bee

## 2021-06-07 NOTE — TELEPHONE ENCOUNTER
Joy Kimble daughter on PHI, advised her mom is confused on the instructions given to her. She thinks I am asking her to change the time she is taking her am BP med. Lm Champion the pt can continue to take her am med at 8 am as she is but to change her BP taking to an hour or so after taking her am med. Write the numbers down and call me on Friday with the readings. Joy Kimble verbalized understanding and would relay to pt.

## 2021-06-11 RX ORDER — DILTIAZEM HYDROCHLORIDE 240 MG/1
240 CAPSULE, COATED, EXTENDED RELEASE ORAL DAILY
Qty: 30 CAPSULE | Refills: 5 | Status: SHIPPED | OUTPATIENT
Start: 2021-06-11 | End: 2021-12-03

## 2021-06-11 NOTE — TELEPHONE ENCOUNTER
Cat Landis daughter on PHI called in to relay BP readings. Tuesday- 169/67 HR 77, Wednesday - 148/64 HR 77, Thursday - 152/67 HR 73, and Friday 163/68 HR 76. She is taking her BP a hour after taking her Diltiazem in am.      Pt also awoke on Wednesday at 4:30 am and on Friday at 1:00 am to her heart racing. She has since been to her dr regarding her Thyroid fxn and it was elevated so they reduced her med. Pt also having bowel issues and will be starting Miralax today to help with that. Joan Ovalle I would send this message to  for further advice and if I do not get back to her today then I would call on Monday . She verbalized understanding.

## 2021-06-11 NOTE — TELEPHONE ENCOUNTER
Message  Received: Today  Garett Guo MD sent to Dequan Jones LPN  Caller: Unspecified (4 days ago, 10:04 AM)  Increase diltiazem to 240         Called pt's daughter Adeline Munson on [place], advised  wants to increase her Diltiazem 240 mg daily. Advised she would take same time of day she would normally take her 180 mg tab. Advised to continue to take her BP readings an hour after taking her Diltiazem and to write down. Advised this should help with her BP and the heart racing she is having. Verified pharmacy and we would send that over. Adeline Munson verbalized understanding. Sent refill for Diltiazem 240 mg daily to  for approval and to send to pharmacy.

## 2021-06-29 ENCOUNTER — TELEPHONE (OUTPATIENT)
Dept: CARDIOLOGY CLINIC | Age: 86
End: 2021-06-29

## 2021-06-29 DIAGNOSIS — R55 NEAR SYNCOPE: ICD-10-CM

## 2021-06-29 DIAGNOSIS — I25.10 ASHD (ARTERIOSCLEROTIC HEART DISEASE): Primary | ICD-10-CM

## 2021-06-29 DIAGNOSIS — I95.1 ORTHOSTATIC HYPOTENSION: ICD-10-CM

## 2021-06-29 DIAGNOSIS — I49.3 FREQUENT UNIFOCAL PVCS: ICD-10-CM

## 2021-06-29 DIAGNOSIS — I10 ESSENTIAL HYPERTENSION: ICD-10-CM

## 2021-06-29 DIAGNOSIS — I49.3 PVC'S (PREMATURE VENTRICULAR CONTRACTIONS): ICD-10-CM

## 2021-06-29 DIAGNOSIS — I47.1 PSVT (PAROXYSMAL SUPRAVENTRICULAR TACHYCARDIA) (HCC): ICD-10-CM

## 2021-06-29 NOTE — TELEPHONE ENCOUNTER
Shelly Candelaria called in, daughter on PHI, she advised since the increase of Diltiazem to 240 mg daily the BP has been better. Running 156/65, 138/64 and this am 190/83. Pt has noticed more increased rapid HR that is waking her up at night. Last night she awoke 3 times with the rapid HR. Daughter is worried that this happening more often and wants her to see the dr. Love Carmona appt on 8/16/21 and that is probably the soonest  could see her. Asked if okay with NP visit and daughter said yes. Advised I would send to NP and  and see when we can get her in here. Advised the  will call that appt. Shelly Candelaria verbalized understanding.

## 2021-06-29 NOTE — TELEPHONE ENCOUNTER
Message  Received: Today  Shyam Anderson, NP sent to Yanira Novak LPN  Caller: Unspecified (Today,  8:10 AM)  I was just going to order labs; cbc, bmp, magnesium, tsh and 1 mos event monitor and then keep follow up with Dr Rustam Nation in august.  If we see anything remarkable per event monitor, then we will call. If truly insistent to see NP, shy is willing to see her tomorrow to do the same plan. Thanks             Isha Pattersonr daughter on PHI, advised of note above to her. She advised that in the mean time they have found out if someone has Thyroid issues then it can cause her heart to race like pt is having. They are trying to get her an appt with Endo now for that. Daughter wanted to know what to do. I advised her she can see the earliest she can get pt in with endo first and if not quick enough then she can call me back and we can place our orders and she can proceed with heart aspect. Chelsea Degroot agreed, she will see what can happen with endo first and call me back with the decision. Chelsea Degroot verbalized understanding.

## 2021-06-30 DIAGNOSIS — R00.2 INTERMITTENT PALPITATIONS: Primary | ICD-10-CM

## 2021-06-30 NOTE — TELEPHONE ENCOUNTER
Ria Bean called, daughter on PHI, she wanted to know if best to go to [facility], advised both lab adia and rahul raines is in our system. She verbalized understanding.

## 2021-06-30 NOTE — TELEPHONE ENCOUNTER
Sherry Burks called in , daughter on PHI, she advised they would like to go ahead and order the labs. Advised the last labs I have in cc is from 2019 and they are from [facility], asked if insurance has changed any since. She does not think so. Advised to make sure and call back if [facility] is not on her insurance and I will redo the lab slip. Advised I would have Adam Boogie put in order for event monitor and have Alejandra Pink reach to schedule her. She verbalized understanding.           Ordered labs for pt in cc for [facility].

## 2021-07-01 ENCOUNTER — CLINICAL SUPPORT (OUTPATIENT)
Dept: CARDIOLOGY CLINIC | Age: 86
End: 2021-07-01
Payer: MEDICARE

## 2021-07-01 DIAGNOSIS — I49.3 FREQUENT UNIFOCAL PVCS: ICD-10-CM

## 2021-07-01 DIAGNOSIS — I49.3 PVC'S (PREMATURE VENTRICULAR CONTRACTIONS): ICD-10-CM

## 2021-07-01 DIAGNOSIS — R55 NEAR SYNCOPE: ICD-10-CM

## 2021-07-01 DIAGNOSIS — I47.1 PSVT (PAROXYSMAL SUPRAVENTRICULAR TACHYCARDIA) (HCC): Primary | ICD-10-CM

## 2021-07-01 LAB
BASOPHILS # BLD AUTO: 0 X10E3/UL (ref 0–0.2)
BASOPHILS NFR BLD AUTO: 0 %
BUN SERPL-MCNC: 12 MG/DL (ref 10–36)
BUN/CREAT SERPL: 19 (ref 12–28)
CALCIUM SERPL-MCNC: 9 MG/DL (ref 8.7–10.3)
CHLORIDE SERPL-SCNC: 89 MMOL/L (ref 96–106)
CO2 SERPL-SCNC: 23 MMOL/L (ref 20–29)
CREAT SERPL-MCNC: 0.64 MG/DL (ref 0.57–1)
EOSINOPHIL # BLD AUTO: 0.1 X10E3/UL (ref 0–0.4)
EOSINOPHIL NFR BLD AUTO: 1 %
ERYTHROCYTE [DISTWIDTH] IN BLOOD BY AUTOMATED COUNT: 12.5 % (ref 11.7–15.4)
GLUCOSE SERPL-MCNC: 100 MG/DL (ref 65–99)
HCT VFR BLD AUTO: 36.8 % (ref 34–46.6)
HGB BLD-MCNC: 13.1 G/DL (ref 11.1–15.9)
IMM GRANULOCYTES # BLD AUTO: 0.1 X10E3/UL (ref 0–0.1)
IMM GRANULOCYTES NFR BLD AUTO: 1 %
LYMPHOCYTES # BLD AUTO: 0.9 X10E3/UL (ref 0.7–3.1)
LYMPHOCYTES NFR BLD AUTO: 14 %
MAGNESIUM SERPL-MCNC: 2 MG/DL (ref 1.6–2.3)
MCH RBC QN AUTO: 31.1 PG (ref 26.6–33)
MCHC RBC AUTO-ENTMCNC: 35.6 G/DL (ref 31.5–35.7)
MCV RBC AUTO: 87 FL (ref 79–97)
MONOCYTES # BLD AUTO: 0.8 X10E3/UL (ref 0.1–0.9)
MONOCYTES NFR BLD AUTO: 14 %
NEUTROPHILS # BLD AUTO: 4.2 X10E3/UL (ref 1.4–7)
NEUTROPHILS NFR BLD AUTO: 70 %
PLATELET # BLD AUTO: 262 X10E3/UL (ref 150–450)
POTASSIUM SERPL-SCNC: 4.5 MMOL/L (ref 3.5–5.2)
RBC # BLD AUTO: 4.21 X10E6/UL (ref 3.77–5.28)
SODIUM SERPL-SCNC: 125 MMOL/L (ref 134–144)
T4 SERPL-MCNC: 6.3 UG/DL (ref 4.5–12)
TSH SERPL DL<=0.005 MIU/L-ACNC: 14.5 UIU/ML (ref 0.45–4.5)
WBC # BLD AUTO: 6.1 X10E3/UL (ref 3.4–10.8)

## 2021-07-01 PROCEDURE — 93228 REMOTE 30 DAY ECG REV/REPORT: CPT | Performed by: INTERNAL MEDICINE

## 2021-07-01 NOTE — PROGRESS NOTES
Patient received a 30 day event monitor. Instructions given verbally as well as an instruction sheet. Pt verbalized understanding.     [facility] Event Monitoring

## 2021-07-06 ENCOUNTER — TELEPHONE (OUTPATIENT)
Dept: CARDIOLOGY CLINIC | Age: 86
End: 2021-07-06

## 2021-07-06 NOTE — TELEPHONE ENCOUNTER
Message  Received: Today  Mohini Mendiola MD sent to Heather Quinn LPN  Let her know that TSH is elevated. She needs to follow up with PCP for med adjustment. Called pt's daughter Augusta Gomez on [place], advised pt's labs look good other than her TSH is elevated, gave her the numbers. She asked I faxed over to [phone] to PCP office. She called and got the PCP number earlier. Advised I will certainly do that. She verbalized understanding. Faxed last labs with TSH high lighted. Faxed note to please see attached recent labs on pt, her TSH is elevated. Faxed to PCP at [phone] and received fax confirmation.

## 2021-07-06 NOTE — TELEPHONE ENCOUNTER
Perlaadriana Natalie wants to know if her mom's blood work results are available.        Please call Vitor Estrada back at [phone]

## 2021-07-13 ENCOUNTER — TELEPHONE (OUTPATIENT)
Dept: CARDIOLOGY CLINIC | Age: 86
End: 2021-07-13

## 2021-07-13 NOTE — TELEPHONE ENCOUNTER
Spoke with Pankaj Wiggins who is on HIP[employer]. Informed her that Ms Fang's monitor is working fine. She has sent several symptom transmissions. She is using the monitor properly. Pankaj Wiggins also has concerns about Ms Fang's BP. I informed her to call Dr Plascencia's nurse if any concerning numbers. She verbalized understanding.

## 2021-07-13 NOTE — TELEPHONE ENCOUNTER
Please call Dewey Plata @ [phone]  She is concerned that her mother Gabriela Anthony batteries on her monitor might not be working or transmitting.     Thanks,  edith

## 2021-08-02 ENCOUNTER — TELEPHONE (OUTPATIENT)
Dept: CARDIOLOGY CLINIC | Age: 86
End: 2021-08-02

## 2021-08-02 NOTE — PROGRESS NOTES
Inform her on monitor very brief run of SVT noted. Continue diltiazem. Thyroid management per PCP as previously recommended. No further work up for now.

## 2021-08-02 NOTE — TELEPHONE ENCOUNTER
Spoke with patients daughter. Verified with two patient identifiers. Advised pts daughter per Dr. Akira Webber,  Monitor showed very brief run of SVT noted. Continue diltiazem. Thyroid management per PCP as previously recommended. No further work up for now. Patients daughter verbalized understanding.     Pts daughter states that pt has an appt in September with Endocrinologist.

## 2021-08-02 NOTE — TELEPHONE ENCOUNTER
----- Message from Felicia Garg MD sent at 8/2/2021 12:26 PM EDT -----  Inform her on monitor very brief run of SVT noted. Continue diltiazem. Thyroid management per PCP as previously recommended. No further work up for now.

## 2021-08-16 ENCOUNTER — OFFICE VISIT (OUTPATIENT)
Dept: CARDIOLOGY CLINIC | Age: 86
End: 2021-08-16
Payer: MEDICARE

## 2021-08-16 VITALS
BODY MASS INDEX: 20.03 KG/M2 | OXYGEN SATURATION: 99 % | WEIGHT: 120.2 LBS | DIASTOLIC BLOOD PRESSURE: 69 MMHG | HEART RATE: 80 BPM | SYSTOLIC BLOOD PRESSURE: 138 MMHG | RESPIRATION RATE: 16 BRPM | HEIGHT: 65 IN

## 2021-08-16 DIAGNOSIS — E78.5 DYSLIPIDEMIA: ICD-10-CM

## 2021-08-16 DIAGNOSIS — I25.10 ASHD (ARTERIOSCLEROTIC HEART DISEASE): Primary | ICD-10-CM

## 2021-08-16 DIAGNOSIS — I47.1 PSVT (PAROXYSMAL SUPRAVENTRICULAR TACHYCARDIA) (HCC): ICD-10-CM

## 2021-08-16 DIAGNOSIS — I10 ESSENTIAL HYPERTENSION: ICD-10-CM

## 2021-08-16 PROCEDURE — G8510 SCR DEP NEG, NO PLAN REQD: HCPCS | Performed by: INTERNAL MEDICINE

## 2021-08-16 PROCEDURE — G0463 HOSPITAL OUTPT CLINIC VISIT: HCPCS | Performed by: INTERNAL MEDICINE

## 2021-08-16 PROCEDURE — G8420 CALC BMI NORM PARAMETERS: HCPCS | Performed by: INTERNAL MEDICINE

## 2021-08-16 PROCEDURE — 99214 OFFICE O/P EST MOD 30 MIN: CPT | Performed by: INTERNAL MEDICINE

## 2021-08-16 PROCEDURE — 1101F PT FALLS ASSESS-DOCD LE1/YR: CPT | Performed by: INTERNAL MEDICINE

## 2021-08-16 PROCEDURE — 1090F PRES/ABSN URINE INCON ASSESS: CPT | Performed by: INTERNAL MEDICINE

## 2021-08-16 PROCEDURE — G8427 DOCREV CUR MEDS BY ELIG CLIN: HCPCS | Performed by: INTERNAL MEDICINE

## 2021-08-16 PROCEDURE — G8536 NO DOC ELDER MAL SCRN: HCPCS | Performed by: INTERNAL MEDICINE

## 2021-08-16 RX ORDER — LINACLOTIDE 145 UG/1
CAPSULE, GELATIN COATED ORAL
COMMUNITY
Start: 2021-07-27

## 2021-08-16 RX ORDER — LEVOTHYROXINE SODIUM 50 UG/1
50 TABLET ORAL EVERY OTHER DAY
COMMUNITY
Start: 2021-06-04

## 2021-08-16 RX ORDER — CELECOXIB 200 MG/1
CAPSULE ORAL
COMMUNITY
Start: 2021-07-29

## 2021-08-16 NOTE — PROGRESS NOTES
Chief Complaint   Patient presents with    Coronary Artery Disease    Cholesterol Problem    Irregular Heart Beat    Hypertension     1. Have you been to the ER, urgent care clinic since your last visit? No Hospitalized since your last visit? No  2. Have you seen or consulted any other health care providers outside of the [address] [place] [address] since your last visit? Yes PCP , ENT , physical therapy , dermatologist & Gastro  Include any pap smears or colon screening. No    She had Moderna vaccine no card. Had heart monitor in July has been monitoring BP & heart rate. Has appt with endocrinologist next month.

## 2021-08-16 NOTE — PROGRESS NOTES
[address], [address] [facility]  [phone]     Subjective:      Tiarra Diamond is a 80 y.o. female is here for routine f/u. Last seen by us in 2/2021. Has been having intermittent fast heartbeat. We obtained an event monitor which showed short episode of PSVT, no AF. Her BP has been running good for the most part, occasional spike when she takes celebrex for her back pain. The patient denies chest pain/ shortness of breath, orthopnea, PND, LE edema, syncope, or presyncope.        Patient Active Problem List    Diagnosis Date Noted    Laryngopharyngeal reflux (LPR) 04/11/2019    Nasal polyp 04/11/2019    Frequent unifocal PVCs 04/11/2019    DJD (degenerative joint disease) 08/29/2017    PSVT (paroxysmal supraventricular tachycardia) ([facility] [address] 02/01/2021    Abscess of skin of neck 06/25/2019    PVC's (premature ventricular contractions) 03/15/2019    Orthostatic hypotension 08/28/2018    PE (physical exam), annual 11/29/2017    Abdominal bloating 08/15/2017    Adult onset hypothyroidism 08/15/2017    Anxiety 08/15/2017    ARMD (age related macular degeneration) 08/15/2017    ASHD (arteriosclerotic heart disease) 08/15/2017    Dyslipidemia 08/15/2017    Family history of colon cancer 08/15/2017    Frequent UTI 08/15/2017    H/O basal cell carcinoma excision 08/15/2017    Herpes zoster without complication 65/04/3890    History of TIA (transient ischemic attack) 08/15/2017    [place] (hard of hearing) 08/15/2017    Hypertension 08/15/2017    Leg cramps 08/15/2017    Loss of hair 08/15/2017    Medial meniscus tear 08/15/2017    Mild cognitive impairment with memory loss 08/15/2017    Near syncope 08/15/2017    Osteoporosis 08/15/2017    Urinary incontinence 08/15/2017    Villous adenoma 08/15/2017    Vitamin D deficiency 08/15/2017      Sydnie Williamson MD  Past Medical History:   Diagnosis Date    Abdominal bloating 8/15/2017    Adult onset hypothyroidism 8/15/2017    Anxiety 8/15/2017    ARMD (age related macular degeneration) 8/15/2017    ASHD (arteriosclerotic heart disease) 8/15/2017    Basal cell carcinoma (BCC) 10/2019    Cancer ([facility] [address]     Colon and Skin    Cervical strain 8/15/2017    Dyslipidemia 8/15/2017    Dysphagia 8/15/2017    Family history of colon cancer 8/15/2017    Frequent UTI 8/15/2017    H/O basal cell carcinoma excision 8/15/2017    Herpes zoster without complication 5/37/7841    History of TIA (transient ischemic attack) 8/15/2017    [place] (hard of hearing) 8/15/2017    Hypertension     Leg cramps 8/15/2017    Loss of hair 8/15/2017    Medial meniscus tear 8/15/2017    Menopause     Mild cognitive impairment with memory loss 8/15/2017    Near syncope 8/15/2017    Osteoarthritis of both knees 8/15/2017    Osteoporosis 8/15/2017    Other ill-defined conditions(799.89)     hypercholesterolemia    Thyroid disease     Urinary incontinence 8/15/2017    Villous adenoma 8/15/2017    Vitamin D deficiency 8/15/2017      Past Surgical History:   Procedure Laterality Date    HX CATARACT REMOVAL Bilateral     HX HEENT      partial thyroidectomy    HX HEENT  02/02/2017    redo neck exploration w/completion thyroidectomy    HX OTHER SURGICAL      skin cancer removed    HX TONSILLECTOMY      HX UROLOGICAL      Contigen injections    SC ABDOMEN SURGERY PROC UNLISTED      colon resection    SC COLONOSCOPY FLX DX W/COLLJ SPEC WHEN PFRMD  3/13/2013          Allergies   Allergen Reactions    Hydrochlorothiazide Other (comments)     Hyponatremia, headache      Statins-Hmg-Coa Reductase Inhibitors Unknown (comments)      Family History   Problem Relation Age of Onset    Cancer Mother         stomach    Dementia Mother     Hypertension Mother     Hypertension Father     Cancer Sister         Colon Cancer    Heart Disease Brother     Diabetes Brother     Cancer Brother         Colon Cancer    Heart Disease Son Social History     Socioeconomic History    Marital status:      Spouse name: Not on file    Number of children: Not on file    Years of education: Not on file    Highest education level: Not on file   Occupational History    Not on file   Tobacco Use    Smoking status: Never Smoker    Smokeless tobacco: Never Used   Substance and Sexual Activity    Alcohol use: No    Drug use: No    Sexual activity: Not Currently     Partners: Male   Other Topics Concern    Not on file   Social History Narrative    Not on file     Social Determinants of Health     Financial Resource Strain:     Difficulty of Paying Living Expenses:    Food Insecurity:     Worried About Running Out of Food in the Last Year:     [address] in the Last Year:    Transportation Needs:     Lack of Transportation (Medical):  Lack of Transportation (Non-Medical):    Physical Activity:     Days of Exercise per Week:     Minutes of Exercise per Session:    Stress:     Feeling of Stress :    Social Connections:     Frequency of Communication with Friends and Family:     Frequency of Social Gatherings with Friends and Family:     Attends Gnosticist Services:     Active Member of Clubs or Organizations:     Attends Club or Organization Meetings:     Marital Status:    Intimate Partner Violence:     Fear of Current or Ex-Partner:     Emotionally Abused:     Physically Abused:     Sexually Abused:       Current Outpatient Medications   Medication Sig    celecoxib (CELEBREX) 200 mg capsule TAKE 1 CAPSULE BY MOUTH ONCE DAILY WITH FOOD FOR 30 DAYS    Linzess 145 mcg cap capsule TAKE 1 CAPSULE BY MOUTH ONCE DAILY IN THE MORNING    levothyroxine (SYNTHROID) 50 mcg tablet Take 50 mcg by mouth every other day.  dilTIAZem ER (CARDIZEM CD) 240 mg capsule Take 1 Capsule by mouth daily.     levothyroxine (SYNTHROID) 75 mcg tablet TAKE 1 TABLET BY MOUTH DAILY ON MONDAY-SATURDAY (BEFORE BREAKFAST) (Patient taking differently: Take 75 mcg by mouth every other day.)    VIT C/E/ZN/COPPR/LUTEIN/ZEAXAN (PRESERVISION AREDS 2 PO) Take 1 Tab by mouth two (2) times a day.  carboxymethylcellulose sodium (THERATEARS) 0.25 % drop Apply 1 Drop to eye two (2) times a day.  CHOLECALCIFEROL, VITAMIN D3, (VITAMIN D3 PO) Take 1,000 Units by mouth daily.  cyanocobalamin (VITAMIN B-12) 500 mcg tablet Take 1,000 mcg by mouth daily.  biotin 5,000 mcg subl 5,000 mcg by SubLINGual route daily. (Patient not taking: Reported on 8/16/2021)    aspirin delayed-release 81 mg tablet Take 81 mg by mouth daily. (Patient not taking: Reported on 8/16/2021)     No current facility-administered medications for this visit. Review of Symptoms:  11 systems reviewed, negative other than as stated in the HPI    Physical ExamPhysical Exam:    Vitals:    08/16/21 1007 08/16/21 1030 08/16/21 1031   BP: (!) 140/70 130/70 138/69   Pulse: 80     Resp: 16     SpO2: 99%     Weight: 120 lb 3.2 oz (54.5 kg)     Height: 5' 5\" (1.651 m)       Body mass index is 20 kg/m². General PE  Gen:  NAD  Mental Status - Alert. General Appearance - Not in acute distress. HEENT:  PERRL, no carotid bruits or JVD  Chest and Lung Exam   Inspection: Accessory muscles - No use of accessory muscles in breathing. Auscultation:   Breath sounds: - Normal.   Cardiovascular   Inspection: Jugular vein - Bilateral - Inspection Normal.   Palpation/Percussion:   Apical Impulse: - Normal.   Auscultation: Rhythm - Regular. Heart Sounds - S1 WNL and S2 WNL. No S3 or S4. Murmurs & Other Heart Sounds: Auscultation of the heart reveals - No Murmurs. Peripheral Vascular   Upper Extremity: Inspection - Bilateral - No Cyanotic nailbeds or Digital clubbing. Lower Extremity:   Palpation: Edema - Bilateral - No edema. Abdomen:   Soft, non-tender, bowel sounds are active.   Neuro: A&O times 3, CN and motor grossly WNL    Labs:   No results found for: CHOL, [facility], [address] [place] [address], [address] [place] [address] Rd, [id number], HDL, HDLP, LDL, LDLC, DLDLP, TGLX, TRIGL, TRIGP, [facility], [facility]  Lab Results   Component Value Date/Time    CK 60 01/17/2014 12:15 AM     Lab Results   Component Value Date/Time    Sodium 125 (L) 06/30/2021 01:13 PM    Potassium 4.5 06/30/2021 01:13 PM    Chloride 89 (L) 06/30/2021 01:13 PM    CO2 23 06/30/2021 01:13 PM    Anion gap 7 06/14/2019 02:00 PM    Glucose 100 (H) 06/30/2021 01:13 PM    BUN 12 06/30/2021 01:13 PM    Creatinine 0.64 06/30/2021 01:13 PM    BUN/Creatinine ratio 19 06/30/2021 01:13 PM    GFR est AA 90 06/30/2021 01:13 PM    GFR est non-AA 78 06/30/2021 01:13 PM    Calcium 9.0 06/30/2021 01:13 PM    Bilirubin, total 0.5 06/14/2019 02:00 PM    Alk. phosphatase 85 06/14/2019 02:00 PM    Protein, total 6.7 06/14/2019 02:00 PM    Albumin 4.2 06/14/2019 02:00 PM    Globulin 2.50 06/14/2019 02:00 PM    A-G Ratio 1.7 06/14/2019 02:00 PM    ALT (SGPT) 18 06/14/2019 02:00 PM       EKG:         Assessment:     Assessment:        ICD-10-CM ICD-9-CM    1. ASHD (arteriosclerotic heart disease)  I25.10 414.00 CANCELED: AMB POC EKG ROUTINE W/ 12 LEADS, INTER & REP   2. Dyslipidemia  E78.5 272.4 CANCELED: AMB POC EKG ROUTINE W/ 12 LEADS, INTER & REP   3. PSVT (paroxysmal supraventricular tachycardia) (HCC)  I47.1 427.0    4. Essential hypertension  I10 401.9 CANCELED: AMB POC EKG ROUTINE W/ 12 LEADS, INTER & REP       Orders Placed This Encounter    celecoxib (CELEBREX) 200 mg capsule     Sig: TAKE 1 CAPSULE BY MOUTH ONCE DAILY WITH FOOD FOR 30 DAYS    Linzess 145 mcg cap capsule     Sig: TAKE 1 CAPSULE BY MOUTH ONCE DAILY IN THE MORNING    levothyroxine (SYNTHROID) 50 mcg tablet     Sig: Take 50 mcg by mouth every other day. Plan:     1. History of PSVT: event monitor 8/2021 showed short episode of PSVT. Continue Diltiazem 240 mg daily. TSH elev 6/2021; She will establish care with Dr Vandana Sorto in September for thyroid mgmt.   2. Noted echo in 2018: normal EF, mild-mod MR.   3. Hypertension: Controlled with Diltiazem. Intolerant to Hctz, added to allergy  4. Hx fatigue, resolved:       Echocardiogram normal 2018, fairly active at 80 yr old, walking a mile a day, either indoors/outdoors.      Previous nuclear stress test negative for ischemia in 2014    Continue current care and f/u in 6 months. Henna Bell NP       Patient seen and examined by me with nurse practitioner. I personally performed all components of the history, physical, and medical decision making and agree with the assessment and plan as noted. BP controlled. Recent labs reviewed. No cardiac symptoms. Continue current meds.      Melisa Ott MD

## 2021-08-23 ENCOUNTER — TRANSCRIBE ORDER (OUTPATIENT)
Dept: REGISTRATION | Age: 86
End: 2021-08-23

## 2021-08-23 ENCOUNTER — HOSPITAL ENCOUNTER (OUTPATIENT)
Dept: GENERAL RADIOLOGY | Age: 86
Discharge: HOME | End: 2021-08-23
Payer: MEDICARE

## 2021-08-23 DIAGNOSIS — K59.00 CONSTIPATION: ICD-10-CM

## 2021-08-23 DIAGNOSIS — R14.0 GASTRIC TYMPANY: Primary | ICD-10-CM

## 2021-08-23 DIAGNOSIS — R14.0 GASTRIC TYMPANY: ICD-10-CM

## 2021-08-23 PROCEDURE — 74018 RADEX ABDOMEN 1 VIEW: CPT

## 2021-09-03 ENCOUNTER — TRANSCRIBE ORDER (OUTPATIENT)
Dept: SCHEDULING | Age: 86
End: 2021-09-03

## 2021-09-03 DIAGNOSIS — R14.0 ABDOMINAL DISTENTION: ICD-10-CM

## 2021-09-03 DIAGNOSIS — R63.0 LOSS OF APPETITE: ICD-10-CM

## 2021-09-03 DIAGNOSIS — K59.04 CHRONIC IDIOPATHIC CONSTIPATION: ICD-10-CM

## 2021-09-03 DIAGNOSIS — Z80.0 FAMILY HISTORY OF COLON CANCER: ICD-10-CM

## 2021-09-03 DIAGNOSIS — R63.4 ABNORMAL WEIGHT LOSS: ICD-10-CM

## 2021-09-03 DIAGNOSIS — R11.0 NAUSEA: ICD-10-CM

## 2021-09-03 DIAGNOSIS — Z85.038 PERSONAL HISTORY OF COLON CANCER: Primary | ICD-10-CM

## 2021-09-10 ENCOUNTER — TRANSCRIBE ORDER (OUTPATIENT)
Dept: SCHEDULING | Age: 86
End: 2021-09-10

## 2021-09-10 DIAGNOSIS — M54.16 LUMBAR RADICULOPATHY: ICD-10-CM

## 2021-09-10 DIAGNOSIS — S32.020A: Primary | ICD-10-CM

## 2021-09-22 ENCOUNTER — TRANSCRIBE ORDER (OUTPATIENT)
Dept: SCHEDULING | Age: 86
End: 2021-09-22

## 2021-09-22 DIAGNOSIS — E04.1 THYROID NODULE: ICD-10-CM

## 2021-09-22 DIAGNOSIS — Z13.6 ENCOUNTER FOR SCREENING FOR CARDIOVASCULAR DISORDERS: Primary | ICD-10-CM

## 2021-09-22 DIAGNOSIS — E03.9 HYPOTHYROIDISM: Primary | ICD-10-CM

## 2021-09-22 DIAGNOSIS — Z12.31 VISIT FOR SCREENING MAMMOGRAM: Primary | ICD-10-CM

## 2021-09-22 DIAGNOSIS — R00.2 PALPITATIONS: ICD-10-CM

## 2021-09-22 DIAGNOSIS — E04.2 NONTOXIC MULTINODULAR GOITER: ICD-10-CM

## 2021-09-24 ENCOUNTER — HOSPITAL ENCOUNTER (OUTPATIENT)
Dept: MRI IMAGING | Age: 86
Discharge: HOME | End: 2021-09-24
Attending: ORTHOPAEDIC SURGERY
Payer: MEDICARE

## 2021-09-24 DIAGNOSIS — S32.020A: ICD-10-CM

## 2021-09-24 DIAGNOSIS — M54.16 LUMBAR RADICULOPATHY: ICD-10-CM

## 2021-09-24 PROCEDURE — 72148 MRI LUMBAR SPINE W/O DYE: CPT

## 2021-10-06 ENCOUNTER — HOSPITAL ENCOUNTER (OUTPATIENT)
Dept: ULTRASOUND IMAGING | Age: 86
Discharge: HOME | End: 2021-10-06
Attending: SPECIALIST
Payer: MEDICARE

## 2021-10-06 ENCOUNTER — HOSPITAL ENCOUNTER (OUTPATIENT)
Dept: CT IMAGING | Age: 86
Discharge: HOME | End: 2021-10-06
Attending: PHYSICIAN ASSISTANT
Payer: MEDICARE

## 2021-10-06 DIAGNOSIS — R63.0 LOSS OF APPETITE: ICD-10-CM

## 2021-10-06 DIAGNOSIS — E03.9 HYPOTHYROIDISM: ICD-10-CM

## 2021-10-06 DIAGNOSIS — E04.2 NONTOXIC MULTINODULAR GOITER: ICD-10-CM

## 2021-10-06 DIAGNOSIS — R63.4 ABNORMAL WEIGHT LOSS: ICD-10-CM

## 2021-10-06 DIAGNOSIS — K59.04 CHRONIC IDIOPATHIC CONSTIPATION: ICD-10-CM

## 2021-10-06 DIAGNOSIS — R14.0 ABDOMINAL DISTENTION: ICD-10-CM

## 2021-10-06 DIAGNOSIS — Z85.038 PERSONAL HISTORY OF COLON CANCER: ICD-10-CM

## 2021-10-06 DIAGNOSIS — R11.0 NAUSEA: ICD-10-CM

## 2021-10-06 DIAGNOSIS — E04.1 THYROID NODULE: ICD-10-CM

## 2021-10-06 DIAGNOSIS — R00.2 PALPITATIONS: ICD-10-CM

## 2021-10-06 DIAGNOSIS — Z80.0 FAMILY HISTORY OF COLON CANCER: ICD-10-CM

## 2021-10-06 LAB — CREAT BLD-MCNC: 0.5 MG/DL (ref 0.6–1.3)

## 2021-10-06 PROCEDURE — 82565 ASSAY OF CREATININE: CPT

## 2021-10-06 PROCEDURE — 76536 US EXAM OF HEAD AND NECK: CPT

## 2021-10-06 PROCEDURE — 74011000636 HC RX REV CODE- 636: Performed by: PHYSICIAN ASSISTANT

## 2021-10-06 PROCEDURE — 74177 CT ABD & PELVIS W/CONTRAST: CPT

## 2021-10-06 RX ORDER — BARIUM SULFATE 20 MG/ML
900 SUSPENSION ORAL
Status: DISCONTINUED | OUTPATIENT
Start: 2021-10-06 | End: 2021-10-06

## 2021-10-06 RX ADMIN — IOPAMIDOL 100 ML: 755 INJECTION, SOLUTION INTRAVENOUS at 10:50

## 2021-10-06 RX ADMIN — IOHEXOL 50 ML: 240 INJECTION, SOLUTION INTRATHECAL; INTRAVASCULAR; INTRAVENOUS; ORAL at 10:51

## 2021-12-03 RX ORDER — DILTIAZEM HYDROCHLORIDE 240 MG/1
CAPSULE, COATED, EXTENDED RELEASE ORAL
Qty: 30 CAPSULE | Refills: 0 | Status: SHIPPED | OUTPATIENT
Start: 2021-12-03 | End: 2022-01-04

## 2022-01-04 RX ORDER — DILTIAZEM HYDROCHLORIDE 240 MG/1
CAPSULE, COATED, EXTENDED RELEASE ORAL
Qty: 30 CAPSULE | Refills: 0 | Status: SHIPPED | OUTPATIENT
Start: 2022-01-04 | End: 2022-01-31 | Stop reason: SDUPTHER

## 2022-01-04 RX ORDER — DILTIAZEM HYDROCHLORIDE 240 MG/1
240 CAPSULE, COATED, EXTENDED RELEASE ORAL DAILY
Qty: 90 CAPSULE | Refills: 0 | OUTPATIENT
Start: 2022-01-04

## 2022-01-10 ENCOUNTER — TRANSCRIBE ORDER (OUTPATIENT)
Dept: SCHEDULING | Age: 87
End: 2022-01-10

## 2022-01-10 DIAGNOSIS — L03.119 CELLULITIS OF LOWER EXTREMITY, UNSPECIFIED LATERALITY: Primary | ICD-10-CM

## 2022-01-10 DIAGNOSIS — M79.89 SWELLING OF LOWER EXTREMITY: ICD-10-CM

## 2022-01-10 DIAGNOSIS — I89.8 LYMPHORRHEA: ICD-10-CM

## 2022-01-10 DIAGNOSIS — L81.9 DISCOLORATION OF SKIN: ICD-10-CM

## 2022-01-18 ENCOUNTER — HOSPITAL ENCOUNTER (OUTPATIENT)
Dept: VASCULAR SURGERY | Age: 87
Discharge: HOME | End: 2022-01-18
Attending: NURSE PRACTITIONER
Payer: MEDICARE

## 2022-01-18 DIAGNOSIS — L81.9 DISCOLORATION OF SKIN: ICD-10-CM

## 2022-01-18 DIAGNOSIS — M79.89 SWELLING OF LOWER EXTREMITY: ICD-10-CM

## 2022-01-18 DIAGNOSIS — I89.8 LYMPHORRHEA: ICD-10-CM

## 2022-01-18 DIAGNOSIS — L03.119 CELLULITIS OF LOWER EXTREMITY, UNSPECIFIED LATERALITY: ICD-10-CM

## 2022-01-18 PROCEDURE — 93922 UPR/L XTREMITY ART 2 LEVELS: CPT

## 2022-01-19 LAB
LEFT ABI: 1.36
LEFT ARM BP: 163 MMHG
LEFT POSTERIOR TIBIAL: 223 MMHG
LEFT TBI: 0.86
LEFT TOE PRESSURE: 141 MMHG
RIGHT ABI: 1.3
RIGHT ARM BP: 164 MMHG
RIGHT POSTERIOR TIBIAL: 213 MMHG
RIGHT TBI: 1.13
RIGHT TOE PRESSURE: 185 MMHG
VAS LEFT DORSALIS PEDIS BP: 211 MMHG
VAS RIGHT DORSALIS PEDIS BP: 214 MMHG

## 2022-01-31 RX ORDER — DILTIAZEM HYDROCHLORIDE 240 MG/1
240 CAPSULE, COATED, EXTENDED RELEASE ORAL DAILY
Qty: 30 CAPSULE | Refills: 0 | Status: SHIPPED | OUTPATIENT
Start: 2022-01-31 | End: 2022-03-07

## 2022-02-15 ENCOUNTER — OFFICE VISIT (OUTPATIENT)
Dept: CARDIOLOGY CLINIC | Age: 87
End: 2022-02-15
Payer: MEDICARE

## 2022-02-15 VITALS
RESPIRATION RATE: 18 BRPM | WEIGHT: 116.5 LBS | BODY MASS INDEX: 19.41 KG/M2 | DIASTOLIC BLOOD PRESSURE: 64 MMHG | HEIGHT: 65 IN | OXYGEN SATURATION: 99 % | SYSTOLIC BLOOD PRESSURE: 142 MMHG | HEART RATE: 91 BPM

## 2022-02-15 DIAGNOSIS — I10 PRIMARY HYPERTENSION: Primary | ICD-10-CM

## 2022-02-15 DIAGNOSIS — M79.89 LEG SWELLING: ICD-10-CM

## 2022-02-15 DIAGNOSIS — I25.10 ASHD (ARTERIOSCLEROTIC HEART DISEASE): ICD-10-CM

## 2022-02-15 DIAGNOSIS — I47.1 PSVT (PAROXYSMAL SUPRAVENTRICULAR TACHYCARDIA) (HCC): ICD-10-CM

## 2022-02-15 PROCEDURE — 99214 OFFICE O/P EST MOD 30 MIN: CPT | Performed by: INTERNAL MEDICINE

## 2022-02-15 PROCEDURE — 1101F PT FALLS ASSESS-DOCD LE1/YR: CPT | Performed by: INTERNAL MEDICINE

## 2022-02-15 PROCEDURE — 1090F PRES/ABSN URINE INCON ASSESS: CPT | Performed by: INTERNAL MEDICINE

## 2022-02-15 PROCEDURE — G8427 DOCREV CUR MEDS BY ELIG CLIN: HCPCS | Performed by: INTERNAL MEDICINE

## 2022-02-15 PROCEDURE — G8510 SCR DEP NEG, NO PLAN REQD: HCPCS | Performed by: INTERNAL MEDICINE

## 2022-02-15 PROCEDURE — G0463 HOSPITAL OUTPT CLINIC VISIT: HCPCS | Performed by: INTERNAL MEDICINE

## 2022-02-15 PROCEDURE — G8536 NO DOC ELDER MAL SCRN: HCPCS | Performed by: INTERNAL MEDICINE

## 2022-02-15 PROCEDURE — 93010 ELECTROCARDIOGRAM REPORT: CPT | Performed by: INTERNAL MEDICINE

## 2022-02-15 PROCEDURE — 93005 ELECTROCARDIOGRAM TRACING: CPT | Performed by: INTERNAL MEDICINE

## 2022-02-15 PROCEDURE — G8420 CALC BMI NORM PARAMETERS: HCPCS | Performed by: INTERNAL MEDICINE

## 2022-02-15 RX ORDER — GUAIFENESIN 1200 MG
TABLET, EXTENDED RELEASE 12 HR ORAL AS NEEDED
COMMUNITY

## 2022-02-15 RX ORDER — ISOSORBIDE MONONITRATE 30 MG/1
30 TABLET, EXTENDED RELEASE ORAL DAILY
Qty: 30 TABLET | Refills: 3 | Status: SHIPPED | OUTPATIENT
Start: 2022-02-15

## 2022-02-15 RX ORDER — POTASSIUM CHLORIDE 1500 MG/1
20 TABLET, FILM COATED, EXTENDED RELEASE ORAL DAILY
Qty: 30 TABLET | Refills: 3 | Status: SHIPPED | OUTPATIENT
Start: 2022-02-15

## 2022-02-15 RX ORDER — POTASSIUM CHLORIDE 750 MG/1
TABLET, FILM COATED, EXTENDED RELEASE ORAL
COMMUNITY
Start: 2022-01-31 | End: 2022-02-15

## 2022-02-15 RX ORDER — FUROSEMIDE 20 MG/1
TABLET ORAL
COMMUNITY
Start: 2021-12-09

## 2022-02-15 RX ORDER — ABALOPARATIDE 2000 UG/ML
INJECTION, SOLUTION SUBCUTANEOUS
COMMUNITY
Start: 2021-10-19

## 2022-02-15 NOTE — PROGRESS NOTES
1. Have you been to the ER, urgent care clinic since your last visit? Hospitalized since your last visit? No    2. Have you seen or consulted any other health care providers outside of the [address] [place] [address] since your last visit? Include any pap smears or colon screening.  No         Chief Complaint   Patient presents with    Hypertension     C/O  Leg swelling, Leg weakness

## 2022-02-15 NOTE — PROGRESS NOTES
2/15/2022 9:47 AM      Subjective:     Jhon Fang is seen in office today with c/o leg swelling and at time leg fluid drainage. Since started on lasix, slightly better. Had compression fractures of back and sine then less mobile. She denies chest pain, chest pressure/discomfort, dyspnea, palpitations, irregular heart beats, near-syncope, syncope, fatigue, orthopnea, paroxysmal nocturnal dyspnea, exertional chest pressure/discomfort. Visit Vitals  BP (!) 142/64 (BP 1 Location: Left upper arm, BP Patient Position: Sitting, BP Cuff Size: Adult)   Pulse 91   Resp 18   Ht 5' 5\" (1.651 m)   Wt 116 lb 8 oz (52.8 kg)   LMP  (LMP Unknown)   SpO2 99%   BMI 19.39 kg/m²     Current Outpatient Medications   Medication Sig    abaloparatide (Tymlos) 80 mcg (3,120 mcg/1.56 mL) pnij     furosemide (LASIX) 20 mg tablet TAKE 1 TABLET BY MOUTH ONCE DAILY IN THE MORNING TO REDUCE FLUID IN LEGS    acetaminophen (TylenoL) 325 mg cap Take  by mouth as needed.  isosorbide mononitrate ER (IMDUR) 30 mg tablet Take 1 Tablet by mouth daily.  potassium chloride SR (K-TAB) 20 mEq tablet Take 1 Tablet by mouth daily.  dilTIAZem ER (CARDIZEM CD) 240 mg capsule Take 1 Capsule by mouth daily.  levothyroxine (SYNTHROID) 75 mcg tablet TAKE 1 TABLET BY MOUTH DAILY ON MONDAY-SATURDAY (BEFORE BREAKFAST) (Patient taking differently: Take 75 mcg by mouth every other day.)    VIT C/E/ZN/COPPR/LUTEIN/ZEAXAN (PRESERVISION AREDS 2 PO) Take 1 Tab by mouth two (2) times a day.  carboxymethylcellulose sodium (THERATEARS) 0.25 % drop Apply 1 Drop to eye two (2) times a day.  cyanocobalamin (VITAMIN B-12) 500 mcg tablet Take 1,000 mcg by mouth daily.  celecoxib (CELEBREX) 200 mg capsule TAKE 1 CAPSULE BY MOUTH ONCE DAILY WITH FOOD FOR 30 DAYS    Linzess 145 mcg cap capsule TAKE 1 CAPSULE BY MOUTH ONCE DAILY IN THE MORNING    levothyroxine (SYNTHROID) 50 mcg tablet Take 50 mcg by mouth every other day. (Patient not taking: Reported on 2/15/2022)    biotin 5,000 mcg subl 5,000 mcg by SubLINGual route daily. (Patient not taking: Reported on 8/16/2021)    CHOLECALCIFEROL, VITAMIN D3, (VITAMIN D3 PO) Take 1,000 Units by mouth daily.  aspirin delayed-release 81 mg tablet Take 81 mg by mouth daily. (Patient not taking: Reported on 8/16/2021)     No current facility-administered medications for this visit.          Objective:      Visit Vitals  BP (!) 142/64 (BP 1 Location: Left upper arm, BP Patient Position: Sitting, BP Cuff Size: Adult)   Pulse 91   Resp 18   Ht 5' 5\" (1.651 m)   Wt 116 lb 8 oz (52.8 kg)   SpO2 99%   BMI 19.39 kg/m²       Past Medical History:   Diagnosis Date    Abdominal bloating 8/15/2017    Adult onset hypothyroidism 8/15/2017    Anxiety 8/15/2017    ARMD (age related macular degeneration) 8/15/2017    ASHD (arteriosclerotic heart disease) 8/15/2017    Basal cell carcinoma (BCC) 10/2019    Cancer ([facility] [address]     Colon and Skin    Cervical strain 8/15/2017    Dyslipidemia 8/15/2017    Dysphagia 8/15/2017    Family history of colon cancer 8/15/2017    Frequent UTI 8/15/2017    H/O basal cell carcinoma excision 8/15/2017    Herpes zoster without complication 1/14/8482    History of TIA (transient ischemic attack) 8/15/2017    [place] (hard of hearing) 8/15/2017    Hypertension     Leg cramps 8/15/2017    Loss of hair 8/15/2017    Medial meniscus tear 8/15/2017    Menopause     Mild cognitive impairment with memory loss 8/15/2017    Near syncope 8/15/2017    Osteoarthritis of both knees 8/15/2017    Osteoporosis 8/15/2017    Other ill-defined conditions(799.89)     hypercholesterolemia    Thyroid disease     Urinary incontinence 8/15/2017    Villous adenoma 8/15/2017    Vitamin D deficiency 8/15/2017      Past Surgical History:   Procedure Laterality Date    HX CATARACT REMOVAL Bilateral     HX HEENT      partial thyroidectomy    HX HEENT  02/02/2017    redo neck exploration w/completion thyroidectomy    HX OTHER SURGICAL      skin cancer removed    HX TONSILLECTOMY      HX UROLOGICAL      Contigen injections    [facility] ABDOMEN SURGERY PROC UNLISTED      colon resection    VA COLONOSCOPY FLX DX W/COLLJ SPEC WHEN PFRMD  3/13/2013          Allergies   Allergen Reactions    Hydrochlorothiazide Other (comments)     Hyponatremia, headache      Statins-Hmg-Coa Reductase Inhibitors Unknown (comments)      Family History   Problem Relation Age of Onset    Cancer Mother         stomach    Dementia Mother     Hypertension Mother     Hypertension Father     Cancer Sister         Colon Cancer    Heart Disease Brother     Diabetes Brother     Cancer Brother         Colon Cancer    Heart Disease Son       Social History     Socioeconomic History    Marital status:      Spouse name: Not on file    Number of children: Not on file    Years of education: Not on file    Highest education level: Not on file   Occupational History    Not on file   Tobacco Use    Smoking status: Never Smoker    Smokeless tobacco: Never Used   Vaping Use    Vaping Use: Never used   Substance and Sexual Activity    Alcohol use: No    Drug use: No    Sexual activity: Not Currently     Partners: Male   Other Topics Concern    Not on file   Social History Narrative    Not on file     Social Determinants of Health     Financial Resource Strain:     Difficulty of Paying Living Expenses: Not on file   Food Insecurity:     Worried About Running Out of Food in the Last Year: Not on file    [place] of Food in the Last Year: Not on file   Transportation Needs:     Lack of Transportation (Medical): Not on file    Lack of Transportation (Non-Medical):  Not on file   Physical Activity:     Days of Exercise per Week: Not on file    Minutes of Exercise per Session: Not on file   Stress:     Feeling of Stress : Not on file   Social Connections:     Frequency of Communication with Friends and Family: Not on file    Frequency of Social Gatherings with Friends and Family: Not on file    Attends Congregation Services: Not on file    Active Member of Clubs or Organizations: Not on file    Attends Club or Organization Meetings: Not on file    Marital Status: Not on file   Intimate Partner Violence:     Fear of Current or Ex-Partner: Not on file    Emotionally Abused: Not on file    Physically Abused: Not on file    Sexually Abused: Not on file   Housing Stability:     Unable to Pay for Housing in the Last Year: Not on file    Number of [place] in the Last Year: Not on file    Unstable Housing in the Last Year: Not on file       Assessment:       ICD-10-CM ICD-9-CM    1. Primary hypertension  I10 401.9 DUPLEX LOWER EXT VENOUS BILAT      METABOLIC PANEL, BASIC      ECHO ADULT COMPLETE   2. ASHD (arteriosclerotic heart disease)  I25.10 414.00 AMB POC EKG ROUTINE W/ 12 LEADS, INTER & REP      DUPLEX LOWER EXT VENOUS BILAT      METABOLIC PANEL, BASIC      ECHO ADULT COMPLETE   3. PSVT (paroxysmal supraventricular tachycardia) ([facility])  I47.1 427.0 DUPLEX LOWER EXT VENOUS BILAT      METABOLIC PANEL, BASIC      ECHO ADULT COMPLETE   4. Leg swelling  M79.89 729.81 DUPLEX LOWER EXT VENOUS BILAT      METABOLIC PANEL, BASIC      ECHO ADULT COMPLETE       Plan:     History of PSVT: event monitor 8/2021 showed short episode of PSVT. Continue Diltiazem 240 mg daily. Stable. Leg swelling: check venous reflux study and Echo. Increase lasix to 20 mg daily and Kdur to 20 meq daily. F/u BMP in 2 wks. HTN: Elevated. add imdur.

## 2022-03-07 RX ORDER — DILTIAZEM HYDROCHLORIDE 240 MG/1
CAPSULE, COATED, EXTENDED RELEASE ORAL
Qty: 30 CAPSULE | Refills: 0 | Status: SHIPPED | OUTPATIENT
Start: 2022-03-07 | End: 2022-04-04

## 2022-03-18 ENCOUNTER — TELEPHONE (OUTPATIENT)
Dept: CARDIOLOGY CLINIC | Age: 87
End: 2022-03-18

## 2022-03-18 PROBLEM — R32 URINARY INCONTINENCE: Status: ACTIVE | Noted: 2017-08-15

## 2022-03-18 PROBLEM — D48.9 VILLOUS ADENOMA: Status: ACTIVE | Noted: 2017-08-15

## 2022-03-18 PROBLEM — I95.1 ORTHOSTATIC HYPOTENSION: Status: ACTIVE | Noted: 2018-08-28

## 2022-03-18 PROBLEM — Z85.828 H/O BASAL CELL CARCINOMA EXCISION: Status: ACTIVE | Noted: 2017-08-15

## 2022-03-18 PROBLEM — Z98.890 H/O BASAL CELL CARCINOMA EXCISION: Status: ACTIVE | Noted: 2017-08-15

## 2022-03-18 PROBLEM — S83.249A MEDIAL MENISCUS TEAR: Status: ACTIVE | Noted: 2017-08-15

## 2022-03-18 PROBLEM — Z86.73 HISTORY OF TIA (TRANSIENT ISCHEMIC ATTACK): Status: ACTIVE | Noted: 2017-08-15

## 2022-03-18 PROBLEM — G31.84 MILD COGNITIVE IMPAIRMENT WITH MEMORY LOSS: Status: ACTIVE | Noted: 2017-08-15

## 2022-03-18 PROBLEM — M19.90 DJD (DEGENERATIVE JOINT DISEASE): Status: ACTIVE | Noted: 2017-08-29

## 2022-03-18 PROBLEM — R55 NEAR SYNCOPE: Status: ACTIVE | Noted: 2017-08-15

## 2022-03-18 PROBLEM — I25.10 ASHD (ARTERIOSCLEROTIC HEART DISEASE): Status: ACTIVE | Noted: 2017-08-15

## 2022-03-18 PROBLEM — K21.9 LARYNGOPHARYNGEAL REFLUX (LPR): Status: ACTIVE | Noted: 2019-04-11

## 2022-03-18 PROBLEM — E78.5 DYSLIPIDEMIA: Status: ACTIVE | Noted: 2017-08-15

## 2022-03-18 PROBLEM — M81.0 OSTEOPOROSIS: Status: ACTIVE | Noted: 2017-08-15

## 2022-03-18 PROBLEM — I10 HYPERTENSION: Status: ACTIVE | Noted: 2017-08-15

## 2022-03-18 LAB
BUN SERPL-MCNC: 29 MG/DL (ref 10–36)
BUN/CREAT SERPL: 36 (ref 12–28)
CALCIUM SERPL-MCNC: 11.5 MG/DL (ref 8.7–10.3)
CHLORIDE SERPL-SCNC: 101 MMOL/L (ref 96–106)
CO2 SERPL-SCNC: 25 MMOL/L (ref 20–29)
CREAT SERPL-MCNC: 0.81 MG/DL (ref 0.57–1)
EGFR: 68 ML/MIN/1.73
GLUCOSE SERPL-MCNC: 154 MG/DL (ref 65–99)
INTERPRETATION: NORMAL
POTASSIUM SERPL-SCNC: 5.2 MMOL/L (ref 3.5–5.2)
SODIUM SERPL-SCNC: 143 MMOL/L (ref 134–144)

## 2022-03-18 NOTE — TELEPHONE ENCOUNTER
----- Message from Orion Heaton MD sent at 3/18/2022  9:21 AM EDT -----  Inform her labs are k. Continue current meds. Called pt, spoke to Corrinne Augusta on [facility], advised pt's labs are normal. Continue current meds. Advised to keep appt for testing. Corrinne Augusta verbalized understanding.

## 2022-03-19 PROBLEM — Z00.00 PE (PHYSICAL EXAM), ANNUAL: Status: ACTIVE | Noted: 2017-11-29

## 2022-03-19 PROBLEM — M79.89 LEG SWELLING: Status: ACTIVE | Noted: 2022-02-15

## 2022-03-19 PROBLEM — B02.9 HERPES ZOSTER WITHOUT COMPLICATION: Status: ACTIVE | Noted: 2017-08-15

## 2022-03-19 PROBLEM — H35.30 ARMD (AGE RELATED MACULAR DEGENERATION): Status: ACTIVE | Noted: 2017-08-15

## 2022-03-19 PROBLEM — R25.2 LEG CRAMPS: Status: ACTIVE | Noted: 2017-08-15

## 2022-03-19 PROBLEM — E55.9 VITAMIN D DEFICIENCY: Status: ACTIVE | Noted: 2017-08-15

## 2022-03-19 PROBLEM — I49.3 FREQUENT UNIFOCAL PVCS: Status: ACTIVE | Noted: 2019-04-11

## 2022-03-19 PROBLEM — E03.8 ADULT ONSET HYPOTHYROIDISM: Status: ACTIVE | Noted: 2017-08-15

## 2022-03-19 PROBLEM — I49.3 PVC'S (PREMATURE VENTRICULAR CONTRACTIONS): Status: ACTIVE | Noted: 2019-03-15

## 2022-03-19 PROBLEM — N39.0 FREQUENT UTI: Status: ACTIVE | Noted: 2017-08-15

## 2022-03-19 PROBLEM — L02.11 ABSCESS OF SKIN OF NECK: Status: ACTIVE | Noted: 2019-06-25

## 2022-03-19 PROBLEM — I47.1 PSVT (PAROXYSMAL SUPRAVENTRICULAR TACHYCARDIA) (HCC): Status: ACTIVE | Noted: 2021-02-01

## 2022-03-19 PROBLEM — F41.9 ANXIETY: Status: ACTIVE | Noted: 2017-08-15

## 2022-03-19 PROBLEM — Z80.0 FAMILY HISTORY OF COLON CANCER: Status: ACTIVE | Noted: 2017-08-15

## 2022-03-19 PROBLEM — L65.9 LOSS OF HAIR: Status: ACTIVE | Noted: 2017-08-15

## 2022-03-19 PROBLEM — R14.0 ABDOMINAL BLOATING: Status: ACTIVE | Noted: 2017-08-15

## 2022-03-19 PROBLEM — H91.90 HOH (HARD OF HEARING): Status: ACTIVE | Noted: 2017-08-15

## 2022-03-20 PROBLEM — J33.9 NASAL POLYP: Status: ACTIVE | Noted: 2019-04-11

## 2022-03-22 ENCOUNTER — ANCILLARY PROCEDURE (OUTPATIENT)
Dept: CARDIOLOGY CLINIC | Age: 87
End: 2022-03-22
Payer: MEDICARE

## 2022-03-22 ENCOUNTER — TELEPHONE (OUTPATIENT)
Dept: CARDIOLOGY CLINIC | Age: 87
End: 2022-03-22

## 2022-03-22 VITALS
DIASTOLIC BLOOD PRESSURE: 64 MMHG | HEIGHT: 65 IN | SYSTOLIC BLOOD PRESSURE: 142 MMHG | BODY MASS INDEX: 19.33 KG/M2 | WEIGHT: 116 LBS

## 2022-03-22 DIAGNOSIS — I10 PRIMARY HYPERTENSION: ICD-10-CM

## 2022-03-22 DIAGNOSIS — I47.1 PSVT (PAROXYSMAL SUPRAVENTRICULAR TACHYCARDIA) (HCC): ICD-10-CM

## 2022-03-22 DIAGNOSIS — I25.10 ASHD (ARTERIOSCLEROTIC HEART DISEASE): ICD-10-CM

## 2022-03-22 DIAGNOSIS — M79.89 LEG SWELLING: ICD-10-CM

## 2022-03-22 LAB
LEFT CFV RFX: 0.6 S
LEFT GSV AT KNEE DIAM: 0.39 CM
LEFT GSV AT KNEE RFX: 859 S
LEFT GSV BK MID DIAM: 0.34 CM
LEFT GSV BK MID RFX: 632 S
LEFT GSV JUNC DIAM: 0.78 CM
LEFT GSV JUNC RFX: 0 S
LEFT GSV THIGH PROX DIAM: 0.62 CM
LEFT GSV THIGH PROX RFX: 0 S
LEFT SSV PROX DIAM: 0.26 CM
LEFT SSV PROX RFX: 0 S
Lab: 0 S
Lab: 0.33 CM
RIGHT GSV AK RFX: 504 S
RIGHT GSV AT KNEE DIAM: 0.42 CM
RIGHT GSV BK MID DIAM: 0.4 CM
RIGHT GSV BK MID RFX: 6180 S
RIGHT GSV JUNC DIAM: 0.78 CM
RIGHT GSV JUNC RFX: 0 S
RIGHT GSV THIGH PROX DIAM: 0.65 CM
RIGHT GSV THIGH PROX RFX: 0 S
RIGHT SSV PROX DIAM: 0.3 CM
RIGHT SSV PROX RFX: 4375 S
VAS LEFT AASV DIAM: 0.39 CM
VAS LEFT AASV RFX: 0 S

## 2022-03-22 PROCEDURE — 93306 TTE W/DOPPLER COMPLETE: CPT | Performed by: INTERNAL MEDICINE

## 2022-03-22 PROCEDURE — 93970 EXTREMITY STUDY: CPT | Performed by: INTERNAL MEDICINE

## 2022-03-22 NOTE — TELEPHONE ENCOUNTER
----- Message from Rebekah Shukla MD sent at 3/22/2022  2:39 PM EDT -----  Inform her no DVT on venous doppler. Use compression stockings for leg swelling. No other work up. Jillian Vera, daughter on [employer], advised no DVT on venous doppler, use compression stockings and elevate legs for swelling. No other work up is needed. Too Clark then advised that pt's legs around the ankles are red again, raised again, bumpy again. Not draining or oozing yet. That is the way it looked before when they started oozing last time. She does note the swelling in the ankles have gone. That part is good. They are putting neosporin on the ankles. She then advised not using the compressions at this time because her legs are not swelling. She then wanted to know if she could have pt try Lasix as needed since the swelling is gone. Advised I would send message to  and call tomorrow. Too Clark verbalized understanding.

## 2022-03-24 LAB
ECHO AO ROOT DIAM: 2.6 CM
ECHO AO ROOT INDEX: 1.66 CM/M2
ECHO AV AREA PEAK VELOCITY: 2.3 CM2
ECHO AV AREA/BSA PEAK VELOCITY: 1.5 CM2/M2
ECHO AV PEAK GRADIENT: 12 MMHG
ECHO AV PEAK VELOCITY: 1.7 M/S
ECHO EST RA PRESSURE: 8 MMHG
ECHO LA DIAMETER INDEX: 2.1 CM/M2
ECHO LA DIAMETER: 3.3 CM
ECHO LA TO AORTIC ROOT RATIO: 1.27
ECHO LA VOL 2C: 64 ML (ref 22–52)
ECHO LA VOL 4C: 61 ML (ref 22–52)
ECHO LA VOL BP: 65 ML (ref 22–52)
ECHO LA VOL/BSA BIPLANE: 41 ML/M2 (ref 16–34)
ECHO LA VOLUME AREA LENGTH: 69 ML
ECHO LA VOLUME INDEX A2C: 41 ML/M2 (ref 16–34)
ECHO LA VOLUME INDEX A4C: 39 ML/M2 (ref 16–34)
ECHO LA VOLUME INDEX AREA LENGTH: 44 ML/M2 (ref 16–34)
ECHO LV E' LATERAL VELOCITY: 9 CM/S
ECHO LV E' SEPTAL VELOCITY: 7 CM/S
ECHO LV FRACTIONAL SHORTENING: 32 % (ref 28–44)
ECHO LV INTERNAL DIMENSION DIASTOLE INDEX: 2.99 CM/M2
ECHO LV INTERNAL DIMENSION DIASTOLIC: 4.7 CM (ref 3.9–5.3)
ECHO LV INTERNAL DIMENSION SYSTOLIC INDEX: 2.04 CM/M2
ECHO LV INTERNAL DIMENSION SYSTOLIC: 3.2 CM
ECHO LV IVSD: 1 CM (ref 0.6–0.9)
ECHO LV MASS 2D: 164.5 G (ref 67–162)
ECHO LV MASS INDEX 2D: 104.7 G/M2 (ref 43–95)
ECHO LV POSTERIOR WALL DIASTOLIC: 1 CM (ref 0.6–0.9)
ECHO LV RELATIVE WALL THICKNESS RATIO: 0.43
ECHO LVOT AREA: 3.1 CM2
ECHO LVOT DIAM: 2 CM
ECHO LVOT MEAN GRADIENT: 4 MMHG
ECHO LVOT PEAK GRADIENT: 7 MMHG
ECHO LVOT PEAK GRADIENT: 7 MMHG
ECHO LVOT PEAK VELOCITY: 1.3 M/S
ECHO LVOT PEAK VELOCITY: 1.3 M/S
ECHO LVOT STROKE VOLUME INDEX: 60 ML/M2
ECHO LVOT SV: 94.2 ML
ECHO LVOT VTI: 30 CM
ECHO MV A VELOCITY: 1.53 M/S
ECHO MV AREA VTI: 1.9 CM2
ECHO MV E DECELERATION TIME (DT): 204.9 MS
ECHO MV E VELOCITY: 1.05 M/S
ECHO MV E/A RATIO: 0.69
ECHO MV E/E' LATERAL: 11.67
ECHO MV E/E' RATIO (AVERAGED): 13.33
ECHO MV E/E' SEPTAL: 15
ECHO MV LVOT VTI INDEX: 1.64
ECHO MV MAX VELOCITY: 1.8 M/S
ECHO MV MEAN GRADIENT: 4 MMHG
ECHO MV MEAN VELOCITY: 0.9 M/S
ECHO MV PEAK GRADIENT: 13 MMHG
ECHO MV REGURGITANT PEAK GRADIENT: 112 MMHG
ECHO MV REGURGITANT PEAK VELOCITY: 5.3 M/S
ECHO MV VTI: 49.3 CM
ECHO RIGHT VENTRICULAR SYSTOLIC PRESSURE (RVSP): 35 MMHG
ECHO RV TAPSE: 2.3 CM (ref 1.5–2)
ECHO TV REGURGITANT MAX VELOCITY: 2.6 M/S
ECHO TV REGURGITANT PEAK GRADIENT: 27 MMHG

## 2022-03-24 PROCEDURE — 93306 TTE W/DOPPLER COMPLETE: CPT | Performed by: INTERNAL MEDICINE

## 2022-03-24 NOTE — TELEPHONE ENCOUNTER
Message  Received: Today  Geraldo Marino MD sent to Daniela Jeffers LPN  Caller: Unspecified (2 days ago,  4:50 PM)  Just monitor. No further vascular intervention. Use lasix as she has been. Called pt's daughter, Svitlana Dinh on [place], advised  wants her to continue to monitor pt's legs/ankles. Advised she can continue the neosporin as she is using. Advised  wants pt to continue the lasix and potassium as she is taking. Advised if legs get any worse or different to call us back. Svitlana Dinh verbalized understanding.

## 2022-03-25 ENCOUNTER — TELEPHONE (OUTPATIENT)
Dept: CARDIOLOGY CLINIC | Age: 87
End: 2022-03-25

## 2022-03-25 NOTE — TELEPHONE ENCOUNTER
Spoke with patients daughter. Verified with two patient identifiers. Advised pt per Dr. Licha Eric her Echo was ok. Patients daughter verbalized understanding.

## 2022-04-08 ENCOUNTER — TRANSCRIBE ORDER (OUTPATIENT)
Dept: SCHEDULING | Age: 87
End: 2022-04-08

## 2022-04-08 DIAGNOSIS — E83.52 HYPERCALCEMIA: ICD-10-CM

## 2022-04-08 DIAGNOSIS — D47.2 MONOCLONAL PARAPROTEINEMIA: Primary | ICD-10-CM

## 2022-04-12 ENCOUNTER — TRANSCRIBE ORDER (OUTPATIENT)
Dept: SCHEDULING | Age: 87
End: 2022-04-12

## 2022-04-12 DIAGNOSIS — D47.2 MONOCLONAL GAMMOPATHY: Primary | ICD-10-CM

## 2022-04-12 DIAGNOSIS — R52 PAIN, UNSPECIFIED: ICD-10-CM

## 2022-04-12 DIAGNOSIS — E83.52 HYPERCALCEMIA: ICD-10-CM

## 2022-04-20 ENCOUNTER — TRANSCRIBE ORDER (OUTPATIENT)
Dept: SCHEDULING | Age: 87
End: 2022-04-20

## 2022-04-20 DIAGNOSIS — D47.2 MONOCLONAL GAMMOPATHY: Primary | ICD-10-CM

## 2022-04-20 DIAGNOSIS — E83.52 HYPERCALCEMIA: ICD-10-CM

## 2022-04-20 DIAGNOSIS — R52 PAIN, UNSPECIFIED: ICD-10-CM

## 2022-04-21 ENCOUNTER — TRANSCRIBE ORDER (OUTPATIENT)
Dept: SCHEDULING | Age: 87
End: 2022-04-21

## 2022-04-21 DIAGNOSIS — R52 PAIN: ICD-10-CM

## 2022-04-21 DIAGNOSIS — D47.2 MONOCLONAL PARAPROTEINEMIA: Primary | ICD-10-CM

## 2022-04-21 DIAGNOSIS — C88.0 WALDENSTROM MACROGLOBULINEMIA (HCC): ICD-10-CM

## 2022-04-21 DIAGNOSIS — E83.52 HYPERCALCEMIA: ICD-10-CM

## 2022-05-06 ENCOUNTER — HOSPITAL ENCOUNTER (OUTPATIENT)
Dept: CT IMAGING | Age: 87
Discharge: HOME | End: 2022-05-06
Attending: INTERNAL MEDICINE
Payer: MEDICARE

## 2022-05-06 DIAGNOSIS — D47.2 MONOCLONAL PARAPROTEINEMIA: ICD-10-CM

## 2022-05-06 DIAGNOSIS — C88.0 WALDENSTROM MACROGLOBULINEMIA (HCC): ICD-10-CM

## 2022-05-06 DIAGNOSIS — E83.52 HYPERCALCEMIA: ICD-10-CM

## 2022-05-06 DIAGNOSIS — R52 PAIN: ICD-10-CM

## 2022-05-06 PROCEDURE — 74011000250 HC RX REV CODE- 250: Performed by: INTERNAL MEDICINE

## 2022-05-06 PROCEDURE — 71260 CT THORAX DX C+: CPT

## 2022-05-06 PROCEDURE — 74011000636 HC RX REV CODE- 636: Performed by: INTERNAL MEDICINE

## 2022-05-06 PROCEDURE — 74177 CT ABD & PELVIS W/CONTRAST: CPT

## 2022-05-06 RX ORDER — BARIUM SULFATE 20 MG/ML
900 SUSPENSION ORAL
Status: COMPLETED | OUTPATIENT
Start: 2022-05-06 | End: 2022-05-06

## 2022-05-06 RX ADMIN — IOPAMIDOL 100 ML: 755 INJECTION, SOLUTION INTRAVENOUS at 10:19

## 2022-05-06 RX ADMIN — BARIUM SULFATE 900 ML: 21 SUSPENSION ORAL at 10:19

## 2023-03-23 ENCOUNTER — HOSPITAL ENCOUNTER (OUTPATIENT)
Dept: GENERAL RADIOLOGY | Age: 88
Discharge: HOME | End: 2023-03-23
Payer: MEDICARE

## 2023-03-23 ENCOUNTER — TRANSCRIBE ORDER (OUTPATIENT)
Dept: REGISTRATION | Age: 88
End: 2023-03-23

## 2023-03-23 DIAGNOSIS — C88.0 WALDENSTROM MACROGLOBULINEMIA (HCC): ICD-10-CM

## 2023-03-23 DIAGNOSIS — R52 PAIN, UNSPECIFIED: ICD-10-CM

## 2023-03-23 DIAGNOSIS — E83.52 HYPERCALCEMIA: ICD-10-CM

## 2023-03-23 DIAGNOSIS — D47.2 MONOCLONAL GAMMOPATHY: Primary | ICD-10-CM

## 2023-03-23 DIAGNOSIS — D47.2 MONOCLONAL GAMMOPATHY: ICD-10-CM

## 2023-03-23 PROCEDURE — 73590 X-RAY EXAM OF LOWER LEG: CPT

## 2023-11-10 NOTE — PROGRESS NOTES
Reviewed record in preparation for visit and have obtained necessary documentation. Identified pt with two pt identifiers(name and ).       Chief Complaint   Patient presents with    Follow-up     3 mo        Coordination of Care Questionnaire:  :     1) Have you been to an emergency room, urgent care clinic since your last visit? no   Hospitalized since your last visit? no             2) Have you seen or consulted any other health care providers outside of [address] [place] [address] since your last visit? no  (Include any pap smears or colon screenings in this section.) Scheduled for a right ankle ultrasound guided corticosteroid injection botox injections, right peroneal muscles and possible short leg casting on 11/15/23 with Dr. Schumacher at [facility] [place].

## (undated) DEVICE — DEVON™ KNEE AND BODY STRAP 60" X 3" (1.5 M X 7.6 CM): Brand: DEVON

## (undated) DEVICE — SUT SLK 2-0SH 30IN BLK --

## (undated) DEVICE — CLIP INT SM WIDE RED TI TRNSVRS GRV CHEVRON SHP W PRECIS

## (undated) DEVICE — KENDALL SCD EXPRESS SLEEVES, KNEE LENGTH, MEDIUM: Brand: KENDALL SCD

## (undated) DEVICE — SUTURE PERMAHAND SZ 2-0 L30IN NONABSORBABLE BLK SILK W/O A305H

## (undated) DEVICE — MAGNETIC DRAPE: Brand: DEVON

## (undated) DEVICE — ZINACTIVE USE 2641837 CLIP LIG M BLU TI HRT SHP WIRE HORZ 600 PER BX

## (undated) DEVICE — 1200 GUARD II KIT W/5MM TUBE W/O VAC TUBE: Brand: GUARDIAN

## (undated) DEVICE — STERILE POLYISOPRENE POWDER-FREE SURGICAL GLOVES: Brand: PROTEXIS

## (undated) DEVICE — HANDLE LT SNAP ON ULT DURABLE LENS FOR TRUMPF ALC DISPOSABLE

## (undated) DEVICE — REM POLYHESIVE ADULT PATIENT RETURN ELECTRODE: Brand: VALLEYLAB

## (undated) DEVICE — SUTURE VCRL SZ 4-0 L18IN ABSRB UD L13MM P-3 3/8 CIR PRIM J494H

## (undated) DEVICE — INSULATED BLADE ELECTRODE: Brand: EDGE

## (undated) DEVICE — TOWEL SURG W17XL27IN STD BLU COT NONFENESTRATED PREWASHED

## (undated) DEVICE — SOLUTION IV 1000ML 0.9% SOD CHL

## (undated) DEVICE — SURGICAL PROCEDURE PACK BASIN MAJ SET CUST NO CAUT

## (undated) DEVICE — ROCKER SWITCH PENCIL BLADE ELECTRODE, HOLSTER: Brand: EDGE

## (undated) DEVICE — SHEET,T,THYROID,STERILE: Brand: MEDLINE

## (undated) DEVICE — SUTURE PERMAHAND SZ 3-0 L30IN NONABSORBABLE BLK SILK BRAID A304H

## (undated) DEVICE — SPONGE HEMOSTAT CELLULS 4X8IN -- SURGICEL

## (undated) DEVICE — KIT INFECTION CTRL ST FRAN --

## (undated) DEVICE — DERMABOND SKIN ADH 0.7ML -- DERMABOND ADVANCED 12/BX

## (undated) DEVICE — SUTURE VCRL SZ 3-0 L27IN ABSRB UD L26MM SH 1/2 CIR J416H

## (undated) DEVICE — GOWN,PREVENTION PLUS,XL,ST,24/CS: Brand: MEDLINE

## (undated) DEVICE — PREP CHLORAPREP 10.5 ML ORG --

## (undated) DEVICE — SUTURE VCRL SZ 2-0 L27IN ABSRB VLT L26MM SH 1/2 CIR J317H

## (undated) DEVICE — BASIC PACK: Brand: CONVERTORS